# Patient Record
Sex: MALE | Race: WHITE | NOT HISPANIC OR LATINO | Employment: OTHER | URBAN - METROPOLITAN AREA
[De-identification: names, ages, dates, MRNs, and addresses within clinical notes are randomized per-mention and may not be internally consistent; named-entity substitution may affect disease eponyms.]

---

## 2018-06-11 ENCOUNTER — OFFICE VISIT CONVERTED (OUTPATIENT)
Dept: SURGERY | Facility: CLINIC | Age: 73
End: 2018-06-11
Attending: SURGERY

## 2018-06-11 ENCOUNTER — CONVERSION ENCOUNTER (OUTPATIENT)
Dept: SURGERY | Facility: CLINIC | Age: 73
End: 2018-06-11

## 2018-06-29 ENCOUNTER — OFFICE VISIT CONVERTED (OUTPATIENT)
Dept: SURGERY | Facility: CLINIC | Age: 73
End: 2018-06-29
Attending: SURGERY

## 2018-08-10 ENCOUNTER — OFFICE VISIT CONVERTED (OUTPATIENT)
Dept: SURGERY | Facility: CLINIC | Age: 73
End: 2018-08-10
Attending: SURGERY

## 2018-11-01 ENCOUNTER — OFFICE VISIT CONVERTED (OUTPATIENT)
Dept: OTOLARYNGOLOGY | Facility: CLINIC | Age: 73
End: 2018-11-01
Attending: OTOLARYNGOLOGY

## 2019-04-18 ENCOUNTER — HOSPITAL ENCOUNTER (OUTPATIENT)
Dept: NUCLEAR MEDICINE | Facility: HOSPITAL | Age: 74
Discharge: HOME | End: 2019-04-18
Attending: SPECIALIST

## 2019-11-26 ENCOUNTER — HOSPITAL ENCOUNTER (OUTPATIENT)
Dept: SURGERY | Facility: HOSPITAL | Age: 74
Setting detail: HOSPITAL OUTPATIENT SURGERY
Discharge: HOME | End: 2019-11-26
Attending: OPHTHALMOLOGY

## 2019-12-11 ENCOUNTER — HOSPITAL ENCOUNTER (OUTPATIENT)
Dept: SURGERY | Facility: HOSPITAL | Age: 74
Setting detail: HOSPITAL OUTPATIENT SURGERY
Discharge: HOME | End: 2019-12-11
Attending: OPHTHALMOLOGY

## 2019-12-16 ENCOUNTER — HOSPITAL ENCOUNTER (OUTPATIENT)
Dept: OTHER | Facility: HOSPITAL | Age: 74
Discharge: HOME | End: 2019-12-16
Attending: INTERNAL MEDICINE

## 2019-12-16 LAB
25(OH)D3 SERPL-MCNC: 36.3 NG/ML (ref 30–100)
ALBUMIN SERPL-MCNC: 4 G/DL (ref 3.5–5)
ALBUMIN/GLOB SERPL: 1.5 {RATIO} (ref 1.4–2.6)
ALP SERPL-CCNC: 78 U/L (ref 56–155)
ALT SERPL-CCNC: 29 U/L (ref 10–40)
ANION GAP SERPL CALC-SCNC: 16 MMOL/L (ref 8–19)
AST SERPL-CCNC: 23 U/L (ref 15–50)
BASOPHILS # BLD AUTO: 0.05 10*3/UL (ref 0–0.2)
BASOPHILS NFR BLD AUTO: 0.7 % (ref 0–3)
BILIRUB SERPL-MCNC: 0.37 MG/DL (ref 0.2–1.3)
BUN SERPL-MCNC: 18 MG/DL (ref 5–25)
BUN/CREAT SERPL: 20 {RATIO} (ref 6–20)
CALCIUM SERPL-MCNC: 10 MG/DL (ref 8.7–10.4)
CHLORIDE SERPL-SCNC: 102 MMOL/L (ref 99–111)
CONV ABS IMM GRAN: 0.07 10*3/UL (ref 0–0.2)
CONV CO2: 28 MMOL/L (ref 22–32)
CONV CREATININE URINE, RANDOM: 101.5 MG/DL (ref 10–300)
CONV IMMATURE GRAN: 1 % (ref 0–1.8)
CONV MICROALBUM.,U,RANDOM: <12 MG/L (ref 0–20)
CONV TOTAL PROTEIN: 6.6 G/DL (ref 6.3–8.2)
CREAT UR-MCNC: 0.88 MG/DL (ref 0.7–1.2)
DEPRECATED RDW RBC AUTO: 44.8 FL (ref 35.1–43.9)
EOSINOPHIL # BLD AUTO: 0.1 10*3/UL (ref 0–0.7)
EOSINOPHIL # BLD AUTO: 1.5 % (ref 0–7)
ERYTHROCYTE [DISTWIDTH] IN BLOOD BY AUTOMATED COUNT: 13.1 % (ref 11.6–14.4)
EST. AVERAGE GLUCOSE BLD GHB EST-MCNC: 140 MG/DL
GFR SERPLBLD BASED ON 1.73 SQ M-ARVRAT: >60 ML/MIN/{1.73_M2}
GLOBULIN UR ELPH-MCNC: 2.6 G/DL (ref 2–3.5)
GLUCOSE SERPL-MCNC: 155 MG/DL (ref 70–99)
HBA1C MFR BLD: 6.5 % (ref 3.5–5.7)
HCT VFR BLD AUTO: 41.5 % (ref 42–52)
HGB BLD-MCNC: 13.9 G/DL (ref 14–18)
LYMPHOCYTES # BLD AUTO: 1.64 10*3/UL (ref 1–5)
LYMPHOCYTES NFR BLD AUTO: 24.4 % (ref 20–45)
MCH RBC QN AUTO: 31.3 PG (ref 27–31)
MCHC RBC AUTO-ENTMCNC: 33.5 G/DL (ref 33–37)
MCV RBC AUTO: 93.5 FL (ref 80–96)
MICROALBUMIN/CREAT UR: 11.8 MG/G{CRE} (ref 0–25)
MONOCYTES # BLD AUTO: 0.44 10*3/UL (ref 0.2–1.2)
MONOCYTES NFR BLD AUTO: 6.5 % (ref 3–10)
NEUTROPHILS # BLD AUTO: 4.42 10*3/UL (ref 2–8)
NEUTROPHILS NFR BLD AUTO: 65.9 % (ref 30–85)
NRBC CBCN: 0 % (ref 0–0.7)
OSMOLALITY SERPL CALC.SUM OF ELEC: 299 MOSM/KG (ref 273–304)
PLATELET # BLD AUTO: 257 10*3/UL (ref 130–400)
PMV BLD AUTO: 11.2 FL (ref 9.4–12.4)
POTASSIUM SERPL-SCNC: 3.9 MMOL/L (ref 3.5–5.3)
RBC # BLD AUTO: 4.44 10*6/UL (ref 4.7–6.1)
SODIUM SERPL-SCNC: 142 MMOL/L (ref 135–147)
TSH SERPL-ACNC: 1.25 M[IU]/L (ref 0.27–4.2)
WBC # BLD AUTO: 6.72 10*3/UL (ref 4.8–10.8)

## 2019-12-17 ENCOUNTER — HOSPITAL ENCOUNTER (OUTPATIENT)
Dept: OTHER | Facility: HOSPITAL | Age: 74
Discharge: HOME | End: 2019-12-17
Attending: INTERNAL MEDICINE

## 2019-12-17 LAB
APTT BLD: 22.2 S (ref 22.2–34.2)
CHOLEST SERPL-MCNC: 104 MG/DL (ref 107–200)
CHOLEST/HDLC SERPL: 3 {RATIO} (ref 3–6)
HDLC SERPL-MCNC: 35 MG/DL (ref 40–60)
INR PPP: 0.98 (ref 2–3)
LDLC SERPL CALC-MCNC: 56 MG/DL (ref 70–100)
PROTHROMBIN TIME: 10.6 S (ref 9.4–12)
TRIGL SERPL-MCNC: 66 MG/DL (ref 40–150)
VLDLC SERPL-MCNC: 13 MG/DL (ref 5–37)

## 2020-06-05 ENCOUNTER — OFFICE VISIT CONVERTED (OUTPATIENT)
Dept: UROLOGY | Facility: CLINIC | Age: 75
End: 2020-06-05
Attending: UROLOGY

## 2020-07-09 ENCOUNTER — HOSPITAL ENCOUNTER (OUTPATIENT)
Dept: OTHER | Facility: HOSPITAL | Age: 75
Discharge: HOME | End: 2020-07-09
Attending: OPHTHALMOLOGY

## 2020-07-09 LAB
ANION GAP SERPL CALC-SCNC: 13 MMOL/L (ref 8–19)
BASOPHILS # BLD AUTO: 0.05 10*3/UL (ref 0–0.2)
BASOPHILS NFR BLD AUTO: 0.6 % (ref 0–3)
BUN SERPL-MCNC: 20 MG/DL (ref 5–25)
BUN/CREAT SERPL: 20 {RATIO} (ref 6–20)
CALCIUM SERPL-MCNC: 10.3 MG/DL (ref 8.7–10.4)
CHLORIDE SERPL-SCNC: 100 MMOL/L (ref 99–111)
CONV ABS IMM GRAN: 0.05 10*3/UL (ref 0–0.2)
CONV CO2: 25 MMOL/L (ref 22–32)
CONV IMMATURE GRAN: 0.6 % (ref 0–1.8)
CREAT UR-MCNC: 0.99 MG/DL (ref 0.7–1.2)
DEPRECATED RDW RBC AUTO: 43.9 FL (ref 35.1–43.9)
EOSINOPHIL # BLD AUTO: 0.16 10*3/UL (ref 0–0.7)
EOSINOPHIL # BLD AUTO: 1.8 % (ref 0–7)
ERYTHROCYTE [DISTWIDTH] IN BLOOD BY AUTOMATED COUNT: 12.6 % (ref 11.6–14.4)
GFR SERPLBLD BASED ON 1.73 SQ M-ARVRAT: >60 ML/MIN/{1.73_M2}
GLUCOSE SERPL-MCNC: 111 MG/DL (ref 70–99)
HCT VFR BLD AUTO: 43.6 % (ref 42–52)
HGB BLD-MCNC: 14.6 G/DL (ref 14–18)
LYMPHOCYTES # BLD AUTO: 2.49 10*3/UL (ref 1–5)
LYMPHOCYTES NFR BLD AUTO: 27.6 % (ref 20–45)
MCH RBC QN AUTO: 31.7 PG (ref 27–31)
MCHC RBC AUTO-ENTMCNC: 33.5 G/DL (ref 33–37)
MCV RBC AUTO: 94.8 FL (ref 80–96)
MONOCYTES # BLD AUTO: 0.8 10*3/UL (ref 0.2–1.2)
MONOCYTES NFR BLD AUTO: 8.9 % (ref 3–10)
NEUTROPHILS # BLD AUTO: 5.46 10*3/UL (ref 2–8)
NEUTROPHILS NFR BLD AUTO: 60.5 % (ref 30–85)
NRBC CBCN: 0 % (ref 0–0.7)
OSMOLALITY SERPL CALC.SUM OF ELEC: 281 MOSM/KG (ref 273–304)
PLATELET # BLD AUTO: 256 10*3/UL (ref 130–400)
PMV BLD AUTO: 10.9 FL (ref 9.4–12.4)
POTASSIUM SERPL-SCNC: 4 MMOL/L (ref 3.5–5.3)
RBC # BLD AUTO: 4.6 10*6/UL (ref 4.7–6.1)
SODIUM SERPL-SCNC: 134 MMOL/L (ref 135–147)
WBC # BLD AUTO: 9.01 10*3/UL (ref 4.8–10.8)

## 2020-07-10 ENCOUNTER — OFFICE VISIT CONVERTED (OUTPATIENT)
Dept: SURGERY | Facility: CLINIC | Age: 75
End: 2020-07-10
Attending: SURGERY

## 2020-07-10 LAB — SARS-COV-2 RNA SPEC QL NAA+PROBE: NOT DETECTED

## 2020-07-13 ENCOUNTER — HOSPITAL ENCOUNTER (OUTPATIENT)
Dept: PERIOP | Facility: HOSPITAL | Age: 75
Setting detail: HOSPITAL OUTPATIENT SURGERY
Discharge: HOME | End: 2020-07-13
Attending: OPHTHALMOLOGY

## 2020-07-23 ENCOUNTER — OFFICE VISIT CONVERTED (OUTPATIENT)
Dept: ORTHOPEDIC SURGERY | Facility: CLINIC | Age: 75
End: 2020-07-23
Attending: ORTHOPAEDIC SURGERY

## 2020-09-07 ENCOUNTER — HOSPITAL ENCOUNTER (OUTPATIENT)
Dept: PREADMISSION TESTING | Facility: HOSPITAL | Age: 75
Discharge: HOME | End: 2020-09-07
Attending: SURGERY

## 2020-09-08 LAB — SARS-COV-2 RNA SPEC QL NAA+PROBE: NOT DETECTED

## 2020-09-09 ENCOUNTER — HOSPITAL ENCOUNTER (OUTPATIENT)
Dept: GASTROENTEROLOGY | Facility: HOSPITAL | Age: 75
Setting detail: HOSPITAL OUTPATIENT SURGERY
Discharge: HOME | End: 2020-09-09
Attending: SURGERY

## 2021-03-05 ENCOUNTER — OFFICE VISIT CONVERTED (OUTPATIENT)
Dept: OTOLARYNGOLOGY | Facility: CLINIC | Age: 76
End: 2021-03-05
Attending: OTOLARYNGOLOGY

## 2021-03-05 ENCOUNTER — CONVERSION ENCOUNTER (OUTPATIENT)
Dept: OTOLARYNGOLOGY | Facility: CLINIC | Age: 76
End: 2021-03-05

## 2021-05-07 ENCOUNTER — HOSPITAL ENCOUNTER (OUTPATIENT)
Dept: GENERAL RADIOLOGY | Facility: HOSPITAL | Age: 76
Discharge: HOME | End: 2021-05-07
Attending: PSYCHIATRY & NEUROLOGY

## 2021-05-10 LAB — CONV CALCIUM IONIZED: 6.1 MG/DL (ref 4.5–5.6)

## 2021-05-10 NOTE — H&P
"   History and Physical      Patient Name: Jaxon Aggarwal   Patient ID: [id number]   Sex: Male   YOB: 1945    Primary Care Provider: Max Castillo MD   Referring Provider: Max Castillo MD    Visit Date: July 23, 2020    Provider: Cruzito Adan MD   Location: [facility]   Location Address: [address] [place] [address]  [id number]   Location Phone: [phone]          Chief Complaint  · bilateral hip pain      History Of Present Illness  Jaxon Aggarwal is a 74 year old /White male who presents today to [facility].      left.  Patient has been having hip pain for a few years and has worsened over the last year, increasing pain with activity. The more pain he has he localizes laterally in his hip.  He denies groin pain. No radicular pain.   \">The patient presents today for evaluation of bilateral hips, right > left.  Patient has been having hip pain for a few years and has worsened over the last year, increasing pain with activity. The more pain he has he localizes laterally in his hip.  He denies groin pain. No radicular pain.          Past Medical History  Allergic rhinitis, chronic; Arthritis; Arthritis; BPH; GERD; High blood pressure; Hypertension; Hypogonadism; Mood disorder; Prostate Disorder; Seasonal allergies; Vertigo         Past Surgical History  Cataract surgery; Cystoscopy; Eye Implant; Hernia; Tonsillectomy         Medication List  finasteride 5 mg oral tablet; Flomax 0.4 mg oral capsule; lisinopril-hydrochlorothiazide 20-25 mg oral tablet; meclizine 25 mg oral tablet; Prozac 40 mg oral capsule; triamcinolone acetonide 0.1 % topical cream; Voltaren 1 % topical gel         Allergy List  NO KNOWN DRUG ALLERGIES       Allergies Reconciled  Family Medical History  Stroke; Cancer, Unspecified; Bladder calculus; Diabetes; Osteoporosis         Social History  Alcohol (Current some day); Alcohol Use (Current some day); Caffeine (Current every day); lives with spouse; " ".; Recreational Drug Use (Never); Retired.; Second hand smoke exposure (Never); Tobacco (Former)         Review of Systems  · Constitutional  o Denies  o : fever, chills, weight loss  · Cardiovascular  o Denies  o : chest pain, shortness of breath  · Gastrointestinal  o Denies  o : liver disease, heartburn, nausea, blood in stools  · Genitourinary  o Denies  o : painful urination, blood in urine  · Integument  o Denies  o : rash, itching  · Neurologic  o Denies  o : headache, weakness, loss of consciousness  · Musculoskeletal  o Denies  o : painful, swollen joints  · Psychiatric  o Denies  o : drug/alcohol addiction, anxiety, depression      Vitals  Date Time BP Position Site L\R Cuff Size HR RR TEMP (F) WT  HT  BMI kg/m2 BSA m2 O2 Sat        07/23/2020 01:23 PM      94 - R   165lbs 0oz 5'  7\" 25.84 1.88 97 %          Physical Examination  · Constitutional  o Appearance  o : well developed, well-nourished, no obvious deformities present  · Head and Face  o Head  o :   § Inspection  § : normocephalic  o Face  o :   § Inspection  § : no facial lesions  · Eyes  o Conjunctivae  o : conjunctivae normal  o Sclerae  o : sclerae white  · Ears, Nose, Mouth and Throat  o Ears  o :   § External Ears  § : appearance within normal limits  § Hearing  § : intact  o Nose  o :   § External Nose  § : appearance normal  · Neck  o Inspection/Palpation  o : normal appearance  o Range of Motion  o : full range of motion  · Respiratory  o Respiratory Effort  o : breathing unlabored  o Inspection of Chest  o : normal appearance  o Auscultation of Lungs  o : no audible wheezing or rales  · Cardiovascular  o Heart  o : regular rate  · Gastrointestinal  o Abdominal Examination  o : soft and non-tender  · Skin and Subcutaneous Tissue  o General Inspection  o : intact, no rashes  · Psychiatric  o General  o : Alert and oriented x3  o Judgement and Insight  o : judgment and insight intact  o Mood and Affect  o : mood normal, affect " appropriate  · Extremities  o Extremities  o : sensation intact, pulses normal, tender to palpation, pain with movement, tender to palpation over trochanteric bursa  · Injection Note/Aspiration Note  o Site  o : IM  o Procedure  o : Procedure: After educating the patient, patient gave consent for the procedure. After using alcohol prep, injection was given. The patient tolerated the procedure well.   o Medication  o : 2ml's of 4 mg Dexamethasone   · In Office Procedures  o View  o : AP/LATERAL  o Site  o : right, hip   o Indication  o : Right hip pain   o Study  o : X-rays ordered, taken in the office, and reviewed today.  o Xray  o : negative fracture or dislocation   o Comparative Data  o : No comparative data found          Assessment  · Trochanteric Bursitis, right > left     726.5/M70.60  · Right Pain: Hip     719.45/M25.559      Plan  · Orders  o 2.00 - Dexamethasone Injection 8mg (-1) - 719.45/M25.559 - 07/23/2020   Lot [id number] manufactured by [employer] 06 2021  o IM/SQ - Injection Fee [facility] (36703) - 719.45/M25.559 - 07/23/2020   Administered by SHANTEL Polo  o Hip (Right) 2 or more views (includes AP Pelvis) [facility] Preferred View. 29742) - 719.45/M25.559 - 07/23/2020  · Medications  o Medications have been Reconciled  o Transition of Care or Provider Policy  · Instructions  o Reviewed the patient's Past Medical, Social, and Family history as well as the ROS at today's visit, no changes.  o Call or return if worsening symptoms.  o Exercise handout given.  o X-ray ordered, taken and reviewed at this visit.  o Follow up as needed.  o This note was transcribed by Leticia Light.   o Plan for injection, Voltaren gel and stretches.   o Electronically Identified Patient Education Materials Provided Electronically            Electronically Signed by: Leticia Light-, Other -Author on July 24, 2020 11:07:18 PM  Electronically Co-signed by: Cruzito Adan MD -Reviewer on July 31, 2020 08:54:34 AM

## 2021-05-10 NOTE — H&P
History and Physical      Patient Name: Jaxon Aggarwal   Patient ID: [id number]   Sex: Male   YOB: 1945    Primary Care Provider: Max Castillo MD   Referring Provider: Max Castillo MD    Visit Date: June 5, 2020    Provider: Donald Martinez MD   Location[facility]   Location Address: [address] [place] [address]  [id number]   Location Phone: [phone]          Chief Complaint  · pt here for urologic issues      History Of Present Illness       74-year-old gentleman with BPH and family history of prostate cancer    slow stream.  Some trouble with initiation of stream. Nocturia X 2-3   .  No urgency or frequency.  No incontinence.  Flomax 0.4 mg. Not sure if this is helping    PVR today 007    no gross hematuria, dysuria or recurrent urinary tract infections.  No history of kidney stone.    Prostate cancer diagnosed around 61.    Has never had any urologic surgery.    No cardiopulmonary history.  Patient does not smoke.  Patient does not use blood thinner.  Men in his family do not live to be old, mom lived to .    PSA, being done at his primary care physician Dr. Castillo    4/19 1.0       Past Medical History  Allergic rhinitis, chronic; Arthritis; Arthritis; BPH; GERD; High blood pressure; Hypogonadism; Mood disorder; Seasonal allergies; Vertigo         Past Surgical History  Cataract surgery; Cystoscopy; Hernia; Tonsillectomy         Medication List  Flomax 0.4 mg oral capsule,extended release 24hr; lisinopril-hydrochlorothiazide 20-25 mg oral tablet; Prozac 40 mg oral capsule; triamcinolone acetonide 0.1 % topical cream         Allergy List  NO KNOWN DRUG ALLERGIES         Family Medical History  Stroke; Cancer, Unspecified; Bladder calculus; Diabetes         Social History  Alcohol (Current some day); Caffeine (Current every day); Second hand smoke exposure (Never); Tobacco (Former)         Review of Systems  · Constitutional  o Denies  o :  "chills  · Gastrointestinal  o Denies  o : nausea      Vitals  Date Time BP Position Site L\R Cuff Size HR RR TEMP (F) WT  HT  BMI kg/m2 BSA m2 O2 Sat        06/05/2020 01:49 PM       15  170lbs 0oz 5'  7\" 26.63 1.91           Physical Examination  · Constitutional  o Appearance  o : Well-appearing, well-developed, in no acute distress  · Respiratory  o Respiratory Effort  o : Unlabored breathing  o Auscultation of Lungs  o : Unlabored breathing, clear to auscultation bilaterally  · Cardiovascular  o Heart  o :   § Auscultation of Heart  § : Regular rate and rhythm, no murmurs  · Gastrointestinal  o Abdominal Examination  o : Nontender, nondistended, no rigidity or guarding, no hepatosplenomegaly  · Genitourinary  o Bladder  o : nonpalpable  o Penis  o : circumcised phallus with no masses or lesions  o Urethral Meatus  o : no inflammation present and no stenosis  o Scrotum and Scrotal Contents  o :   § Testes  § : Bilateral descended testicles no masses or lesions  o Digital Rectal Examination  o :   § Tone and Masses  § : Normal sphincter tone, no rectal masses present  § Prostate  § : prostate nontender with no nodules and normal consistency, 45 g  · Neurologic  o Mental Status Examination  o :   § Orientation  § : Grossly oriented to person, place and time, judgment and insight intact, normal mood and affect          Results  · In-Office Procedures  o Lab procedure  § Automated Dipstick Urinalysis (Surg Spec) WITHOUT Micro [facility] (74263)   § Color Ur: Yellow   § Clarity Ur: Clear   § Glucose Ur Ql Strip: Negative   § Bilirub Ur Ql Strip: Negative   § Ketones Ur Ql Strip: Trace   § Sp Gr Ur Qn: 1.025   § Hgb Ur Ql Strip: Negative   § pH Ur-LsCnc: 5.5   § Prot Ur Ql Strip: Negative   § Urobilinogen Ur Strip-mCnc: 0.2 E.U./dL   § Nitrite Ur Ql Strip: Negative   § WBC Est Ur Ql Strip: Negative   o Surgical procedure  § IOP - Bladder Scan/Residual Urine (07335)   § Specimen vol Ur: 7       Assessment  · BPH (benign " prostatic hyperplasia)     600.00/N40.0  · Family history of prostate cancer     V16.42/Z80.42    Problems Reconciled  Plan  · Medications  o Medications have been Reconciled  o Transition of Care or Provider Policy  · Instructions  o Electronically Identified Patient Education Materials Provided Electronically       PSA Records were acquired from his PCP    Patient bothered by a slow stream.  After discussion he will stay on Flomax 0.4 mg daily and also start finasteride 5 mg daily.  Risks benefits side effect discussed today    Follow-up in 6 months with [facility]    Records reviewed today and summarized in chart    Greater than 20 minutes was used in counseling and coordination of care, with greater than 51% of this in face-to-face counseling                     Electronically Signed by: Donald Martinez MD -Author on June 6, 2020 08:36:32 AM

## 2021-05-10 NOTE — H&P
History and Physical      Patient Name: Jaxon Aggarwal   Patient ID: [id number]   Sex: Male   YOB: 1945    Primary Care Provider: Max Castillo MD   Referring Provider: Max Castillo MD    Visit Date: July 10, 2020    Provider: Moi Mendoza MD   Location: [facility]   Location Address: [address] [place] [address]  [id number]   Location Phone: [phone]          Chief Complaint  · Outpatient History & Physical / Surgical Orders  · GERD  · Colon Consult      History Of Present Illness  Jaxon Aggarwal is a 74 year old /White male who presents to the office today as a consult from [facility].      Patient is a self-referral to see me for generalized abdominal pain.  No diarrhea.  No nausea or vomiting.  Patient is just having pain at various different places of his abdomen and he is worried he might have cancer.  No unintended weight loss.  Last colonoscopy was over 10 years ago.  Patient uses Tums but only on a as needed basis.  No change in normal bowel function       Past Medical History  Disease Name Date Onset Notes   Allergic rhinitis, chronic --  --    Arthritis --  --    Arthritis --  --    BPH --  --    GERD --  --    High blood pressure --  --    Hypogonadism --  --    Mood disorder --  --    Seasonal allergies --  --    Vertigo --  --          Past Surgical History  Procedure Name Date Notes   Cataract surgery --  --    Cystoscopy --  --    Hernia --  --    Tonsillectomy --  --          Medication List  Name Date Started Instructions   finasteride 5 mg oral tablet 06/09/2020 take 1 tablet (5 mg) by oral route once daily for 90 days   Flomax 0.4 mg oral capsule 06/09/2020 take 1 capsule (0.4 mg) by oral route once daily 1/2 hour following the same meal each day for 90 days   lisinopril-hydrochlorothiazide 20-25 mg oral tablet  take 1 tablet by oral route once daily   meclizine 25 mg oral tablet  take 1 tablet (25 mg) by oral route once daily   Prozac 40  "mg oral capsule  take 1 capsule (40 mg) by oral route once daily in the evening   triamcinolone acetonide 0.1 % topical cream  apply a thin layer to the affected area(s) by topical route 2 times per day         Allergy List  Allergen Name Date Reaction Notes   NO KNOWN DRUG ALLERGIES --  --  --        Allergies Reconciled  Family Medical History  Disease Name Relative/Age Notes   Stroke Mother/   --    Cancer, Unspecified Aunt/  Brother/  Father/   --    Bladder calculus Mother/   Mother   Diabetes Uncle/   --          Social History  Finding Status Start/Stop Quantity Notes   Alcohol Current some day --/-- --  07/10/2020 - drinks monthly; beer   Caffeine Current every day --/-- --  drinks regularly; coffee; 3-4 times per day   Second hand smoke exposure Never --/-- --  no   Tobacco Former 13/50 --  former smoker; started smoking at age 13; quit smoking at age 51; smoked 10 cigarette(s) per day         Review of Systems  · Constitutional  o Denies  o : chills, fever  · Gastrointestinal  o Denies  o : nausea, vomiting      Vitals  Date Time BP Position Site L\R Cuff Size HR RR TEMP (F) WT  HT  BMI kg/m2 BSA m2 O2 Sat HC       07/10/2020 10:28 AM         168lbs 0oz 5'  7\" 26.31 1.9           Physical Examination  · Constitutional  o Appearance  o : healthy appearing, alert and in no acute distress  · Head and Face  o Head  o :   § Inspection  § : no visable deformities or lesions  · Eyes  o Conjunctivae  o : clear  o Sclerae  o : clear  · Neck  o Inspection/Palpation  o : normal appearance, no masses, trachea midline  · Respiratory  o Respiratory Effort  o : breathing unlabored, respiratory effort appears normal  o Inspection of Chest  o : normal appearance, no retractions  · Cardiovascular  o Heart  o : regular rate and rhythm  · Gastrointestinal  o Abdominal Examination  o :   § Abdomen  § : soft, nondistended, nondistended  · Skin and Subcutaneous Tissue  o General Inspection  o : no visible concerning rashes or " lesions present  · Neurologic  o Cranial Nerves  o : no obvious motor deficits  o Sensation  o : no obvious sensory deficits  o Gait and Station  o :   § Gait Screening  § : normal gait, able to stand without diffculty  o Cerebellar Function  o : no obvious abnormalities  · Psychiatric  o Judgement and Insight  o : judgment and insight intact  o Mood and Affect  o : mood normal, affect appropriate          Assessment  · Pre-Surgical Orders     V72.84  · Screening for colon cancer     V76.51/Z12.11  · Abdominal Pain, Generalized     789.07/R10.84  · Preop testing     V72.84/Z01.818    Problems Reconciled  Plan  · Orders  o Colonoscopy (42087) - V72.84 - 09/09/2020  o Endoscopy (12718) - V72.84 - 09/09/2020  o [facility] Pre-Op Covid-19 Screening (73261) - - 09/07/2020   at 11:45am  · Medications  o Medications have been Reconciled  o Transition of Care or Provider Policy  · Instructions  o PLAN: Colonoscopy  o PLAN: Colonoscopy and Esophagogastroduodenoscopy  o Outpatient  o The indications, options, risks, benefits, and expected outcomes of the planned procedure were discussed with the patient and the patient agrees to proceed.   o IV: LR@ 30 ml/hr  o Anesthesia: MAC  o PLEASE SIGN PERMIT FOR: Colonscopy with possible biopsy and possible polypectomy  o PLEASE SIGN PERMIT FOR: Esophagogastroduodenoscopy with possible biopsy  o Electronically Identified Patient Education Materials Provided Electronically            Electronically Signed by: Moi Mendoza MD -Author on July 10, 2020 10:48:37 AM

## 2021-05-14 VITALS — HEIGHT: 67 IN | TEMPERATURE: 98 F | WEIGHT: 179.25 LBS | BODY MASS INDEX: 28.13 KG/M2

## 2021-05-14 NOTE — PROGRESS NOTES
Progress Note      Patient Name: Jaxon Aggarwal   Patient ID: [id number]   Sex: Male   YOB: 1945    Primary Care Provider: Max Castillo MD   Referring Provider: Max Castillo MD    Visit Date: March 5, 2021    Provider: Andrew Peraza MD   Location: [facility]   Location Address: [address] [place] [address], [address]  [id number]   Location Phone: [phone]          Chief Complaint     1.  Allergic rhinitis    2.  Hearing loss    3.  Ear abnormality noted by audiologist       History Of Present Illness  Jaxon Aggarwal is a 75 year old /White male who presents to the office today for a follow-up visit.      He presents the clinic today for reevaluation regarding allergic rhinitis, nasal congestion, and ear abnormality noted by our audiologist.  He does have a longstanding hearing loss, and is due to have his hearing aids reprogrammed.  On exam, the audiologist saw something abnormal in his ear and sent him for further evaluation.  The patient notes no ear pain or drainage.  He notes that his hearing has been stable, and he is getting good benefit from his hearing aids.    He does complain about clear nasal drainage and allergies.  In the last clinic visit in 2018 I recommended nasal saline irrigations, but he has not been using these.  He denies any epistaxis, purulent drainage, or facial pain or pressure.       Past Medical History  Allergic rhinitis, chronic; Arthritis; BPH; GERD; Hearing loss; High blood pressure; Hypertension; Hypogonadism; Imbalance; Mood disorder; Otitis Media; Prostate Disorder; Seasonal allergies; Vertigo         Past Surgical History  Cataract surgery; Cystoscopy; Eye Implant; Hernia; Tonsillectomy         Medication List  finasteride 5 mg oral tablet; Flomax 0.4 mg oral capsule; lisinopril-hydrochlorothiazide 20-25 mg oral tablet; meclizine 25 mg oral tablet; Prozac 40 mg oral capsule; triamcinolone acetonide 0.1 % topical cream;  "Voltaren 1 % topical gel         Allergy List  NO KNOWN DRUG ALLERGIES         Family Medical History  Family history of cancer; Family history of stroke; Family history of heart disease; Family history of diabetes mellitus         Social History  Alcohol (Current some day); Alcohol Use (Current some day); Caffeine (Current every day); lives with spouse; .; Recreational Drug Use (Never); Retired.; Second hand smoke exposure (Never); Tobacco (Former)         Review of Systems  · Constitutional  o Denies  o : fever, night sweats, weight loss  · Eyes  o Denies  o : discharge from eye, impaired vision  · HENT  o Admits  o : *See HPI  · Cardiovascular  o Denies  o : chest pain, irregular heart beats  · Respiratory  o Denies  o : shortness of breath, wheezing, coughing up blood  · Gastrointestinal  o Denies  o : heartburn, reflux, vomiting blood  · Genitourinary  o Denies  o : frequency  · Integument  o Denies  o : rash, skin dryness  · Neurologic  o Admits  o : loss of balance  o Denies  o : seizures, loss of consciousness, dizziness  · Endocrine  o Denies  o : cold intolerance, heat intolerance  · Heme-Lymph  o Denies  o : easy bleeding, anemia      Vitals  Date Time BP Position Site L\R Cuff Size HR RR TEMP (F) WT  HT  BMI kg/m2 BSA m2 O2 Sat FR L/min FiO2 HC       03/05/2021 10:02 AM        98 179lbs 4oz 5'  7\" 28.07 1.96             Physical Examination  · Constitutional  o Appearance  o : well developed, well-nourished, alert and in no acute distress, voice clear and strong  · Head and Face  o Head  o :   § Inspection  § : no deformities or lesions  o Face  o :   § Inspection  § : No facial lesions; House-Brackmann I/VI bilaterally  § Palpation  § : No TMJ crepitus nor  muscle tenderness bilaterally  · Eyes  o Vision  o :   § Visual Fields  § : Extraocular movements are intact. No spontaneous or gaze-induced nystagmus.  o Conjunctivae  o : clear  o Sclerae  o : clear  o Pupils and Irises  o : pupils " equal, round, and reactive to light.   · Ears, Nose, Mouth and Throat  o Ears  o :   § External Ears  § : appearance within normal limits, no lesions present  § Otoscopic Examination  § : Foreign body in the right hearing aid fitting, removed with alligator forceps and microscope, tympanic membrane appearance within normal limits bilaterally without perforations, well-aerated middle ears  § Hearing  § : intact to conversational voice both ears  o Nose  o :   § External Nose  § : appearance normal  § Intranasal Exam  § : mucosa mildly congested, vestibules normal, no intranasal lesions present, septum midline, sinuses non tender to percussion  o Oral Cavity  o :   § Oral Mucosa  § : oral mucosa normal without pallor or cyanosis  § Lips  § : lip appearance normal  § Teeth  § : normal dentition for age  § Gums  § : gums pink, non-swollen, no bleeding present  § Tongue  § : tongue appearance normal; normal mobility  § Palate  § : hard palate normal, soft palate appearance normal with symmetric mobility  o Throat  o :   § Oropharynx  § : no inflammation or lesions present, tonsils within normal limits  § Hypopharynx  § : appearance within normal limits, superior epiglottis within normal limits  § Larynx  § : appearance within normal limits, vocal cords within normal limits, no lesions present  · Neck  o Inspection/Palpation  o : normal appearance, no masses or tenderness, trachea midline; thyroid size normal, nontender, no nodules or masses present on palpation  · Respiratory  o Respiratory Effort  o : breathing unlabored  o Inspection of Chest  o : normal appearance, no retractions  · Cardiovascular  o Heart  o : regular rate and rhythm  · Lymphatic  o Neck  o : no lymphadenopathy present  o Supraclavicular Nodes  o : no lymphadenopathy present  o Preauricular Nodes  o : no lymphadenopathy present  · Skin and Subcutaneous Tissue  o General Inspection  o : Regarding face and neck - there are no rashes present, no lesions  present, and no areas of discoloration  · Neurologic  o Cranial Nerves  o : cranial nerves II-XII are grossly intact bilaterally  o Gait and Station  o : normal gait, able to stand without diffculty  · Psychiatric  o Judgement and Insight  o : judgment and insight intact  o Mood and Affect  o : mood normal, affect appropriate          Assessment  · Allergic rhinitis     477.9/J30.9  · Foreign body in ear     931/T16.9XXA      Plan  · Orders  o Foreign body removal - Ear [facility] (38300) - 931/T16.9XXA - 03/05/2021  · Medications  o Medications have been Reconciled  o Transition of Care or Provider Policy  · Instructions  o He presents the clinic today for reevaluation regarding allergic rhinitis, nasal congestion, and ear abnormality noted by our audiologist. He does have a longstanding hearing loss, and is due to have his hearing aids reprogrammed. On exam, the audiologist saw something abnormal in his ear and sent him for further evaluation. The patient notes no ear pain or drainage. He notes that his hearing has been stable, and he is getting good benefit from his hearing aids. Exam revealed a piece of hearing aid fitting in the right ear canal which is able to remove with alligator forceps. His eardrums do have some changes from previous infections, but are well aerated and are free of any fluid or infection.He does complain about clear nasal drainage and allergies. In the last clinic visit in 2018 I recommended nasal saline irrigations, but he has not been using these. He denies any epistaxis, purulent drainage, or facial pain or pressure. On exam he has some clear nasal drainage and congestion, and I recommended using nasal saline irrigations for this.  o Electronically Identified Patient Education Materials Provided Electronically  · Correspondence  o ENT Letter to Referring MD (Max Castillo MD) - 03/05/2021            Electronically Signed by: Andrew Peraza MD -Author on March 5, 2021 10:22:33 AM

## 2021-05-15 VITALS — BODY MASS INDEX: 25.9 KG/M2 | HEART RATE: 94 BPM | WEIGHT: 165 LBS | HEIGHT: 67 IN | OXYGEN SATURATION: 97 %

## 2021-05-15 VITALS — HEIGHT: 67 IN | WEIGHT: 168 LBS | BODY MASS INDEX: 26.37 KG/M2

## 2021-05-15 VITALS — HEIGHT: 67 IN | WEIGHT: 170 LBS | BODY MASS INDEX: 26.68 KG/M2 | RESPIRATION RATE: 15 BRPM

## 2021-05-16 VITALS — WEIGHT: 165.12 LBS | HEIGHT: 67 IN | RESPIRATION RATE: 16 BRPM | BODY MASS INDEX: 25.92 KG/M2

## 2021-05-16 VITALS
DIASTOLIC BLOOD PRESSURE: 60 MMHG | HEART RATE: 90 BPM | TEMPERATURE: 98.4 F | OXYGEN SATURATION: 95 % | HEIGHT: 67 IN | WEIGHT: 166 LBS | SYSTOLIC BLOOD PRESSURE: 158 MMHG | RESPIRATION RATE: 18 BRPM | BODY MASS INDEX: 26.06 KG/M2

## 2021-05-16 VITALS — BODY MASS INDEX: 25.9 KG/M2 | HEIGHT: 67 IN | RESPIRATION RATE: 14 BRPM | WEIGHT: 165 LBS

## 2021-05-21 ENCOUNTER — HOSPITAL ENCOUNTER (OUTPATIENT)
Dept: NUCLEAR MEDICINE | Facility: HOSPITAL | Age: 76
Discharge: HOME | End: 2021-05-21
Attending: INTERNAL MEDICINE

## 2021-05-21 LAB — CONV CREATININE URINE, RANDOM: 175.3 MG/DL (ref 10–300)

## 2021-05-22 ENCOUNTER — TRANSCRIBE ORDERS (OUTPATIENT)
Dept: ADMINISTRATIVE | Facility: HOSPITAL | Age: 76
End: 2021-05-22

## 2021-05-22 DIAGNOSIS — M81.0 AGE RELATED OSTEOPOROSIS, UNSPECIFIED PATHOLOGICAL FRACTURE PRESENCE: Primary | ICD-10-CM

## 2021-05-22 LAB — CALCIUM 24H UR-MCNC: 44.4 MG/DL

## 2021-05-23 ENCOUNTER — TRANSCRIBE ORDERS (OUTPATIENT)
Dept: ADMINISTRATIVE | Facility: HOSPITAL | Age: 76
End: 2021-05-23

## 2021-05-23 DIAGNOSIS — Z87.891 FORMER SMOKER: Primary | ICD-10-CM

## 2021-06-15 ENCOUNTER — TRANSCRIBE ORDERS (OUTPATIENT)
Dept: PHYSICAL THERAPY | Facility: CLINIC | Age: 76
End: 2021-06-15

## 2021-06-15 ENCOUNTER — OFFICE VISIT (OUTPATIENT)
Dept: ORTHOPEDIC SURGERY | Facility: CLINIC | Age: 76
End: 2021-06-15

## 2021-06-15 VITALS — HEIGHT: 67 IN | OXYGEN SATURATION: 96 % | WEIGHT: 170 LBS | HEART RATE: 73 BPM | BODY MASS INDEX: 26.68 KG/M2

## 2021-06-15 DIAGNOSIS — M70.62 TROCHANTERIC BURSITIS OF BOTH HIPS: Primary | ICD-10-CM

## 2021-06-15 DIAGNOSIS — M54.50 ACUTE LOW BACK PAIN WITHOUT SCIATICA, UNSPECIFIED BACK PAIN LATERALITY: ICD-10-CM

## 2021-06-15 DIAGNOSIS — M70.61 TROCHANTERIC BURSITIS OF BOTH HIPS: Primary | ICD-10-CM

## 2021-06-15 DIAGNOSIS — M54.50 LOW BACK PAIN WITHOUT SCIATICA, UNSPECIFIED BACK PAIN LATERALITY, UNSPECIFIED CHRONICITY: ICD-10-CM

## 2021-06-15 PROCEDURE — 96372 THER/PROPH/DIAG INJ SC/IM: CPT | Performed by: ORTHOPAEDIC SURGERY

## 2021-06-15 PROCEDURE — 99213 OFFICE O/P EST LOW 20 MIN: CPT | Performed by: ORTHOPAEDIC SURGERY

## 2021-06-15 RX ORDER — TRIAZOLAM 0.12 MG/1
0.12 TABLET ORAL
COMMUNITY
End: 2022-05-03

## 2021-06-15 RX ORDER — FLUOXETINE HYDROCHLORIDE 40 MG/1
40 CAPSULE ORAL EVERY MORNING
Status: ON HOLD | COMMUNITY
Start: 2021-05-28 | End: 2022-08-17

## 2021-06-15 RX ORDER — MELATONIN
1000 DAILY
COMMUNITY

## 2021-06-15 RX ORDER — TAMSULOSIN HYDROCHLORIDE 0.4 MG/1
CAPSULE ORAL
COMMUNITY
Start: 2021-05-28 | End: 2022-05-03 | Stop reason: SDUPTHER

## 2021-06-15 RX ORDER — FINASTERIDE 5 MG/1
TABLET, FILM COATED ORAL
COMMUNITY
Start: 2021-03-09 | End: 2022-05-03 | Stop reason: SDUPTHER

## 2021-06-15 RX ORDER — DEXAMETHASONE SODIUM PHOSPHATE 4 MG/ML
8 INJECTION, SOLUTION INTRA-ARTICULAR; INTRALESIONAL; INTRAMUSCULAR; INTRAVENOUS; SOFT TISSUE ONCE
Status: COMPLETED | OUTPATIENT
Start: 2021-06-15 | End: 2021-06-15

## 2021-06-15 RX ORDER — MECLIZINE HYDROCHLORIDE 25 MG/1
TABLET ORAL
Status: ON HOLD | COMMUNITY
Start: 2021-03-24 | End: 2022-08-17

## 2021-06-15 RX ORDER — DONEPEZIL HYDROCHLORIDE 10 MG/1
10 TABLET, FILM COATED ORAL DAILY
COMMUNITY
Start: 2021-04-05 | End: 2022-05-03

## 2021-06-15 RX ORDER — LISINOPRIL AND HYDROCHLOROTHIAZIDE 25; 20 MG/1; MG/1
0.5 TABLET ORAL DAILY
COMMUNITY
Start: 2021-05-28

## 2021-06-15 RX ORDER — ATORVASTATIN CALCIUM 20 MG/1
20 TABLET, FILM COATED ORAL NIGHTLY
COMMUNITY
Start: 2021-04-06

## 2021-06-15 RX ORDER — TRIAMCINOLONE ACETONIDE 1 MG/G
1 CREAM TOPICAL 2 TIMES DAILY PRN
COMMUNITY

## 2021-06-15 RX ADMIN — DEXAMETHASONE SODIUM PHOSPHATE 8 MG: 4 INJECTION, SOLUTION INTRA-ARTICULAR; INTRALESIONAL; INTRAMUSCULAR; INTRAVENOUS; SOFT TISSUE at 11:30

## 2021-06-15 NOTE — PROGRESS NOTES
"Chief Complaint  Follow-up of the Right Hip and Follow-up of the Left Hip     Subjective      Jaxon Aggarwal presents to [facility] for a follow-up of bilateral hips. Patient has been experiencing bilateral hip pain for several years but within the last several months pain has significantly gotten worse. He localizes pain along the lateral aspect of his hip. He denies any groin pain. He reports right hip pain is worse than the left. We have been treating patient for trochanteric bursitis since we last him. Patient presents today using a cane for ambulation assistance. Patient states that his right hip is worse than the left. The injection did provide him with relief until 3 months ago. Patient also complains of low back pain that has been ongoing for about 3-4 months. He denies any injury to his lower back.     No Known Allergies     Social History     Socioeconomic History   • Marital status:      Spouse name: Not on file   • Number of children: Not on file   • Years of education: Not on file   • Highest education level: Not on file   Tobacco Use   • Smoking status: Former Smoker     Types: Cigarettes   • Smokeless tobacco: Never Used   • Tobacco comment: smoked 21-30 years, quit smoking at age 51, smoked 10 cigaretts per day   Vaping Use   • Vaping Use: Never used   Substance and Sexual Activity   • Alcohol use: Yes     Comment: drinks monthly beer   • Drug use: Never        Review of Systems     Objective   Vital Signs:   Pulse 73   Ht 170.2 cm (67\")   Wt 77.1 kg (170 lb)   SpO2 96%   BMI 26.63 kg/m²       Physical Exam  Constitutional:       Appearance: Normal appearance. He is well-developed and normal weight.   HENT:      Head: Normocephalic.      Right Ear: Hearing and external ear normal.      Left Ear: Hearing and external ear normal.      Nose: Nose normal.   Eyes:      Conjunctiva/sclera: Conjunctivae normal.   Cardiovascular:      Rate and Rhythm: Normal rate. "   Pulmonary:      Effort: Pulmonary effort is normal.      Breath sounds: No wheezing or rales.   Abdominal:      Palpations: Abdomen is soft.      Tenderness: There is no abdominal tenderness.   Musculoskeletal:      Cervical back: Normal range of motion.   Skin:     Findings: No rash.   Neurological:      Mental Status: He is alert and oriented to person, place, and time.   Psychiatric:         Mood and Affect: Mood and affect normal.         Judgment: Judgment normal.       Ortho Exam      BILATERAL HIPS: Tender greater trochanter. No swelling, skin discoloration or atrophy. Good tone of hip flexors, hip extensors, hip adductor, hip abductors. Good strength to hamstrings, quadriceps, dorsiflexors and plantar flexors. Dorsal Pedal Pulse 2+, posteriror tibialis pulse 2+. Skin intact. Neurovascular intact. Sensation grossly intact. Ambulation with a cane. Weight bearing. Non-antalgic gait.       Procedures      Imaging Results (Most Recent)     None           Result Review :          Assessment and Plan     DX: Bilateral hip trochanteric bursitis  Low back pain    Discussed treatment plans and diagnosis with the patient. Patient will continue use of topical gel, as it provides some relief. Patient was given a prescription for physical therapy for his bilateral hips and low back. Patient was given a injection and tolerated this procedure well. Patient was referred to Dr. Esparza for his low back pain.    Call or return if worsening symptoms.    Follow Up     Patient will follow-up on a PRN basis moving forward.       Patient was given instructions and counseling regarding his condition or for health maintenance advice. Please see specific information pulled into the AVS if appropriate.     Scribed for Cruzito Adan MD by Gisela Jiménez.  06/15/21   10:58 EDT    I have personally performed the services described in this document as scribed by the above individual and it is both accurate and complete.  Cruzito HARPER  MD [place] 06/15/21  10:58 EDT

## 2021-06-22 ENCOUNTER — TRANSCRIBE ORDERS (OUTPATIENT)
Dept: ADMINISTRATIVE | Facility: HOSPITAL | Age: 76
End: 2021-06-22

## 2021-06-22 DIAGNOSIS — R19.7 DIARRHEA, UNSPECIFIED TYPE: Primary | ICD-10-CM

## 2021-06-25 ENCOUNTER — HOSPITAL ENCOUNTER (OUTPATIENT)
Dept: CT IMAGING | Facility: HOSPITAL | Age: 76
Discharge: HOME | End: 2021-06-25
Admitting: INTERNAL MEDICINE

## 2021-06-25 DIAGNOSIS — R19.7 DIARRHEA, UNSPECIFIED TYPE: ICD-10-CM

## 2021-06-25 LAB — CREAT BLDA-MCNC: 0.8 MG/DL

## 2021-06-25 PROCEDURE — 0 IOPAMIDOL PER 1 ML: Performed by: INTERNAL MEDICINE

## 2021-06-25 PROCEDURE — 82565 ASSAY OF CREATININE: CPT

## 2021-06-25 PROCEDURE — 74177 CT ABD & PELVIS W/CONTRAST: CPT

## 2021-06-25 RX ADMIN — IOPAMIDOL 100 ML: 755 INJECTION, SOLUTION INTRAVENOUS at 14:53

## 2021-06-30 ENCOUNTER — TREATMENT (OUTPATIENT)
Dept: PHYSICAL THERAPY | Facility: CLINIC | Age: 76
End: 2021-06-30

## 2021-06-30 DIAGNOSIS — M25.651 HIP JOINT STIFFNESS, BILATERAL: ICD-10-CM

## 2021-06-30 DIAGNOSIS — M54.50 LOW BACK PAIN WITHOUT SCIATICA, UNSPECIFIED BACK PAIN LATERALITY, UNSPECIFIED CHRONICITY: ICD-10-CM

## 2021-06-30 DIAGNOSIS — M70.62 TROCHANTERIC BURSITIS OF BOTH HIPS: Primary | ICD-10-CM

## 2021-06-30 DIAGNOSIS — M25.652 HIP JOINT STIFFNESS, BILATERAL: ICD-10-CM

## 2021-06-30 DIAGNOSIS — M70.61 TROCHANTERIC BURSITIS OF BOTH HIPS: Primary | ICD-10-CM

## 2021-06-30 PROCEDURE — 97110 THERAPEUTIC EXERCISES: CPT | Performed by: PHYSICAL THERAPIST

## 2021-06-30 PROCEDURE — 97161 PT EVAL LOW COMPLEX 20 MIN: CPT | Performed by: PHYSICAL THERAPIST

## 2021-06-30 NOTE — PROGRESS NOTES
Physical Therapy Initial Evaluation and Plan of Care    Patient: Jaxon Aggarwal   : 1945  Diagnosis/ICD-10 Code:  Trochanteric bursitis of both hips [M70.61, M70.62]  Referring practitioner: Cruzito Adan MD  Date of Initial Visit: 2021  Today's Date: 2021  Patient seen for 1 sessions           Subjective Questionnaire: LEFS: 32/80 = 40% Function      Subjective Evaluation    History of Present Illness  Mechanism of injury: The patient presents to physical therapy with complaints of bilateral hip pain (right worse than left) and low back pain. He reported that he has been experiencing this pain for the last several years without a specific NARENDRA. The hip pain is located laterally over his greater trochanter. His back pain is centrally located and very rarely radiates down his right leg as a cold sensation. This hasn't happened in a while. He denies any n/t into his legs. His right hip has been feeling better since he received an injection at his ortho visit. He has no pain in his hips currently. His pain is increased with walking and prolonged standing. He currently uses rest and OTC pain medication (Tylenol) as needed for pain relief. He uses a cane for ambulation and has not experienced any falls in the past 12 months.    Pain  Current pain ratin  At best pain ratin  At worst pain ratin    Social Support  Lives with: spouse    Patient Goals  Patient goal: The patient would like to be able to walk longer distances without an assistive device and without increased pain.           Objective          Tenderness     Left Hip   Tenderness in the greater trochanter.     Right Hip   Tenderness in the greater trochanter.     Neurological Testing     Sensation     Lumbar   Left   Intact: light touch    Right   Intact: light touch    Comments   Left light touch: L2-S1 Dermatomal pattern  Right light touch: L2-S1 Dermatomal pattern    Additional Neurological Details  Seated slump: (+) on right  for increased sciatic neural tension.    Active Range of Motion     Lumbar   Flexion: 45 (pain limited) degrees   Extension: 15 degrees   Left lateral flexion: 2 (inches above knee) degrees   Right lateral flexion: 2 (inches above knee) degrees   Left rotation: 75 (%) degrees   Right rotation: 75 (%) degrees   Left Hip   Flexion: 108 degrees   External rotation (90/90): 40 degrees   Internal rotation (90/90): 25 degrees     Right Hip   Flexion: 105 degrees   External rotation (90/90): 35 degrees   Internal rotation (90/90): 20 degrees     Strength/Myotome Testing     Left Hip   Planes of Motion   Flexion: 4-  Extension: 3+  Abduction: 3+    Right Hip   Planes of Motion   Flexion: 4-  Extension: 3+  Abduction: 4-    Left Knee   Flexion: 4-  Extension: 4+    Right Knee   Flexion: 4+  Extension: 4+    Left Ankle/Foot   Dorsiflexion: 5    Right Ankle/Foot   Dorsiflexion: 5    Tests     Left Hip   Positive FRANCK.   Negative scour.     Right Hip   Positive FRANCK.   Negative scour.     Ambulation     Ambulation: Level Surfaces     Additional Level Surfaces Ambulation Details  The patient ambulates without an AD with slight left sided trunk shift and shortened step length with his left lower extremity.      See Exercise, Manual, and Modality Logs for complete treatment.     Assessment & Plan     Assessment  Impairments: abnormal gait, abnormal muscle firing, abnormal or restricted ROM, activity intolerance, impaired balance, impaired physical strength, lacks appropriate home exercise program, pain with function and weight-bearing intolerance  Assessment details: The patient presents to physical therapy with complaints of bilateral hip and low back pain with associated hip stiffness, lumbar stiffness, hip weakness, right sided sciatic neural tension, and functional deficits (LEFS). He would benefit from skilled PT intervention as described below to achieve long term PT goals.      Prognosis: good  Functional Limitations:  walking, uncomfortable because of pain, standing and stooping  Goals  Plan Goals: HIP PROBLEMS    1. The patient complains of bilateral hip pain.   LTG 1: 12 weeks:  The patient will report a pain rating of 2/10 or better at worst in order to improve  tolerance to activities of daily living and improve sleep quality.    STATUS:  New   STG 1a: 6 weeks:  The patient will report a pain rating of 3/10 or better at worst    STATUS:  New   TREATMENT:  Therapeutic exercises, manual therapy, aquatic therapy, home exercise   instruction, and modalities as needed for pain to include:  electrical stimulation, moist heat, ice,   ultrasound, and diathermy.    2. The patient demonstrates weakness of the bilateral hips.   LTG 2: 12 weeks:  The patient will demonstrate 4+/5 strength for bilateral hip flexion, abduction,  and extension in order to improve hip stability.    STATUS:  New   STG 2a: 6 weeks:  The patient will demonstrate 4/5 strength for bilateral hip flexion, abduction,  and extension.    STATUS:  New   TREATMENT: Therapeutic exercises, manual therapy, aquatic therapy, home exercise instruction,  and modalities as needed for pain to include:  electrical stimulation, moist heat, ice, and ultrasound    3. The patient has gait dysfunction.  LTG 3: 12 weeks:  The patient will ambulate without assistive device, independently, for community distances with normal biomechanics in order to improve mobility and allow patient to perform activities such as grocery shopping with greater ease.  STATUS:  New  STG 3a: The patient will be independent in [facility].  STATUS:  New  TREATMENT: Gait training, aquatic therapy, therapeutic exercise, and home exercise instruction.    4. Mobility: Walking/Moving Around Functional Limitation    LTG 4: 12 weeks:  The patient will demonstrate 37.5% limitation by achieving a score of 50/80 on the Lower Extremity Functional Scale.  STATUS:  New  STG 4a: 6 weeks:  The patient will demonstrate 50%  limitation by achieving a score of 40/80 on the Lower Extremity Functional Scale.    STATUS:  New  TREATMENT:  Manual therapy, therapeutic exercise, home exercise instruction, and modalities as needed to include: moist heat, electrical stimulation, and ultrasound.    Plan  Therapy options: will be seen for skilled physical therapy services  Planned modality interventions: cryotherapy, electrical stimulation/Russian stimulation and TENS  Planned therapy interventions: balance/weight-bearing training, ADL retraining, soft tissue mobilization, strengthening, stretching, therapeutic activities, joint mobilization, home exercise program, gait training, functional ROM exercises, flexibility, body mechanics training, postural training, neuromuscular re-education and manual therapy  Frequency: 2x week  Duration in weeks: 12  Treatment plan discussed with: patient        Visit Diagnoses:    ICD-10-CM ICD-9-CM   1. Trochanteric bursitis of both hips  M70.61 726.5    M70.62    2. Low back pain without sciatica, unspecified back pain laterality, unspecified chronicity  M54.5 724.2   3. Hip joint stiffness, bilateral  M25.651 719.55    M25.652        History # of Personal Factors and/or Comorbidities: LOW (0)  Examination of Body System(s): # of elements: LOW (1-2)  Clinical Presentation: STABLE   Clinical Decision Making: LOW       Timed:         Manual Therapy:    0     mins  05230;     Therapeutic Exercise:    8     mins  19074;     Neuromuscular Krupa:    0    mins  95528;    Therapeutic Activity:     0     mins  22321;     Gait Trainin     mins  89939;     Ultrasound:     0     mins  14718;    Ionto                               0    mins   00439  Self Care                       0     mins   43702  Canalith Repos    0     mins 01495      Un-Timed:  Electrical Stimulation:    0     mins  54807 ( );  Dry Needling     0     mins self-pay  Traction     0     mins 99184  Low Eval     35     Mins  84443  Mod Eval      0     Mins  03208  High Eval                       0     Mins  28609  Re-Eval                           0    mins  35456    Timed Treatment:   8   mins   Total Treatment:     43   mins    PT SIGNATURE: Kane Barrera PT         Initial Certification  Certification Period: 6/30/2021 thru 9/28/2021  I certify that the therapy services are furnished while this patient is under my care.  The services outlined above are required by this patient, and will be reviewed every 90 days.     PHYSICIAN: Cruzito Adan MD      DATE:     Please sign and return via fax to [phone]. Thank you, [facility].

## 2021-07-12 ENCOUNTER — TELEPHONE (OUTPATIENT)
Dept: PHYSICAL THERAPY | Facility: CLINIC | Age: 76
End: 2021-07-12

## 2021-07-16 ENCOUNTER — TELEPHONE (OUTPATIENT)
Dept: PHYSICAL THERAPY | Facility: CLINIC | Age: 76
End: 2021-07-16

## 2021-07-16 NOTE — TELEPHONE ENCOUNTER
HAS TO GO GET CHECKED OUT BY [facility] DUE TO AWFUL HEADACHES - WIFE ASKED TO CANCEL REMAINING APPTS TO FOCUS ON OTHER ISSUES AND HOPEFULLY GET BACK ON SCHEDULED ONCE THEY FIGURE OUT WHAT IS CAUSING HIS HEADACHES

## 2021-08-05 ENCOUNTER — OFFICE VISIT (OUTPATIENT)
Dept: ORTHOPEDIC SURGERY | Facility: CLINIC | Age: 76
End: 2021-08-05

## 2021-08-05 VITALS — BODY MASS INDEX: 26.68 KG/M2 | OXYGEN SATURATION: 97 % | HEIGHT: 67 IN | HEART RATE: 72 BPM | WEIGHT: 170 LBS

## 2021-08-05 DIAGNOSIS — M70.61 TROCHANTERIC BURSITIS OF BOTH HIPS: Primary | ICD-10-CM

## 2021-08-05 DIAGNOSIS — M70.62 TROCHANTERIC BURSITIS OF BOTH HIPS: Primary | ICD-10-CM

## 2021-08-05 DIAGNOSIS — M25.561 RIGHT KNEE PAIN, UNSPECIFIED CHRONICITY: ICD-10-CM

## 2021-08-05 DIAGNOSIS — M25.552 BILATERAL HIP PAIN: ICD-10-CM

## 2021-08-05 DIAGNOSIS — M25.551 BILATERAL HIP PAIN: ICD-10-CM

## 2021-08-05 PROCEDURE — 99213 OFFICE O/P EST LOW 20 MIN: CPT | Performed by: ORTHOPAEDIC SURGERY

## 2021-08-05 NOTE — PROGRESS NOTES
"Chief Complaint  Follow-up of the Right Hip and Follow-up of the Left Hip     Subjective      Jaxon Aggarwal presents to [facility] for a follow-up of bilateral hips. To review, patient has been experiencing bilateral hip pain for several years but within the last several months pain has significantly gotten worse. He localizes pain along the lateral aspect of his hip. He denies any groin pain. He reports right hip pain is worse than the left. He comes in using a cane for ambulation assistance. Patient states his right hip has begun hurting him for the last several days. We have treated patient in the past for bilateral trochanteric bursitis. He states pain is around the lateral aspect of bilateral hips. He states that his right hip pain is worse than the left. He states that his right knee feels \"funny\" as well.     No Known Allergies     Social History     Socioeconomic History   • Marital status:      Spouse name: Not on file   • Number of children: Not on file   • Years of education: Not on file   • Highest education level: Not on file   Tobacco Use   • Smoking status: Former Smoker     Types: Cigarettes   • Smokeless tobacco: Never Used   • Tobacco comment: smoked 21-30 years, quit smoking at age 51, smoked 10 cigaretts per day   Vaping Use   • Vaping Use: Never used   Substance and Sexual Activity   • Alcohol use: Yes     Comment: drinks monthly beer   • Drug use: Never        Review of Systems     Objective   Vital Signs:   Pulse 72   Ht 170.2 cm (67\")   Wt 77.1 kg (170 lb)   SpO2 97%   BMI 26.63 kg/m²       Physical Exam  Constitutional:       Appearance: Normal appearance. He is well-developed and normal weight.   HENT:      Head: Normocephalic.      Right Ear: Hearing and external ear normal.      Left Ear: Hearing and external ear normal.      Nose: Nose normal.   Eyes:      Conjunctiva/sclera: Conjunctivae normal.   Cardiovascular:      Rate and Rhythm: Normal " rate.   Pulmonary:      Effort: Pulmonary effort is normal.      Breath sounds: No wheezing or rales.   Abdominal:      Palpations: Abdomen is soft.      Tenderness: There is no abdominal tenderness.   Musculoskeletal:      Cervical back: Normal range of motion.   Skin:     Findings: No rash.   Neurological:      Mental Status: He is alert and oriented to person, place, and time.   Psychiatric:         Mood and Affect: Mood and affect normal.         Judgment: Judgment normal.       Ortho Exam      BILATERAL HIPS: No swelling, skin discoloration or atrophy. Good tone of hip flexors, hip extensors, hip adductor, hip abductors. Good strength to hamstrings, quadriceps, dorsiflexors and plantar flexors. Dorsal Pedal Pulse 2+, posteriror tibialis pulse 2+. Skin intact. Neurovascular intact. Sensation grossly intact. Ambulation with a cane. Weight bearing. Antalgic gait. Full leg raise.     RIGHT KNEE: Good strength to hamstrings, quadriceps, dorsiflexors and plantar flexors. Sensation grossly intact. Neurovascular intact. Calf supple, non-tender. No swelling or skin discoloration. Ambulation with a cane. Skin intact. Dorsal Pedal Pulse 2+, posterior tibialis pulse 2+. Full extension and flexion.       Procedures      Imaging Results (Most Recent)     Procedure Component Value Units Date/Time    XR Knee 3 View Right [[id number]] Resulted: 08/05/21 1501     Updated: 08/05/21 1508           Result Review :       X-Ray Report:  Right knee(s) X-Ray  Indication: Evaluation of right knee  AP, Lateral and Standing view(s)  Findings: No fracture or dislocation.   Prior studies available for comparison: no            Assessment and Plan     DX: Bilateral hip pain  Bilateral hip trochanteric bursitis     Discussed treatment plans and diagnosis with the patient. Patient states he had a recent surgery and has not done his home exercises. He will start this up again now that he is feeling better.     Call or return if worsening  symptoms.    Follow Up     PRN.       Patient was given instructions and counseling regarding his condition or for health maintenance advice. Please see specific information pulled into the AVS if appropriate.     Scribed for Cruzito Adan MD by Gisela Jiménez.  08/05/21   14:59 EDT      I have personally performed the services described in this document as scribed by the above individual and it is both accurate and complete. Cruzito Adan MD 08/06/21

## 2021-08-17 ENCOUNTER — TRANSCRIBE ORDERS (OUTPATIENT)
Dept: ADMINISTRATIVE | Facility: HOSPITAL | Age: 76
End: 2021-08-17

## 2021-08-17 ENCOUNTER — HOSPITAL ENCOUNTER (OUTPATIENT)
Dept: CARDIOLOGY | Facility: HOSPITAL | Age: 76
Discharge: HOME | End: 2021-08-17

## 2021-08-17 ENCOUNTER — LAB (OUTPATIENT)
Dept: LAB | Facility: HOSPITAL | Age: 76
End: 2021-08-17

## 2021-08-17 DIAGNOSIS — Z01.818 PRE-OP TESTING: Primary | ICD-10-CM

## 2021-08-17 DIAGNOSIS — Z01.818 PRE-OP TESTING: ICD-10-CM

## 2021-08-17 LAB
ALBUMIN SERPL-MCNC: 4.3 G/DL (ref 3.5–5.2)
ALBUMIN/GLOB SERPL: 1.5 G/DL
ALP SERPL-CCNC: 90 U/L (ref 39–117)
ALT SERPL W P-5'-P-CCNC: 24 U/L (ref 1–41)
ANION GAP SERPL CALCULATED.3IONS-SCNC: 8.6 MMOL/L (ref 5–15)
APTT PPP: 21.3 SECONDS (ref 22.2–34.2)
AST SERPL-CCNC: 17 U/L (ref 1–40)
BASOPHILS # BLD AUTO: 0.04 10*3/MM3 (ref 0–0.2)
BASOPHILS NFR BLD AUTO: 0.5 % (ref 0–1.5)
BILIRUB SERPL-MCNC: 0.3 MG/DL (ref 0–1.2)
BUN SERPL-MCNC: 16 MG/DL (ref 8–23)
BUN/CREAT SERPL: 18.8 (ref 7–25)
CALCIUM SPEC-SCNC: 10.8 MG/DL (ref 8.6–10.5)
CHLORIDE SERPL-SCNC: 101 MMOL/L (ref 98–107)
CO2 SERPL-SCNC: 28.4 MMOL/L (ref 22–29)
CREAT SERPL-MCNC: 0.85 MG/DL (ref 0.76–1.27)
DEPRECATED RDW RBC AUTO: 44.8 FL (ref 37–54)
EOSINOPHIL # BLD AUTO: 0.09 10*3/MM3 (ref 0–0.4)
EOSINOPHIL NFR BLD AUTO: 1 % (ref 0.3–6.2)
ERYTHROCYTE [DISTWIDTH] IN BLOOD BY AUTOMATED COUNT: 12.5 % (ref 12.3–15.4)
GFR SERPL CREATININE-BSD FRML MDRD: 88 ML/MIN/1.73
GLOBULIN UR ELPH-MCNC: 2.8 GM/DL
GLUCOSE SERPL-MCNC: 144 MG/DL (ref 65–99)
HCT VFR BLD AUTO: 44.4 % (ref 37.5–51)
HGB BLD-MCNC: 14.4 G/DL (ref 13–17.7)
IMM GRANULOCYTES # BLD AUTO: 0.07 10*3/MM3 (ref 0–0.05)
IMM GRANULOCYTES NFR BLD AUTO: 0.8 % (ref 0–0.5)
INR PPP: 0.93 (ref 2–3)
LYMPHOCYTES # BLD AUTO: 2.38 10*3/MM3 (ref 0.7–3.1)
LYMPHOCYTES NFR BLD AUTO: 27.5 % (ref 19.6–45.3)
MCH RBC QN AUTO: 31.3 PG (ref 26.6–33)
MCHC RBC AUTO-ENTMCNC: 32.4 G/DL (ref 31.5–35.7)
MCV RBC AUTO: 96.5 FL (ref 79–97)
MONOCYTES # BLD AUTO: 0.7 10*3/MM3 (ref 0.1–0.9)
MONOCYTES NFR BLD AUTO: 8.1 % (ref 5–12)
NEUTROPHILS NFR BLD AUTO: 5.38 10*3/MM3 (ref 1.7–7)
NEUTROPHILS NFR BLD AUTO: 62.1 % (ref 42.7–76)
NRBC BLD AUTO-RTO: 0 /100 WBC (ref 0–0.2)
PLATELET # BLD AUTO: 198 10*3/MM3 (ref 140–450)
PMV BLD AUTO: 12.7 FL (ref 6–12)
POTASSIUM SERPL-SCNC: 4.3 MMOL/L (ref 3.5–5.2)
PROT SERPL-MCNC: 7.1 G/DL (ref 6–8.5)
PROTHROMBIN TIME: 10.3 SECONDS (ref 9.4–12)
RBC # BLD AUTO: 4.6 10*6/MM3 (ref 4.14–5.8)
SODIUM SERPL-SCNC: 138 MMOL/L (ref 136–145)
WBC # BLD AUTO: 8.66 10*3/MM3 (ref 3.4–10.8)

## 2021-08-17 PROCEDURE — 80053 COMPREHEN METABOLIC PANEL: CPT

## 2021-08-17 PROCEDURE — 93005 ELECTROCARDIOGRAM TRACING: CPT

## 2021-08-17 PROCEDURE — 85025 COMPLETE CBC W/AUTO DIFF WBC: CPT

## 2021-08-17 PROCEDURE — 36415 COLL VENOUS BLD VENIPUNCTURE: CPT

## 2021-08-17 PROCEDURE — 93010 ELECTROCARDIOGRAM REPORT: CPT | Performed by: INTERNAL MEDICINE

## 2021-08-17 PROCEDURE — 85610 PROTHROMBIN TIME: CPT

## 2021-08-17 PROCEDURE — 85730 THROMBOPLASTIN TIME PARTIAL: CPT

## 2021-08-18 LAB — QT INTERVAL: 361 MS

## 2021-08-25 ENCOUNTER — OFFICE VISIT (OUTPATIENT)
Dept: OTOLARYNGOLOGY | Facility: CLINIC | Age: 76
End: 2021-08-25

## 2021-08-25 VITALS — BODY MASS INDEX: 26.68 KG/M2 | RESPIRATION RATE: 16 BRPM | HEIGHT: 67 IN | WEIGHT: 170 LBS

## 2021-08-25 DIAGNOSIS — R51.9 ACUTE NONINTRACTABLE HEADACHE, UNSPECIFIED HEADACHE TYPE: ICD-10-CM

## 2021-08-25 DIAGNOSIS — R05.3 CHRONIC COUGH: Primary | ICD-10-CM

## 2021-08-25 DIAGNOSIS — J30.1 SEASONAL ALLERGIC RHINITIS DUE TO POLLEN: ICD-10-CM

## 2021-08-25 PROCEDURE — 99214 OFFICE O/P EST MOD 30 MIN: CPT | Performed by: OTOLARYNGOLOGY

## 2021-08-25 RX ORDER — HYDROCODONE BITARTRATE AND ACETAMINOPHEN 5; 325 MG/1; MG/1
TABLET ORAL
COMMUNITY
Start: 2021-08-23 | End: 2022-05-03

## 2021-08-25 RX ORDER — ACETAMINOPHEN 325 MG/1
650 TABLET ORAL EVERY 6 HOURS PRN
COMMUNITY
End: 2022-10-10

## 2021-08-26 ENCOUNTER — APPOINTMENT (OUTPATIENT)
Dept: RESPIRATORY THERAPY | Facility: HOSPITAL | Age: 76
End: 2021-08-26

## 2021-08-26 NOTE — PROGRESS NOTES
"Patient Name: Jaxon Aggarwal   Visit Date: 08/25/2021   Patient ID: [id number]  Provider: Andrew Peraza MD    Sex: male  Location: [facility]   YOB: 1945  Location Address: [address] [place] [address], [address],?[place]?[id number]    Primary Care Provider Max Castillo MD  Location Phone: [phone]    Referring Provider: No ref. provider found        Chief Complaint  Headache (Issue for 3 months )    Subjective    History of Present Illness  Jaxon Aggarwal is a 75 y.o. male who presents to [facility] today as a consult from No ref. provider found.    He presents the clinic today for evaluation of intermittent headaches which she has after coughing.  He was not sure if these are related to allergic rhinitis and sinusitis, and presents to \"make sure he is covering all of his bases.\"  He has not had any nasal congestion associated with these headaches, and notes that the headaches are very short lasting and always after coughing fits.  The headaches last less than 1 hour.  He has not had any purulent sinusitis and has not been on antibiotics for sinusitis.  He does have a complicated history and has a  shunt that was recently revised by neurosurgery.  He notes that prior to the first shunt being placed he did not have headaches with coughing.  I have asked him about CSF pressures and if this was tested, and he is not sure.  He did have some recent imaging at an outside facility in [place] and I did not have this available for my review.  It certainly does not seem like his symptoms correspond with sinus etiology.    March 5, 2021 clinic visit:    · He presents the clinic today for reevaluation regarding allergic rhinitis, nasal congestion, and ear abnormality noted by our audiologist. He does have a longstanding hearing loss, and is due to have his hearing aids reprogrammed. On exam, the audiologist saw something abnormal in his ear " and sent him for further evaluation. The patient notes no ear pain or drainage. He notes that his hearing has been stable, and he is getting good benefit from his hearing aids. Exam revealed a piece of hearing aid fitting in the right ear canal which is able to remove with alligator forceps. His eardrums do have some changes from previous infections, but are well aerated and are free of any fluid or infection.He does complain about clear nasal drainage and allergies. In the last clinic visit in 2018 I recommended nasal saline irrigations, but he has not been using these. He denies any epistaxis, purulent drainage, or facial pain or pressure. On exam he has some clear nasal drainage and congestion, and I recommended using nasal saline irrigations for this.    Past Medical History:   Diagnosis Date   • Arthritis    • BPH (benign prostatic hyperplasia)    • Chronic allergic rhinitis    • GERD (gastroesophageal reflux disease)    • Headache 06/22/2021   • Hearing loss    • Hypertension    • Hypogonadism in male    • Imbalance    • Mood disorder (CMS/HCC)    • Otitis media    • Prostate disorder    • Seasonal allergies    • Vertigo        Past Surgical History:   Procedure Laterality Date   • CYSTOSCOPY     • EYE SURGERY      Cataract surgery   • EYE SURGERY      Eye implant, yes   • OTHER SURGICAL HISTORY      Hernia   • TONSILLECTOMY           Current Outpatient Medications:   •  atorvastatin (LIPITOR) 20 MG tablet, Take 20 mg by mouth Daily., Disp: , Rfl:   •  Diclofenac Sodium (VOLTAREN) 1 % gel gel, Apply 4 g topically to the appropriate area as directed 4 (Four) Times a Day., Disp: 100 g, Rfl: 2  •  diphenoxylate-atropine (LOMOTIL) 2.5-0.025 MG per tablet, , Disp: , Rfl:   •  finasteride (PROSCAR) 5 MG tablet, , Disp: , Rfl:   •  HYDROcodone-acetaminophen (NORCO) 5-325 MG per tablet, , Disp: , Rfl:   •  lisinopril-hydrochlorothiazide (PRINZIDE,ZESTORETIC) 20-25 MG per tablet, Take 1 tablet by mouth Daily., Disp: ,  "Rfl:   •  metFORMIN (GLUCOPHAGE) 500 MG tablet, , Disp: , Rfl:   •  tamsulosin (FLOMAX) 0.4 MG capsule 24 hr capsule, , Disp: , Rfl:   •  triamcinolone (KENALOG) 0.1 % cream, triamcinolone acetonide 0.1 % topical cream apply a thin layer to the affected area(s) by topical route 2 times per day   Active, Disp: , Rfl:   •  acetaminophen (TYLENOL) 325 MG tablet, Take 650 mg by mouth., Disp: , Rfl:   •  cholecalciferol (Cholecalciferol) 25 MCG (1000 UT) tablet, Take 1,000 Units by mouth Daily., Disp: , Rfl:   •  donepezil (ARICEPT) 10 MG tablet, Take 10 mg by mouth Daily., Disp: , Rfl:   •  FLUoxetine (PROzac) 40 MG capsule, Take 40 mg by mouth Every Morning., Disp: , Rfl:   •  meclizine (ANTIVERT) 25 MG tablet, TAKE 1/2 TO 1 TABLET BY MOUTH THREE TIMES DAILY, Disp: , Rfl:   •  triazolam (HALCION) 0.125 MG tablet, Take 0.125 mg by mouth., Disp: , Rfl:      No Known Allergies    Family History   Problem Relation Age of Onset   • Stroke Mother    • Cancer Father    • Diabetes Other         Mellitus        Social History     Social History Narrative   • Not on file       Objective     Vital Signs:   Resp 16   Ht 170.2 cm (67\")   Wt 77.1 kg (170 lb)   BMI 26.63 kg/m²       Physical Exam         Constitutional   Appearance  · : well developed, well-nourished, alert and in no acute distress, voice clear and strong    Head  Inspection  · : no deformities or lesions  Face  Inspection  · : No facial lesions; House-Brackmann I/VI bilaterally  Palpation  · : No TMJ crepitus nor  muscle tenderness bilaterally    Eyes  Vision  Visual Fields  · : Extraocular movements are intact. No spontaneous or gaze-induced nystagmus.  Conjunctivae  · : clear  Sclerae  · : clear  Pupils and Irises  · : pupils equal, round, and reactive to light.     Ears, Nose, Mouth and Throat    Ears    External Ears  · : appearance within normal limits, no lesions present  Otoscopic Examination  · : Tympanic membrane appearance within normal limits " bilaterally without perforations, well-aerated middle ears  Hearing  · : intact to conversational voice both ears  Tunning fork testing:     :    Nose    External Nose  · : appearance normal  Intranasal Exam  · : mucosa within normal limits, vestibules normal, no intranasal lesions present, septum midline, sinuses non tender to percussion  Oral Cavity    Oral Mucosa  · : oral mucosa normal without pallor or cyanosis  Lips  · : lip appearance normal  Teeth  · : normal dentition for age  Gums  · : gums pink, non-swollen, no bleeding present  Tongue  · : tongue appearance normal; normal mobility  Palate  · : hard palate normal, soft palate appearance normal with symmetric mobility    Throat    Oropharynx  · : no inflammation or lesions present, tonsils within normal limits  Hypopharynx  · : appearance within normal limits, superior epiglottis within normal limits  Larynx  · : appearance within normal limits, vocal cords within normal limits, no lesions present    Neck  Inspection/Palpation  · : normal appearance, no masses or tenderness, trachea midline; thyroid size normal, nontender, no nodules or masses present on palpation    Respiratory  Respiratory Effort  · : breathing unlabored  Inspection of Chest  · : normal appearance, no retractions    Cardiovascular  Heart  · : regular rate and rhythm    Lymphatic  Neck  · : no lymphadenopathy present  Supraclavicular Nodes  · : no lymphadenopathy present  Preauricular Nodes  · : no lymphadenopathy present    Skin and Subcutaneous Tissue  General Inspection  · : Regarding face and neck - there are no rashes present, no lesions present, and no areas of discoloration    Neurologic  Cranial Nerves  · : cranial nerves II-XII are grossly intact bilaterally  Gait and Station  · : normal gait, able to stand without diffculty    Psychiatric  Judgement and Insight  · : judgment and insight intact  Mood and Affect  · : mood normal, affect appropriate          Assessment and Plan     Diagnoses and all orders for this visit:    1. Chronic cough (Primary)    2. Acute nonintractable headache, unspecified headache type    3. Seasonal allergic rhinitis due to pollen    Examination today revealed normal mucous membranes with no evidence of polyps, lesions, or purulence within the nose.  He notes that his headaches are frontal and always associated with coughing.  This may be due to fluctuations of CSF pressure, but this question would best be asked to those who have been caring for his  shunt.  I have asked him to bring in the CT scans and imaging he had for my review to rule out any sinus etiology, but is certainly does not seem that way from his history.  I will be glad to see him back if there is any worsening sinus issues.    Follow Up   No follow-ups on file.  Patient was given instructions and counseling regarding his condition or for health maintenance advice. Please see specific information pulled into the AVS if appropriate.

## 2021-11-01 ENCOUNTER — LAB (OUTPATIENT)
Dept: LAB | Facility: HOSPITAL | Age: 76
End: 2021-11-01

## 2021-11-01 ENCOUNTER — TRANSCRIBE ORDERS (OUTPATIENT)
Dept: LAB | Facility: HOSPITAL | Age: 76
End: 2021-11-01

## 2021-11-01 DIAGNOSIS — E11.00 TYPE II DIABETES MELLITUS WITH HYPEROSMOLARITY, UNCONTROLLED (HCC): ICD-10-CM

## 2021-11-01 DIAGNOSIS — E78.2 MIXED HYPERLIPIDEMIA: ICD-10-CM

## 2021-11-01 DIAGNOSIS — E11.65 TYPE II DIABETES MELLITUS WITH HYPEROSMOLARITY, UNCONTROLLED (HCC): ICD-10-CM

## 2021-11-01 DIAGNOSIS — E11.00 TYPE II DIABETES MELLITUS WITH HYPEROSMOLARITY, UNCONTROLLED (HCC): Primary | ICD-10-CM

## 2021-11-01 DIAGNOSIS — I10 ESSENTIAL HYPERTENSION, MALIGNANT: ICD-10-CM

## 2021-11-01 DIAGNOSIS — E11.65 TYPE II DIABETES MELLITUS WITH HYPEROSMOLARITY, UNCONTROLLED (HCC): Primary | ICD-10-CM

## 2021-11-01 LAB
ALBUMIN SERPL-MCNC: 4.4 G/DL (ref 3.5–5.2)
ALBUMIN/GLOB SERPL: 1.9 G/DL
ALP SERPL-CCNC: 70 U/L (ref 39–117)
ALT SERPL W P-5'-P-CCNC: 23 U/L (ref 1–41)
ANION GAP SERPL CALCULATED.3IONS-SCNC: 9.2 MMOL/L (ref 5–15)
AST SERPL-CCNC: 17 U/L (ref 1–40)
BASOPHILS # BLD AUTO: 0.03 10*3/MM3 (ref 0–0.2)
BASOPHILS NFR BLD AUTO: 0.4 % (ref 0–1.5)
BILIRUB SERPL-MCNC: 0.5 MG/DL (ref 0–1.2)
BUN SERPL-MCNC: 11 MG/DL (ref 8–23)
BUN/CREAT SERPL: 13.6 (ref 7–25)
CALCIUM SPEC-SCNC: 9.9 MG/DL (ref 8.6–10.5)
CHLORIDE SERPL-SCNC: 104 MMOL/L (ref 98–107)
CHOLEST SERPL-MCNC: 127 MG/DL (ref 0–200)
CO2 SERPL-SCNC: 26.8 MMOL/L (ref 22–29)
CREAT SERPL-MCNC: 0.81 MG/DL (ref 0.76–1.27)
DEPRECATED RDW RBC AUTO: 46 FL (ref 37–54)
EOSINOPHIL # BLD AUTO: 0.21 10*3/MM3 (ref 0–0.4)
EOSINOPHIL NFR BLD AUTO: 2.5 % (ref 0.3–6.2)
ERYTHROCYTE [DISTWIDTH] IN BLOOD BY AUTOMATED COUNT: 12.7 % (ref 12.3–15.4)
GFR SERPL CREATININE-BSD FRML MDRD: 93 ML/MIN/1.73
GLOBULIN UR ELPH-MCNC: 2.3 GM/DL
GLUCOSE SERPL-MCNC: 188 MG/DL (ref 65–99)
HBA1C MFR BLD: 7.5 % (ref 4.8–5.6)
HCT VFR BLD AUTO: 46.3 % (ref 37.5–51)
HDLC SERPL-MCNC: 39 MG/DL (ref 40–60)
HGB BLD-MCNC: 14.5 G/DL (ref 13–17.7)
IMM GRANULOCYTES # BLD AUTO: 0.07 10*3/MM3 (ref 0–0.05)
IMM GRANULOCYTES NFR BLD AUTO: 0.8 % (ref 0–0.5)
LDLC SERPL CALC-MCNC: 69 MG/DL (ref 0–100)
LDLC/HDLC SERPL: 1.72 {RATIO}
LYMPHOCYTES # BLD AUTO: 1.74 10*3/MM3 (ref 0.7–3.1)
LYMPHOCYTES NFR BLD AUTO: 20.5 % (ref 19.6–45.3)
MCH RBC QN AUTO: 30.5 PG (ref 26.6–33)
MCHC RBC AUTO-ENTMCNC: 31.3 G/DL (ref 31.5–35.7)
MCV RBC AUTO: 97.5 FL (ref 79–97)
MONOCYTES # BLD AUTO: 0.54 10*3/MM3 (ref 0.1–0.9)
MONOCYTES NFR BLD AUTO: 6.4 % (ref 5–12)
NEUTROPHILS NFR BLD AUTO: 5.89 10*3/MM3 (ref 1.7–7)
NEUTROPHILS NFR BLD AUTO: 69.4 % (ref 42.7–76)
NRBC BLD AUTO-RTO: 0 /100 WBC (ref 0–0.2)
PLATELET # BLD AUTO: 245 10*3/MM3 (ref 140–450)
PMV BLD AUTO: 11.4 FL (ref 6–12)
POTASSIUM SERPL-SCNC: 5 MMOL/L (ref 3.5–5.2)
PROT SERPL-MCNC: 6.7 G/DL (ref 6–8.5)
RBC # BLD AUTO: 4.75 10*6/MM3 (ref 4.14–5.8)
SODIUM SERPL-SCNC: 140 MMOL/L (ref 136–145)
TRIGL SERPL-MCNC: 104 MG/DL (ref 0–150)
TSH SERPL DL<=0.05 MIU/L-ACNC: 1.89 UIU/ML (ref 0.27–4.2)
VLDLC SERPL-MCNC: 19 MG/DL (ref 5–40)
WBC # BLD AUTO: 8.48 10*3/MM3 (ref 3.4–10.8)

## 2021-11-01 PROCEDURE — 84443 ASSAY THYROID STIM HORMONE: CPT

## 2021-11-01 PROCEDURE — 36415 COLL VENOUS BLD VENIPUNCTURE: CPT

## 2021-11-01 PROCEDURE — 83036 HEMOGLOBIN GLYCOSYLATED A1C: CPT

## 2021-11-01 PROCEDURE — 85025 COMPLETE CBC W/AUTO DIFF WBC: CPT

## 2021-11-01 PROCEDURE — 80053 COMPREHEN METABOLIC PANEL: CPT

## 2021-11-01 PROCEDURE — 80061 LIPID PANEL: CPT

## 2021-11-02 ENCOUNTER — LAB (OUTPATIENT)
Dept: LAB | Facility: HOSPITAL | Age: 76
End: 2021-11-02

## 2021-11-02 DIAGNOSIS — I10 ESSENTIAL HYPERTENSION, MALIGNANT: ICD-10-CM

## 2021-11-02 DIAGNOSIS — E11.65 TYPE II DIABETES MELLITUS WITH HYPEROSMOLARITY, UNCONTROLLED (HCC): ICD-10-CM

## 2021-11-02 DIAGNOSIS — E11.00 TYPE II DIABETES MELLITUS WITH HYPEROSMOLARITY, UNCONTROLLED (HCC): ICD-10-CM

## 2021-11-02 DIAGNOSIS — E78.2 MIXED HYPERLIPIDEMIA: ICD-10-CM

## 2021-11-02 LAB — ALBUMIN UR-MCNC: 2.8 MG/DL

## 2021-11-02 PROCEDURE — 82043 UR ALBUMIN QUANTITATIVE: CPT

## 2021-11-18 ENCOUNTER — OFFICE VISIT (OUTPATIENT)
Dept: ORTHOPEDIC SURGERY | Facility: CLINIC | Age: 76
End: 2021-11-18

## 2021-11-18 VITALS — BODY MASS INDEX: 25.9 KG/M2 | HEIGHT: 67 IN | WEIGHT: 165 LBS | HEART RATE: 100 BPM | OXYGEN SATURATION: 96 %

## 2021-11-18 DIAGNOSIS — M70.62 TROCHANTERIC BURSITIS OF BOTH HIPS: Primary | ICD-10-CM

## 2021-11-18 DIAGNOSIS — M70.61 TROCHANTERIC BURSITIS OF BOTH HIPS: Primary | ICD-10-CM

## 2021-11-18 PROCEDURE — 96372 THER/PROPH/DIAG INJ SC/IM: CPT | Performed by: ORTHOPAEDIC SURGERY

## 2021-11-18 PROCEDURE — 99213 OFFICE O/P EST LOW 20 MIN: CPT | Performed by: ORTHOPAEDIC SURGERY

## 2021-11-18 RX ORDER — DEXAMETHASONE SODIUM PHOSPHATE 4 MG/ML
8 INJECTION, SOLUTION INTRA-ARTICULAR; INTRALESIONAL; INTRAMUSCULAR; INTRAVENOUS; SOFT TISSUE ONCE
Status: COMPLETED | OUTPATIENT
Start: 2021-11-18 | End: 2021-11-18

## 2021-11-18 RX ORDER — BUTALBITAL, ACETAMINOPHEN AND CAFFEINE 50; 325; 40 MG/1; MG/1; MG/1
TABLET ORAL EVERY 4 HOURS PRN
COMMUNITY
Start: 2021-11-02

## 2021-11-18 RX ORDER — GABAPENTIN 300 MG/1
300 CAPSULE ORAL NIGHTLY
COMMUNITY
Start: 2021-11-01

## 2021-11-18 RX ORDER — ASPIRIN 81 MG/1
81 TABLET, CHEWABLE ORAL DAILY
COMMUNITY
End: 2022-10-28 | Stop reason: HOSPADM

## 2021-11-18 RX ORDER — AMITRIPTYLINE HYDROCHLORIDE 25 MG/1
25 TABLET, FILM COATED ORAL NIGHTLY
COMMUNITY
Start: 2021-11-02 | End: 2022-08-30

## 2021-11-18 RX ORDER — MEMANTINE HYDROCHLORIDE 5 MG/1
10 TABLET ORAL DAILY
COMMUNITY
Start: 2021-11-02 | End: 2022-08-30 | Stop reason: DRUGHIGH

## 2021-11-18 RX ADMIN — DEXAMETHASONE SODIUM PHOSPHATE 8 MG: 4 INJECTION, SOLUTION INTRA-ARTICULAR; INTRALESIONAL; INTRAMUSCULAR; INTRAVENOUS; SOFT TISSUE at 13:50

## 2021-11-18 NOTE — PROGRESS NOTES
"Chief Complaint  Follow-up of the Left Hip and Follow-up of the Right Hip     Subjective      Jaxon Aggarwal presents to [facility] for a follow-up of bilateral hip pain. Patient has bilateral hip trochanteric bursitis that has been ongoing for several years. The last several months pain has increasingly gotten worse on the lateral aspect of each hip. His right hip hurts him more than the left. Patient denies any groin pain. Patient states he has been having increasing right hip pain in the last week. He states due to increasing pain, his right knee started to cause him pain. He is using a cane for ambulation assistance. He states his left hip doesn't bother him much right now.     No Known Allergies     Social History     Socioeconomic History   • Marital status:    Tobacco Use   • Smoking status: Former Smoker     Packs/day: 1.00     Years: 15.00     Pack years: 15.00     Types: Cigarettes     Quit date: 1997     Years since quittin.4   • Smokeless tobacco: Never Used   • Tobacco comment: smoked 21-30 years, quit smoking at age 51, smoked 10 cigaretts per day   Vaping Use   • Vaping Use: Never used   Substance and Sexual Activity   • Alcohol use: Yes     Alcohol/week: 0.0 standard drinks     Comment: drinks monthly beer   • Drug use: Never        Review of Systems     Objective   Vital Signs:   Pulse 100   Ht 170.2 cm (67\")   Wt 74.8 kg (165 lb)   SpO2 96%   BMI 25.84 kg/m²       Physical Exam  Constitutional:       Appearance: Normal appearance. Patient is well-developed and normal weight.   HENT:      Head: Normocephalic.      Right Ear: Hearing and external ear normal.      Left Ear: Hearing and external ear normal.      Nose: Nose normal.   Eyes:      Conjunctiva/sclera: Conjunctivae normal.   Cardiovascular:      Rate and Rhythm: Normal rate.   Pulmonary:      Effort: Pulmonary effort is normal.      Breath sounds: No wheezing or rales.   Abdominal:      " Palpations: Abdomen is soft.      Tenderness: There is no abdominal tenderness.   Musculoskeletal:      Cervical back: Normal range of motion.   Skin:     Findings: No rash.   Neurological:      Mental Status: Patient is alert and oriented to person, place, and time.   Psychiatric:         Mood and Affect: Mood and affect normal.         Judgment: Judgment normal.       Ortho Exam      BILATERAL HIPS: Tender greater trochanter. Good tone of hip flexors, hip extensors, hip adductor, hip abductors. Good strength to hamstrings, quadriceps, dorsiflexors and plantar flexors. Ambulation with a cane. Non-tender hip and pelvic muscles. No groin pain with hip range of motion. Calf supple, non-tender, no signs of DVT. Dorsal Pedal Pulse 2+, posterior tibialis pulse 2+. Full weight bearing. Antalgic gait.       Procedures      Imaging Results (Most Recent)     None           Result Review :         No results found.          Assessment and Plan     DX: Bilateral trochanteric bursitis     Patient given an Im injection, he tolerated this well. Patient advised to start his trochanteric bursitis stretches.     Call or return if worsening symptoms.    Follow Up     PRN.       Patient was given instructions and counseling regarding his condition or for health maintenance advice. Please see specific information pulled into the AVS if appropriate.     Scribed for Cruzito Adan MD by Gisela Jiménez.  11/18/21   13:23 EST        I have personally performed the services described in this document as scribed by the above individual and it is both accurate and complete. Cruzito Adan MD 11/19/21

## 2021-12-30 ENCOUNTER — LAB (OUTPATIENT)
Dept: LAB | Facility: HOSPITAL | Age: 76
End: 2021-12-30

## 2021-12-30 ENCOUNTER — TRANSCRIBE ORDERS (OUTPATIENT)
Dept: LAB | Facility: HOSPITAL | Age: 76
End: 2021-12-30

## 2021-12-30 DIAGNOSIS — E11.00 TYPE II DIABETES MELLITUS WITH HYPEROSMOLARITY, UNCONTROLLED: ICD-10-CM

## 2021-12-30 DIAGNOSIS — I10 ESSENTIAL HYPERTENSION, MALIGNANT: Primary | ICD-10-CM

## 2021-12-30 DIAGNOSIS — E11.65 TYPE II DIABETES MELLITUS WITH HYPEROSMOLARITY, UNCONTROLLED: ICD-10-CM

## 2021-12-30 DIAGNOSIS — M81.0 SENILE OSTEOPOROSIS: ICD-10-CM

## 2021-12-30 DIAGNOSIS — I10 ESSENTIAL HYPERTENSION, MALIGNANT: ICD-10-CM

## 2021-12-30 LAB
25(OH)D3 SERPL-MCNC: 42.4 NG/ML
ALBUMIN SERPL-MCNC: 4.7 G/DL (ref 3.5–5.2)
ALBUMIN UR-MCNC: 1.7 MG/DL
ALBUMIN/GLOB SERPL: 1.9 G/DL
ALP SERPL-CCNC: 69 U/L (ref 39–117)
ALT SERPL W P-5'-P-CCNC: 28 U/L (ref 1–41)
ANION GAP SERPL CALCULATED.3IONS-SCNC: 9.6 MMOL/L (ref 5–15)
AST SERPL-CCNC: 19 U/L (ref 1–40)
BASOPHILS # BLD AUTO: 0.04 10*3/MM3 (ref 0–0.2)
BASOPHILS NFR BLD AUTO: 0.5 % (ref 0–1.5)
BILIRUB SERPL-MCNC: 0.4 MG/DL (ref 0–1.2)
BUN SERPL-MCNC: 17 MG/DL (ref 8–23)
BUN/CREAT SERPL: 18.7 (ref 7–25)
CALCIUM SPEC-SCNC: 9.6 MG/DL (ref 8.6–10.5)
CHLORIDE SERPL-SCNC: 100 MMOL/L (ref 98–107)
CHOLEST SERPL-MCNC: 132 MG/DL (ref 0–200)
CO2 SERPL-SCNC: 26.4 MMOL/L (ref 22–29)
CREAT SERPL-MCNC: 0.91 MG/DL (ref 0.76–1.27)
DEPRECATED RDW RBC AUTO: 45.8 FL (ref 37–54)
EOSINOPHIL # BLD AUTO: 0.17 10*3/MM3 (ref 0–0.4)
EOSINOPHIL NFR BLD AUTO: 2.2 % (ref 0.3–6.2)
ERYTHROCYTE [DISTWIDTH] IN BLOOD BY AUTOMATED COUNT: 12.9 % (ref 12.3–15.4)
GFR SERPL CREATININE-BSD FRML MDRD: 81 ML/MIN/1.73
GLOBULIN UR ELPH-MCNC: 2.5 GM/DL
GLUCOSE SERPL-MCNC: 163 MG/DL (ref 65–99)
HBA1C MFR BLD: 7.36 % (ref 4.8–5.6)
HCT VFR BLD AUTO: 44.5 % (ref 37.5–51)
HDLC SERPL-MCNC: 41 MG/DL (ref 40–60)
HGB BLD-MCNC: 14.6 G/DL (ref 13–17.7)
IMM GRANULOCYTES # BLD AUTO: 0.07 10*3/MM3 (ref 0–0.05)
IMM GRANULOCYTES NFR BLD AUTO: 0.9 % (ref 0–0.5)
LDLC SERPL CALC-MCNC: 72 MG/DL (ref 0–100)
LDLC/HDLC SERPL: 1.72 {RATIO}
LYMPHOCYTES # BLD AUTO: 1.74 10*3/MM3 (ref 0.7–3.1)
LYMPHOCYTES NFR BLD AUTO: 23 % (ref 19.6–45.3)
MCH RBC QN AUTO: 31.5 PG (ref 26.6–33)
MCHC RBC AUTO-ENTMCNC: 32.8 G/DL (ref 31.5–35.7)
MCV RBC AUTO: 95.9 FL (ref 79–97)
MONOCYTES # BLD AUTO: 0.61 10*3/MM3 (ref 0.1–0.9)
MONOCYTES NFR BLD AUTO: 8.1 % (ref 5–12)
NEUTROPHILS NFR BLD AUTO: 4.94 10*3/MM3 (ref 1.7–7)
NEUTROPHILS NFR BLD AUTO: 65.3 % (ref 42.7–76)
NRBC BLD AUTO-RTO: 0 /100 WBC (ref 0–0.2)
PLATELET # BLD AUTO: 253 10*3/MM3 (ref 140–450)
PMV BLD AUTO: 11.6 FL (ref 6–12)
POTASSIUM SERPL-SCNC: 4.3 MMOL/L (ref 3.5–5.2)
PROT SERPL-MCNC: 7.2 G/DL (ref 6–8.5)
RBC # BLD AUTO: 4.64 10*6/MM3 (ref 4.14–5.8)
SODIUM SERPL-SCNC: 136 MMOL/L (ref 136–145)
T4 FREE SERPL-MCNC: 1.34 NG/DL (ref 0.93–1.7)
TRIGL SERPL-MCNC: 103 MG/DL (ref 0–150)
TSH SERPL DL<=0.05 MIU/L-ACNC: 1.72 UIU/ML (ref 0.27–4.2)
VLDLC SERPL-MCNC: 19 MG/DL (ref 5–40)
WBC NRBC COR # BLD: 7.57 10*3/MM3 (ref 3.4–10.8)

## 2021-12-30 PROCEDURE — 84681 ASSAY OF C-PEPTIDE: CPT

## 2021-12-30 PROCEDURE — 82306 VITAMIN D 25 HYDROXY: CPT

## 2021-12-30 PROCEDURE — 83036 HEMOGLOBIN GLYCOSYLATED A1C: CPT

## 2021-12-30 PROCEDURE — 80053 COMPREHEN METABOLIC PANEL: CPT

## 2021-12-30 PROCEDURE — 36415 COLL VENOUS BLD VENIPUNCTURE: CPT

## 2021-12-30 PROCEDURE — 82043 UR ALBUMIN QUANTITATIVE: CPT

## 2021-12-30 PROCEDURE — 84439 ASSAY OF FREE THYROXINE: CPT

## 2021-12-30 PROCEDURE — 85025 COMPLETE CBC W/AUTO DIFF WBC: CPT

## 2021-12-30 PROCEDURE — 84443 ASSAY THYROID STIM HORMONE: CPT

## 2021-12-30 PROCEDURE — 80061 LIPID PANEL: CPT

## 2021-12-31 LAB — C PEPTIDE SERPL-MCNC: 5 NG/ML (ref 1.1–4.4)

## 2022-03-10 ENCOUNTER — TRANSCRIBE ORDERS (OUTPATIENT)
Dept: LAB | Facility: HOSPITAL | Age: 77
End: 2022-03-10

## 2022-03-10 DIAGNOSIS — I10 ESSENTIAL HYPERTENSION, MALIGNANT: ICD-10-CM

## 2022-03-10 DIAGNOSIS — E11.00 TYPE II DIABETES MELLITUS WITH HYPEROSMOLARITY, UNCONTROLLED: Primary | ICD-10-CM

## 2022-03-10 DIAGNOSIS — E11.65 TYPE II DIABETES MELLITUS WITH HYPEROSMOLARITY, UNCONTROLLED: Primary | ICD-10-CM

## 2022-03-10 DIAGNOSIS — E78.2 MIXED HYPERLIPIDEMIA: ICD-10-CM

## 2022-03-30 ENCOUNTER — LAB (OUTPATIENT)
Dept: LAB | Facility: HOSPITAL | Age: 77
End: 2022-03-30

## 2022-03-30 DIAGNOSIS — E11.65 TYPE II DIABETES MELLITUS WITH HYPEROSMOLARITY, UNCONTROLLED: ICD-10-CM

## 2022-03-30 DIAGNOSIS — E11.00 TYPE II DIABETES MELLITUS WITH HYPEROSMOLARITY, UNCONTROLLED: ICD-10-CM

## 2022-03-30 DIAGNOSIS — E78.2 MIXED HYPERLIPIDEMIA: ICD-10-CM

## 2022-03-30 DIAGNOSIS — I10 ESSENTIAL HYPERTENSION, MALIGNANT: ICD-10-CM

## 2022-03-30 LAB
ALBUMIN SERPL-MCNC: 4.8 G/DL (ref 3.5–5.2)
ALBUMIN UR-MCNC: <1.2 MG/DL
ALBUMIN/GLOB SERPL: 1.8 G/DL
ALP SERPL-CCNC: 85 U/L (ref 39–117)
ALT SERPL W P-5'-P-CCNC: 35 U/L (ref 1–41)
ANION GAP SERPL CALCULATED.3IONS-SCNC: 11.2 MMOL/L (ref 5–15)
AST SERPL-CCNC: 21 U/L (ref 1–40)
BASOPHILS # BLD AUTO: 0.04 10*3/MM3 (ref 0–0.2)
BASOPHILS NFR BLD AUTO: 0.5 % (ref 0–1.5)
BILIRUB SERPL-MCNC: 0.2 MG/DL (ref 0–1.2)
BUN SERPL-MCNC: 20 MG/DL (ref 8–23)
BUN/CREAT SERPL: 23.5 (ref 7–25)
CALCIUM SPEC-SCNC: 10 MG/DL (ref 8.6–10.5)
CHLORIDE SERPL-SCNC: 102 MMOL/L (ref 98–107)
CHOLEST SERPL-MCNC: 137 MG/DL (ref 0–200)
CO2 SERPL-SCNC: 25.8 MMOL/L (ref 22–29)
CREAT SERPL-MCNC: 0.85 MG/DL (ref 0.76–1.27)
DEPRECATED RDW RBC AUTO: 44.6 FL (ref 37–54)
EGFRCR SERPLBLD CKD-EPI 2021: 90.6 ML/MIN/1.73
EOSINOPHIL # BLD AUTO: 0.16 10*3/MM3 (ref 0–0.4)
EOSINOPHIL NFR BLD AUTO: 2.2 % (ref 0.3–6.2)
ERYTHROCYTE [DISTWIDTH] IN BLOOD BY AUTOMATED COUNT: 12.6 % (ref 12.3–15.4)
GLOBULIN UR ELPH-MCNC: 2.6 GM/DL
GLUCOSE SERPL-MCNC: 130 MG/DL (ref 65–99)
HCT VFR BLD AUTO: 44.8 % (ref 37.5–51)
HDLC SERPL-MCNC: 47 MG/DL (ref 40–60)
HGB BLD-MCNC: 15 G/DL (ref 13–17.7)
IMM GRANULOCYTES # BLD AUTO: 0.09 10*3/MM3 (ref 0–0.05)
IMM GRANULOCYTES NFR BLD AUTO: 1.2 % (ref 0–0.5)
LDLC SERPL CALC-MCNC: 76 MG/DL (ref 0–100)
LDLC/HDLC SERPL: 1.62 {RATIO}
LYMPHOCYTES # BLD AUTO: 2.59 10*3/MM3 (ref 0.7–3.1)
LYMPHOCYTES NFR BLD AUTO: 35 % (ref 19.6–45.3)
MCH RBC QN AUTO: 32.3 PG (ref 26.6–33)
MCHC RBC AUTO-ENTMCNC: 33.5 G/DL (ref 31.5–35.7)
MCV RBC AUTO: 96.3 FL (ref 79–97)
MONOCYTES # BLD AUTO: 0.73 10*3/MM3 (ref 0.1–0.9)
MONOCYTES NFR BLD AUTO: 9.9 % (ref 5–12)
NEUTROPHILS NFR BLD AUTO: 3.79 10*3/MM3 (ref 1.7–7)
NEUTROPHILS NFR BLD AUTO: 51.2 % (ref 42.7–76)
NRBC BLD AUTO-RTO: 0 /100 WBC (ref 0–0.2)
PLATELET # BLD AUTO: 248 10*3/MM3 (ref 140–450)
PMV BLD AUTO: 11.3 FL (ref 6–12)
POTASSIUM SERPL-SCNC: 4.4 MMOL/L (ref 3.5–5.2)
PROT SERPL-MCNC: 7.4 G/DL (ref 6–8.5)
RBC # BLD AUTO: 4.65 10*6/MM3 (ref 4.14–5.8)
SODIUM SERPL-SCNC: 139 MMOL/L (ref 136–145)
TRIGL SERPL-MCNC: 70 MG/DL (ref 0–150)
TSH SERPL DL<=0.05 MIU/L-ACNC: 2.68 UIU/ML (ref 0.27–4.2)
VLDLC SERPL-MCNC: 14 MG/DL (ref 5–40)
WBC NRBC COR # BLD: 7.4 10*3/MM3 (ref 3.4–10.8)

## 2022-03-30 PROCEDURE — 80061 LIPID PANEL: CPT

## 2022-03-30 PROCEDURE — 83036 HEMOGLOBIN GLYCOSYLATED A1C: CPT

## 2022-03-30 PROCEDURE — 36415 COLL VENOUS BLD VENIPUNCTURE: CPT

## 2022-03-30 PROCEDURE — 80053 COMPREHEN METABOLIC PANEL: CPT

## 2022-03-30 PROCEDURE — 85025 COMPLETE CBC W/AUTO DIFF WBC: CPT

## 2022-03-30 PROCEDURE — 82043 UR ALBUMIN QUANTITATIVE: CPT

## 2022-03-30 PROCEDURE — 84443 ASSAY THYROID STIM HORMONE: CPT

## 2022-03-31 LAB — HBA1C MFR BLD: 6 % (ref 4.8–5.6)

## 2022-05-01 PROBLEM — Z80.42 FAMILY HISTORY OF PROSTATE CANCER: Status: ACTIVE | Noted: 2022-05-01

## 2022-05-01 PROBLEM — N40.1 BENIGN PROSTATIC HYPERPLASIA WITH URINARY FREQUENCY: Status: ACTIVE | Noted: 2022-05-01

## 2022-05-01 PROBLEM — R35.0 BENIGN PROSTATIC HYPERPLASIA WITH URINARY FREQUENCY: Status: ACTIVE | Noted: 2022-05-01

## 2022-05-01 NOTE — PROGRESS NOTES
Chief Complaint    Urologic complaint    Hina Aggarwal presents to [facility]  History of Present Illness    76-year-old gentleman      BPH   family history of prostate cancer    slow stream.  Little worse over the last year or 2.  Nocturia X 1..  No urgency or frequency.  No incontinence.    Does take him a while to void    On Flomax 0.4 mg and finasteride 5 mg daily.    Patient has a  shunt    3/22 0.8,   GFR 90    No cardiopulmonary history.  Patient does not smoke.  ASA 81    PVR     5/22     000   6/20    007     previous     No history of kidney stone.    Prostate cancer diagnosed around 61 in his brother    Has never had any urologic surgery.    Men in his family do not live to be old, mom lived to 97.    PSA       - Dr. Castillo    4/21  0.32  4/19 1.0        Results for orders placed or performed in visit on 05/03/22   Bladder Scan   Result Value Ref Range    Volume 0ml    POC Urinalysis Dipstick, Automated    Specimen: Urine   Result Value Ref Range    Color Yellow Yellow, Straw, Dark Yellow, Lorrie    Clarity, UA Clear Clear    Specific Gravity  1.020 1.005 - 1.030    pH, Urine 8.0 5.0 - 8.0    Leukocytes Negative Negative    Nitrite, UA Negative Negative    Protein, POC Negative Negative mg/dL    Glucose, UA Negative Negative, 1000 mg/dL (3+) mg/dL    Ketones, UA Negative Negative    Urobilinogen, UA Normal Normal    Bilirubin Negative Negative    Blood, UA Negative Negative    Lot Number [id number]     Expiration Date 2-2,023            Past History:  Medical History: has a past medical history of Arthritis, BPH (benign prostatic hyperplasia), Chronic allergic rhinitis, GERD (gastroesophageal reflux disease), Headache (06/22/2021), Hearing loss, Hypertension, Hypogonadism in male, Imbalance, Mood disorder (HCC), Otitis media, Prostate disorder, Seasonal allergies, and Vertigo.   Surgical History: has a past surgical history that includes Eye surgery; Cystoscopy;  Eye surgery; Other surgical history; and Tonsillectomy.   Family History: family history includes Cancer in his father; Diabetes in an other family member; Stroke in his mother.   Social History: reports that he quit smoking about 24 years ago. His smoking use included cigarettes. He has a 15.00 pack-year smoking history. He has never used smokeless tobacco. He reports current alcohol use. He reports that he does not use drugs.  Allergies: Patient has no known allergies.       Current Outpatient Medications:   •  acetaminophen (TYLENOL) 325 MG tablet, Take 650 mg by mouth., Disp: , Rfl:   •  amitriptyline (ELAVIL) 25 MG tablet, , Disp: , Rfl:   •  aspirin 81 MG chewable tablet, Chew 81 mg Daily., Disp: , Rfl:   •  atorvastatin (LIPITOR) 20 MG tablet, Take 20 mg by mouth Daily., Disp: , Rfl:   •  Breo Ellipta 100-25 MCG/INH inhaler, , Disp: , Rfl:   •  butalbital-acetaminophen-caffeine (FIORICET, ESGIC) -40 MG per tablet, , Disp: , Rfl:   •  cholecalciferol (Cholecalciferol) 25 MCG (1000 UT) tablet, Take 1,000 Units by mouth Daily., Disp: , Rfl:   •  Diclofenac Sodium (VOLTAREN) 1 % gel gel, Apply 4 g topically to the appropriate area as directed 4 (Four) Times a Day., Disp: 100 g, Rfl: 2  •  diphenoxylate-atropine (LOMOTIL) 2.5-0.025 MG per tablet, , Disp: , Rfl:   •  donepezil (ARICEPT) 10 MG tablet, Take 10 mg by mouth Daily., Disp: , Rfl:   •  finasteride (PROSCAR) 5 MG tablet, , Disp: , Rfl:   •  FLUoxetine (PROzac) 40 MG capsule, Take 40 mg by mouth Every Morning., Disp: , Rfl:   •  gabapentin (NEURONTIN) 300 MG capsule, , Disp: , Rfl:   •  glucose blood test strip, 1 strip by Other route 4 (Four) Times a Day., Disp: , Rfl:   •  HYDROcodone-acetaminophen (NORCO) 5-325 MG per tablet, , Disp: , Rfl:   •  lisinopril-hydrochlorothiazide (PRINZIDE,ZESTORETIC) 20-25 MG per tablet, Take 1 tablet by mouth Daily., Disp: , Rfl:   •  meclizine (ANTIVERT) 25 MG tablet, TAKE 1/2 TO 1 TABLET BY MOUTH THREE TIMES DAILY,  Disp: , Rfl:   •  memantine (NAMENDA) 5 MG tablet, , Disp: , Rfl:   •  metFORMIN (GLUCOPHAGE) 500 MG tablet, , Disp: , Rfl:   •  tamsulosin (FLOMAX) 0.4 MG capsule 24 hr capsule, , Disp: , Rfl:   •  triamcinolone (KENALOG) 0.1 % cream, triamcinolone acetonide 0.1 % topical cream apply a thin layer to the affected area(s) by topical route 2 times per day   Active, Disp: , Rfl:   •  triazolam (HALCION) 0.125 MG tablet, Take 0.125 mg by mouth., Disp: , Rfl:      Physical exam       Alert and orient x3  Well appearing, well developed, in no acute distress   Unlabored respirations      Grossly oriented to person, place and time, judgment is intact, normal mood and affect         Objective     Vital Signs:   There were no vitals taken for this visit.             Assessment and Plan    Diagnoses and all orders for this visit:    1. Benign prostatic hyperplasia with urinary frequency (Primary)    2. Family history of prostate cancer      BPH      Cont  Flomax 0.4 mg daily and  finasteride 5 mg daily.    Refilled today    We did discuss TURP today.  Risks and benefits were discussed including bleeding, infection and damage to the urinary system.  We also discussed the risk of anesthesia up to and including death.  Patient voiced understanding.  Patient like to hold off at this time.    Discussed 1% risk of incontinence and 5% risk of erectile dysfunction    He may call later this year to go ahead with surgery if things get worse.    Patient cannot do office cystoscopy, too anxious

## 2022-05-03 ENCOUNTER — OFFICE VISIT (OUTPATIENT)
Dept: UROLOGY | Facility: CLINIC | Age: 77
End: 2022-05-03

## 2022-05-03 VITALS — WEIGHT: 160 LBS | HEIGHT: 67 IN | BODY MASS INDEX: 25.11 KG/M2

## 2022-05-03 DIAGNOSIS — Z80.42 FAMILY HISTORY OF PROSTATE CANCER: ICD-10-CM

## 2022-05-03 DIAGNOSIS — N40.1 BENIGN PROSTATIC HYPERPLASIA WITH URINARY FREQUENCY: Primary | ICD-10-CM

## 2022-05-03 DIAGNOSIS — R35.0 BENIGN PROSTATIC HYPERPLASIA WITH URINARY FREQUENCY: Primary | ICD-10-CM

## 2022-05-03 LAB
BILIRUB BLD-MCNC: NEGATIVE MG/DL
CLARITY, POC: CLEAR
COLOR UR: YELLOW
EXPIRATION DATE: NORMAL
GLUCOSE UR STRIP-MCNC: NEGATIVE MG/DL
KETONES UR QL: NEGATIVE
LEUKOCYTE EST, POC: NEGATIVE
Lab: NORMAL
NITRITE UR-MCNC: NEGATIVE MG/ML
PH UR: 8 [PH] (ref 5–8)
PROT UR STRIP-MCNC: NEGATIVE MG/DL
RBC # UR STRIP: NEGATIVE /UL
SP GR UR: 1.02 (ref 1–1.03)
SPECIMEN VOL 24H UR: NORMAL L
UROBILINOGEN UR QL: NORMAL

## 2022-05-03 PROCEDURE — 99214 OFFICE O/P EST MOD 30 MIN: CPT | Performed by: UROLOGY

## 2022-05-03 PROCEDURE — 81003 URINALYSIS AUTO W/O SCOPE: CPT | Performed by: UROLOGY

## 2022-05-03 PROCEDURE — 51798 US URINE CAPACITY MEASURE: CPT | Performed by: UROLOGY

## 2022-05-03 RX ORDER — TAMSULOSIN HYDROCHLORIDE 0.4 MG/1
1 CAPSULE ORAL DAILY
Qty: 90 CAPSULE | Refills: 4 | Status: SHIPPED | OUTPATIENT
Start: 2022-05-03 | End: 2022-08-01

## 2022-05-03 RX ORDER — FINASTERIDE 5 MG/1
5 TABLET, FILM COATED ORAL DAILY
Qty: 90 TABLET | Refills: 4 | Status: SHIPPED | OUTPATIENT
Start: 2022-05-03 | End: 2022-08-01

## 2022-08-16 ENCOUNTER — HOSPITAL ENCOUNTER (OUTPATIENT)
Facility: HOSPITAL | Age: 77
Setting detail: OBSERVATION
LOS: 1 days | Discharge: HOME | End: 2022-08-18
Attending: EMERGENCY MEDICINE | Admitting: INTERNAL MEDICINE

## 2022-08-16 ENCOUNTER — APPOINTMENT (OUTPATIENT)
Dept: CT IMAGING | Facility: HOSPITAL | Age: 77
End: 2022-08-16

## 2022-08-16 ENCOUNTER — APPOINTMENT (OUTPATIENT)
Dept: GENERAL RADIOLOGY | Facility: HOSPITAL | Age: 77
End: 2022-08-16

## 2022-08-16 DIAGNOSIS — Z78.9 DECREASED ACTIVITIES OF DAILY LIVING (ADL): ICD-10-CM

## 2022-08-16 DIAGNOSIS — R13.12 DYSPHAGIA, OROPHARYNGEAL: ICD-10-CM

## 2022-08-16 DIAGNOSIS — G45.9 TIA (TRANSIENT ISCHEMIC ATTACK): Primary | ICD-10-CM

## 2022-08-16 DIAGNOSIS — R26.2 DIFFICULTY IN WALKING: ICD-10-CM

## 2022-08-16 LAB
ABO GROUP BLD: NORMAL
ABO GROUP BLD: NORMAL
ALBUMIN SERPL-MCNC: 4.3 G/DL (ref 3.5–5.2)
ALBUMIN/GLOB SERPL: 1.5 G/DL
ALP SERPL-CCNC: 112 U/L (ref 39–117)
ALT SERPL W P-5'-P-CCNC: 25 U/L (ref 1–41)
ANION GAP SERPL CALCULATED.3IONS-SCNC: 12.8 MMOL/L (ref 5–15)
AST SERPL-CCNC: 16 U/L (ref 1–40)
BASOPHILS # BLD AUTO: 0.05 10*3/MM3 (ref 0–0.2)
BASOPHILS NFR BLD AUTO: 0.5 % (ref 0–1.5)
BILIRUB SERPL-MCNC: 0.2 MG/DL (ref 0–1.2)
BLD GP AB SCN SERPL QL: NEGATIVE
BUN SERPL-MCNC: 26 MG/DL (ref 8–23)
BUN/CREAT SERPL: 25.2 (ref 7–25)
CALCIUM SPEC-SCNC: 9.9 MG/DL (ref 8.6–10.5)
CHLORIDE SERPL-SCNC: 100 MMOL/L (ref 98–107)
CO2 SERPL-SCNC: 26.2 MMOL/L (ref 22–29)
CREAT BLDA-MCNC: 1 MG/DL
CREAT SERPL-MCNC: 1.03 MG/DL (ref 0.76–1.27)
DEPRECATED RDW RBC AUTO: 46.1 FL (ref 37–54)
EGFRCR SERPLBLD CKD-EPI 2021: 75.8 ML/MIN/1.73
EGFRCR SERPLBLD CKD-EPI 2021: 78.5 ML/MIN/1.73
EOSINOPHIL # BLD AUTO: 0.15 10*3/MM3 (ref 0–0.4)
EOSINOPHIL NFR BLD AUTO: 1.5 % (ref 0.3–6.2)
ERYTHROCYTE [DISTWIDTH] IN BLOOD BY AUTOMATED COUNT: 13.3 % (ref 12.3–15.4)
GLOBULIN UR ELPH-MCNC: 2.8 GM/DL
GLUCOSE BLDC GLUCOMTR-MCNC: 266 MG/DL (ref 70–99)
GLUCOSE SERPL-MCNC: 258 MG/DL (ref 65–99)
HCT VFR BLD AUTO: 39.9 % (ref 37.5–51)
HGB BLD-MCNC: 13.7 G/DL (ref 13–17.7)
HOLD SPECIMEN: NORMAL
IMM GRANULOCYTES # BLD AUTO: 0.1 10*3/MM3 (ref 0–0.05)
IMM GRANULOCYTES NFR BLD AUTO: 1 % (ref 0–0.5)
INR PPP: 0.99 (ref 0.86–1.15)
LYMPHOCYTES # BLD AUTO: 1.98 10*3/MM3 (ref 0.7–3.1)
LYMPHOCYTES NFR BLD AUTO: 19.8 % (ref 19.6–45.3)
MCH RBC QN AUTO: 32.6 PG (ref 26.6–33)
MCHC RBC AUTO-ENTMCNC: 34.3 G/DL (ref 31.5–35.7)
MCV RBC AUTO: 95 FL (ref 79–97)
MONOCYTES # BLD AUTO: 0.91 10*3/MM3 (ref 0.1–0.9)
MONOCYTES NFR BLD AUTO: 9.1 % (ref 5–12)
NEUTROPHILS NFR BLD AUTO: 6.8 10*3/MM3 (ref 1.7–7)
NEUTROPHILS NFR BLD AUTO: 68.1 % (ref 42.7–76)
NRBC BLD AUTO-RTO: 0 /100 WBC (ref 0–0.2)
PLATELET # BLD AUTO: 237 10*3/MM3 (ref 140–450)
PMV BLD AUTO: 10.8 FL (ref 6–12)
POTASSIUM SERPL-SCNC: 3.8 MMOL/L (ref 3.5–5.2)
PROT SERPL-MCNC: 7.1 G/DL (ref 6–8.5)
PROTHROMBIN TIME: 13.2 SECONDS (ref 11.8–14.9)
RBC # BLD AUTO: 4.2 10*6/MM3 (ref 4.14–5.8)
RH BLD: POSITIVE
RH BLD: POSITIVE
SODIUM SERPL-SCNC: 139 MMOL/L (ref 136–145)
T&S EXPIRATION DATE: NORMAL
WBC NRBC COR # BLD: 9.99 10*3/MM3 (ref 3.4–10.8)
WHOLE BLOOD HOLD COAG: NORMAL
WHOLE BLOOD HOLD SPECIMEN: NORMAL

## 2022-08-16 PROCEDURE — 86901 BLOOD TYPING SEROLOGIC RH(D): CPT

## 2022-08-16 PROCEDURE — 71045 X-RAY EXAM CHEST 1 VIEW: CPT

## 2022-08-16 PROCEDURE — 99220 PR INITIAL OBSERVATION CARE/DAY 70 MINUTES: CPT | Performed by: FAMILY MEDICINE

## 2022-08-16 PROCEDURE — 86850 RBC ANTIBODY SCREEN: CPT | Performed by: EMERGENCY MEDICINE

## 2022-08-16 PROCEDURE — 86901 BLOOD TYPING SEROLOGIC RH(D): CPT | Performed by: EMERGENCY MEDICINE

## 2022-08-16 PROCEDURE — 70450 CT HEAD/BRAIN W/O DYE: CPT

## 2022-08-16 PROCEDURE — 85610 PROTHROMBIN TIME: CPT | Performed by: EMERGENCY MEDICINE

## 2022-08-16 PROCEDURE — 85025 COMPLETE CBC W/AUTO DIFF WBC: CPT | Performed by: EMERGENCY MEDICINE

## 2022-08-16 PROCEDURE — 93005 ELECTROCARDIOGRAM TRACING: CPT | Performed by: EMERGENCY MEDICINE

## 2022-08-16 PROCEDURE — G0378 HOSPITAL OBSERVATION PER HR: HCPCS

## 2022-08-16 PROCEDURE — 99284 EMERGENCY DEPT VISIT MOD MDM: CPT

## 2022-08-16 PROCEDURE — 86900 BLOOD TYPING SEROLOGIC ABO: CPT

## 2022-08-16 PROCEDURE — 80053 COMPREHEN METABOLIC PANEL: CPT | Performed by: EMERGENCY MEDICINE

## 2022-08-16 PROCEDURE — 86900 BLOOD TYPING SEROLOGIC ABO: CPT | Performed by: EMERGENCY MEDICINE

## 2022-08-16 PROCEDURE — 82565 ASSAY OF CREATININE: CPT

## 2022-08-16 PROCEDURE — 82962 GLUCOSE BLOOD TEST: CPT

## 2022-08-16 PROCEDURE — 93010 ELECTROCARDIOGRAM REPORT: CPT | Performed by: SPECIALIST

## 2022-08-16 RX ORDER — TAMSULOSIN HYDROCHLORIDE 0.4 MG/1
1 CAPSULE ORAL DAILY
COMMUNITY
End: 2022-10-28 | Stop reason: HOSPADM

## 2022-08-16 RX ORDER — SODIUM CHLORIDE 0.9 % (FLUSH) 0.9 %
10 SYRINGE (ML) INJECTION AS NEEDED
Status: DISCONTINUED | OUTPATIENT
Start: 2022-08-16 | End: 2022-08-18 | Stop reason: HOSPADM

## 2022-08-16 RX ORDER — FINASTERIDE 5 MG/1
5 TABLET, FILM COATED ORAL DAILY
COMMUNITY
End: 2022-10-28 | Stop reason: HOSPADM

## 2022-08-17 ENCOUNTER — APPOINTMENT (OUTPATIENT)
Dept: NEUROLOGY | Facility: HOSPITAL | Age: 77
End: 2022-08-17

## 2022-08-17 ENCOUNTER — APPOINTMENT (OUTPATIENT)
Dept: CT IMAGING | Facility: HOSPITAL | Age: 77
End: 2022-08-17

## 2022-08-17 ENCOUNTER — APPOINTMENT (OUTPATIENT)
Dept: MRI IMAGING | Facility: HOSPITAL | Age: 77
End: 2022-08-17

## 2022-08-17 PROBLEM — G45.9 TIA (TRANSIENT ISCHEMIC ATTACK): Status: ACTIVE | Noted: 2022-08-17

## 2022-08-17 LAB
ANION GAP SERPL CALCULATED.3IONS-SCNC: 12.5 MMOL/L (ref 5–15)
BASOPHILS # BLD AUTO: 0.05 10*3/MM3 (ref 0–0.2)
BASOPHILS NFR BLD AUTO: 0.5 % (ref 0–1.5)
BUN SERPL-MCNC: 25 MG/DL (ref 8–23)
BUN/CREAT SERPL: 30.9 (ref 7–25)
CALCIUM SPEC-SCNC: 9.4 MG/DL (ref 8.6–10.5)
CHLORIDE SERPL-SCNC: 103 MMOL/L (ref 98–107)
CHOLEST SERPL-MCNC: 104 MG/DL (ref 0–200)
CO2 SERPL-SCNC: 22.5 MMOL/L (ref 22–29)
CREAT SERPL-MCNC: 0.81 MG/DL (ref 0.76–1.27)
DEPRECATED RDW RBC AUTO: 44.7 FL (ref 37–54)
EGFRCR SERPLBLD CKD-EPI 2021: 91.9 ML/MIN/1.73
EOSINOPHIL # BLD AUTO: 0.14 10*3/MM3 (ref 0–0.4)
EOSINOPHIL NFR BLD AUTO: 1.3 % (ref 0.3–6.2)
ERYTHROCYTE [DISTWIDTH] IN BLOOD BY AUTOMATED COUNT: 13.2 % (ref 12.3–15.4)
GLUCOSE BLDC GLUCOMTR-MCNC: 136 MG/DL (ref 70–99)
GLUCOSE BLDC GLUCOMTR-MCNC: 94 MG/DL (ref 70–99)
GLUCOSE SERPL-MCNC: 118 MG/DL (ref 65–99)
HBA1C MFR BLD: 7 % (ref 4.8–5.6)
HCT VFR BLD AUTO: 36.4 % (ref 37.5–51)
HDLC SERPL-MCNC: 41 MG/DL (ref 40–60)
HGB BLD-MCNC: 12.5 G/DL (ref 13–17.7)
IMM GRANULOCYTES # BLD AUTO: 0.11 10*3/MM3 (ref 0–0.05)
IMM GRANULOCYTES NFR BLD AUTO: 1 % (ref 0–0.5)
LDLC SERPL CALC-MCNC: 51 MG/DL (ref 0–100)
LDLC/HDLC SERPL: 1.28 {RATIO}
LYMPHOCYTES # BLD AUTO: 2.55 10*3/MM3 (ref 0.7–3.1)
LYMPHOCYTES NFR BLD AUTO: 24.2 % (ref 19.6–45.3)
MCH RBC QN AUTO: 32.1 PG (ref 26.6–33)
MCHC RBC AUTO-ENTMCNC: 34.3 G/DL (ref 31.5–35.7)
MCV RBC AUTO: 93.3 FL (ref 79–97)
MONOCYTES # BLD AUTO: 1.18 10*3/MM3 (ref 0.1–0.9)
MONOCYTES NFR BLD AUTO: 11.2 % (ref 5–12)
NEUTROPHILS NFR BLD AUTO: 6.51 10*3/MM3 (ref 1.7–7)
NEUTROPHILS NFR BLD AUTO: 61.8 % (ref 42.7–76)
NRBC BLD AUTO-RTO: 0 /100 WBC (ref 0–0.2)
PLATELET # BLD AUTO: 207 10*3/MM3 (ref 140–450)
PMV BLD AUTO: 10.8 FL (ref 6–12)
POTASSIUM SERPL-SCNC: 3.8 MMOL/L (ref 3.5–5.2)
QT INTERVAL: 380 MS
RBC # BLD AUTO: 3.9 10*6/MM3 (ref 4.14–5.8)
SODIUM SERPL-SCNC: 138 MMOL/L (ref 136–145)
TRIGL SERPL-MCNC: 52 MG/DL (ref 0–150)
VLDLC SERPL-MCNC: 12 MG/DL (ref 5–40)
WBC NRBC COR # BLD: 10.54 10*3/MM3 (ref 3.4–10.8)

## 2022-08-17 PROCEDURE — G0378 HOSPITAL OBSERVATION PER HR: HCPCS

## 2022-08-17 PROCEDURE — 85025 COMPLETE CBC W/AUTO DIFF WBC: CPT | Performed by: FAMILY MEDICINE

## 2022-08-17 PROCEDURE — 80061 LIPID PANEL: CPT | Performed by: FAMILY MEDICINE

## 2022-08-17 PROCEDURE — 70496 CT ANGIOGRAPHY HEAD: CPT

## 2022-08-17 PROCEDURE — 94640 AIRWAY INHALATION TREATMENT: CPT

## 2022-08-17 PROCEDURE — 94664 DEMO&/EVAL PT USE INHALER: CPT

## 2022-08-17 PROCEDURE — 80048 BASIC METABOLIC PNL TOTAL CA: CPT | Performed by: FAMILY MEDICINE

## 2022-08-17 PROCEDURE — 0 IOPAMIDOL PER 1 ML: Performed by: INTERNAL MEDICINE

## 2022-08-17 PROCEDURE — 95816 EEG AWAKE AND DROWSY: CPT | Performed by: PSYCHIATRY & NEUROLOGY

## 2022-08-17 PROCEDURE — 82962 GLUCOSE BLOOD TEST: CPT

## 2022-08-17 PROCEDURE — 94799 UNLISTED PULMONARY SVC/PX: CPT

## 2022-08-17 PROCEDURE — 97161 PT EVAL LOW COMPLEX 20 MIN: CPT

## 2022-08-17 PROCEDURE — 92610 EVALUATE SWALLOWING FUNCTION: CPT

## 2022-08-17 PROCEDURE — 94760 N-INVAS EAR/PLS OXIMETRY 1: CPT

## 2022-08-17 PROCEDURE — 83036 HEMOGLOBIN GLYCOSYLATED A1C: CPT | Performed by: FAMILY MEDICINE

## 2022-08-17 PROCEDURE — 97165 OT EVAL LOW COMPLEX 30 MIN: CPT

## 2022-08-17 PROCEDURE — 99226 PR SBSQ OBSERVATION CARE/DAY 35 MINUTES: CPT | Performed by: INTERNAL MEDICINE

## 2022-08-17 PROCEDURE — 99204 OFFICE O/P NEW MOD 45 MIN: CPT | Performed by: PSYCHIATRY & NEUROLOGY

## 2022-08-17 PROCEDURE — 70498 CT ANGIOGRAPHY NECK: CPT

## 2022-08-17 PROCEDURE — 95816 EEG AWAKE AND DROWSY: CPT

## 2022-08-17 RX ORDER — POLYETHYLENE GLYCOL 3350 17 G/17G
17 POWDER, FOR SOLUTION ORAL DAILY PRN
Status: DISCONTINUED | OUTPATIENT
Start: 2022-08-17 | End: 2022-08-18 | Stop reason: HOSPADM

## 2022-08-17 RX ORDER — MEMANTINE HYDROCHLORIDE 10 MG/1
10 TABLET ORAL DAILY
Status: DISCONTINUED | OUTPATIENT
Start: 2022-08-17 | End: 2022-08-18 | Stop reason: HOSPADM

## 2022-08-17 RX ORDER — SODIUM CHLORIDE 9 MG/ML
40 INJECTION, SOLUTION INTRAVENOUS AS NEEDED
Status: DISCONTINUED | OUTPATIENT
Start: 2022-08-17 | End: 2022-08-18 | Stop reason: HOSPADM

## 2022-08-17 RX ORDER — ATORVASTATIN CALCIUM 40 MG/1
80 TABLET, FILM COATED ORAL NIGHTLY
Status: DISCONTINUED | OUTPATIENT
Start: 2022-08-17 | End: 2022-08-18 | Stop reason: HOSPADM

## 2022-08-17 RX ORDER — FINASTERIDE 5 MG/1
5 TABLET, FILM COATED ORAL DAILY
Status: DISCONTINUED | OUTPATIENT
Start: 2022-08-17 | End: 2022-08-18 | Stop reason: HOSPADM

## 2022-08-17 RX ORDER — MELATONIN
1000 DAILY
Status: DISCONTINUED | OUTPATIENT
Start: 2022-08-17 | End: 2022-08-18 | Stop reason: HOSPADM

## 2022-08-17 RX ORDER — TAMSULOSIN HYDROCHLORIDE 0.4 MG/1
0.4 CAPSULE ORAL DAILY
Status: DISCONTINUED | OUTPATIENT
Start: 2022-08-17 | End: 2022-08-18 | Stop reason: HOSPADM

## 2022-08-17 RX ORDER — FAMOTIDINE 20 MG/1
20 TABLET, FILM COATED ORAL NIGHTLY
Status: DISCONTINUED | OUTPATIENT
Start: 2022-08-17 | End: 2022-08-18 | Stop reason: HOSPADM

## 2022-08-17 RX ORDER — ACETAMINOPHEN 160 MG/5ML
650 SOLUTION ORAL EVERY 4 HOURS PRN
Status: DISCONTINUED | OUTPATIENT
Start: 2022-08-17 | End: 2022-08-18 | Stop reason: HOSPADM

## 2022-08-17 RX ORDER — AMITRIPTYLINE HYDROCHLORIDE 25 MG/1
25 TABLET, FILM COATED ORAL NIGHTLY
Status: DISCONTINUED | OUTPATIENT
Start: 2022-08-17 | End: 2022-08-18 | Stop reason: HOSPADM

## 2022-08-17 RX ORDER — FLUOXETINE HYDROCHLORIDE 20 MG/1
40 CAPSULE ORAL DAILY
Status: DISCONTINUED | OUTPATIENT
Start: 2022-08-17 | End: 2022-08-18 | Stop reason: HOSPADM

## 2022-08-17 RX ORDER — CLOPIDOGREL BISULFATE 75 MG/1
75 TABLET ORAL DAILY
Status: DISCONTINUED | OUTPATIENT
Start: 2022-08-17 | End: 2022-08-18 | Stop reason: HOSPADM

## 2022-08-17 RX ORDER — ARFORMOTEROL TARTRATE 15 UG/2ML
15 SOLUTION RESPIRATORY (INHALATION)
Status: DISCONTINUED | OUTPATIENT
Start: 2022-08-17 | End: 2022-08-18 | Stop reason: HOSPADM

## 2022-08-17 RX ORDER — BISACODYL 5 MG/1
5 TABLET, DELAYED RELEASE ORAL DAILY PRN
Status: DISCONTINUED | OUTPATIENT
Start: 2022-08-17 | End: 2022-08-18 | Stop reason: HOSPADM

## 2022-08-17 RX ORDER — ASPIRIN 81 MG/1
81 TABLET, CHEWABLE ORAL DAILY
Status: DISCONTINUED | OUTPATIENT
Start: 2022-08-18 | End: 2022-08-18 | Stop reason: HOSPADM

## 2022-08-17 RX ORDER — ONDANSETRON 2 MG/ML
4 INJECTION INTRAMUSCULAR; INTRAVENOUS EVERY 6 HOURS PRN
Status: DISCONTINUED | OUTPATIENT
Start: 2022-08-17 | End: 2022-08-18 | Stop reason: HOSPADM

## 2022-08-17 RX ORDER — GABAPENTIN 300 MG/1
300 CAPSULE ORAL NIGHTLY
Status: DISCONTINUED | OUTPATIENT
Start: 2022-08-17 | End: 2022-08-18 | Stop reason: HOSPADM

## 2022-08-17 RX ORDER — ASPIRIN 300 MG/1
300 SUPPOSITORY RECTAL ONCE
Status: COMPLETED | OUTPATIENT
Start: 2022-08-17 | End: 2022-08-17

## 2022-08-17 RX ORDER — ACETAMINOPHEN 650 MG/1
650 SUPPOSITORY RECTAL EVERY 4 HOURS PRN
Status: DISCONTINUED | OUTPATIENT
Start: 2022-08-17 | End: 2022-08-18 | Stop reason: HOSPADM

## 2022-08-17 RX ORDER — ASPIRIN 325 MG
325 TABLET ORAL ONCE
Status: COMPLETED | OUTPATIENT
Start: 2022-08-17 | End: 2022-08-17

## 2022-08-17 RX ORDER — IPRATROPIUM BROMIDE AND ALBUTEROL SULFATE 2.5; .5 MG/3ML; MG/3ML
3 SOLUTION RESPIRATORY (INHALATION)
Status: DISCONTINUED | OUTPATIENT
Start: 2022-08-17 | End: 2022-08-18 | Stop reason: HOSPADM

## 2022-08-17 RX ORDER — DEXTROSE MONOHYDRATE 25 G/50ML
25 INJECTION, SOLUTION INTRAVENOUS
Status: DISCONTINUED | OUTPATIENT
Start: 2022-08-17 | End: 2022-08-18 | Stop reason: HOSPADM

## 2022-08-17 RX ORDER — ASPIRIN 300 MG/1
300 SUPPOSITORY RECTAL DAILY
Status: DISCONTINUED | OUTPATIENT
Start: 2022-08-18 | End: 2022-08-18 | Stop reason: HOSPADM

## 2022-08-17 RX ORDER — CHOLECALCIFEROL (VITAMIN D3) 125 MCG
5 CAPSULE ORAL NIGHTLY PRN
Status: DISCONTINUED | OUTPATIENT
Start: 2022-08-17 | End: 2022-08-18 | Stop reason: HOSPADM

## 2022-08-17 RX ORDER — SODIUM CHLORIDE 0.9 % (FLUSH) 0.9 %
10 SYRINGE (ML) INJECTION AS NEEDED
Status: DISCONTINUED | OUTPATIENT
Start: 2022-08-17 | End: 2022-08-18 | Stop reason: HOSPADM

## 2022-08-17 RX ORDER — NICOTINE POLACRILEX 4 MG
15 LOZENGE BUCCAL
Status: DISCONTINUED | OUTPATIENT
Start: 2022-08-17 | End: 2022-08-18 | Stop reason: HOSPADM

## 2022-08-17 RX ORDER — SODIUM CHLORIDE 0.9 % (FLUSH) 0.9 %
10 SYRINGE (ML) INJECTION EVERY 12 HOURS SCHEDULED
Status: DISCONTINUED | OUTPATIENT
Start: 2022-08-17 | End: 2022-08-18 | Stop reason: HOSPADM

## 2022-08-17 RX ORDER — AMOXICILLIN 250 MG
2 CAPSULE ORAL 2 TIMES DAILY
Status: DISCONTINUED | OUTPATIENT
Start: 2022-08-17 | End: 2022-08-18 | Stop reason: HOSPADM

## 2022-08-17 RX ORDER — BISACODYL 10 MG
10 SUPPOSITORY, RECTAL RECTAL DAILY PRN
Status: DISCONTINUED | OUTPATIENT
Start: 2022-08-17 | End: 2022-08-18 | Stop reason: HOSPADM

## 2022-08-17 RX ORDER — BUTALBITAL, ACETAMINOPHEN AND CAFFEINE 300; 40; 50 MG/1; MG/1; MG/1
1 CAPSULE ORAL EVERY 6 HOURS PRN
Status: DISCONTINUED | OUTPATIENT
Start: 2022-08-17 | End: 2022-08-18 | Stop reason: HOSPADM

## 2022-08-17 RX ORDER — BUDESONIDE 0.5 MG/2ML
0.5 INHALANT ORAL
Status: DISCONTINUED | OUTPATIENT
Start: 2022-08-17 | End: 2022-08-18 | Stop reason: HOSPADM

## 2022-08-17 RX ORDER — ACETAMINOPHEN 325 MG/1
650 TABLET ORAL EVERY 4 HOURS PRN
Status: DISCONTINUED | OUTPATIENT
Start: 2022-08-17 | End: 2022-08-18 | Stop reason: HOSPADM

## 2022-08-17 RX ORDER — FLUOXETINE HYDROCHLORIDE 20 MG/1
40 CAPSULE ORAL DAILY
COMMUNITY
End: 2022-08-30

## 2022-08-17 RX ORDER — INSULIN LISPRO 100 [IU]/ML
0-7 INJECTION, SOLUTION INTRAVENOUS; SUBCUTANEOUS
Status: DISCONTINUED | OUTPATIENT
Start: 2022-08-17 | End: 2022-08-18 | Stop reason: HOSPADM

## 2022-08-17 RX ADMIN — BUDESONIDE 0.5 MG: 0.5 SUSPENSION RESPIRATORY (INHALATION) at 18:13

## 2022-08-17 RX ADMIN — ARFORMOTEROL TARTRATE 15 MCG: 15 SOLUTION RESPIRATORY (INHALATION) at 18:13

## 2022-08-17 RX ADMIN — ATORVASTATIN CALCIUM 80 MG: 40 TABLET, FILM COATED ORAL at 02:27

## 2022-08-17 RX ADMIN — MEMANTINE 10 MG: 10 TABLET ORAL at 10:54

## 2022-08-17 RX ADMIN — CLOPIDOGREL BISULFATE 75 MG: 75 TABLET ORAL at 10:54

## 2022-08-17 RX ADMIN — ATORVASTATIN CALCIUM 80 MG: 40 TABLET, FILM COATED ORAL at 20:26

## 2022-08-17 RX ADMIN — SENNOSIDES AND DOCUSATE SODIUM 2 TABLET: 50; 8.6 TABLET ORAL at 08:42

## 2022-08-17 RX ADMIN — Medication 10 ML: at 20:26

## 2022-08-17 RX ADMIN — IPRATROPIUM BROMIDE AND ALBUTEROL SULFATE 3 ML: 2.5; .5 SOLUTION RESPIRATORY (INHALATION) at 18:13

## 2022-08-17 RX ADMIN — ASPIRIN 325 MG ORAL TABLET 325 MG: 325 PILL ORAL at 02:27

## 2022-08-17 RX ADMIN — FINASTERIDE 5 MG: 5 TABLET, FILM COATED ORAL at 10:54

## 2022-08-17 RX ADMIN — IPRATROPIUM BROMIDE AND ALBUTEROL SULFATE 3 ML: 2.5; .5 SOLUTION RESPIRATORY (INHALATION) at 12:23

## 2022-08-17 RX ADMIN — Medication 10 ML: at 05:45

## 2022-08-17 RX ADMIN — DICLOFENAC SODIUM 4 G: 10 GEL TOPICAL at 12:39

## 2022-08-17 RX ADMIN — Medication 10 ML: at 08:41

## 2022-08-17 RX ADMIN — IOPAMIDOL 100 ML: 755 INJECTION, SOLUTION INTRAVENOUS at 17:21

## 2022-08-17 RX ADMIN — FAMOTIDINE 20 MG: 20 TABLET ORAL at 20:26

## 2022-08-17 RX ADMIN — BUDESONIDE 0.5 MG: 0.5 SUSPENSION RESPIRATORY (INHALATION) at 12:23

## 2022-08-17 RX ADMIN — ACETAMINOPHEN 325MG 650 MG: 325 TABLET ORAL at 20:26

## 2022-08-17 RX ADMIN — ARFORMOTEROL TARTRATE 15 MCG: 15 SOLUTION RESPIRATORY (INHALATION) at 12:23

## 2022-08-17 RX ADMIN — AMITRIPTYLINE HYDROCHLORIDE 25 MG: 25 TABLET, FILM COATED ORAL at 20:26

## 2022-08-17 RX ADMIN — Medication 1000 UNITS: at 10:54

## 2022-08-17 RX ADMIN — TAMSULOSIN HYDROCHLORIDE 0.4 MG: 0.4 CAPSULE ORAL at 10:54

## 2022-08-17 RX ADMIN — ACETAMINOPHEN 325MG 650 MG: 325 TABLET ORAL at 10:06

## 2022-08-17 RX ADMIN — FLUOXETINE 40 MG: 20 CAPSULE ORAL at 10:54

## 2022-08-17 RX ADMIN — GABAPENTIN 300 MG: 300 CAPSULE ORAL at 20:26

## 2022-08-17 NOTE — THERAPY EVALUATION
Patient Name: Jaxon Aggarwal  : 1945    MRN: [id number]                              Today's Date: 2022       Admit Date: 2022    Visit Dx:     ICD-10-CM ICD-9-CM   1. TIA (transient ischemic attack)  G45.9 435.9   2. Difficulty in walking  R26.2 719.7   3. Dysphagia, oropharyngeal  R13.12 787.22   4. Decreased activities of daily living (ADL)  Z78.9 V49.89     Patient Active Problem List   Diagnosis   • Benign prostatic hyperplasia with urinary frequency   • Family history of prostate cancer   • TIA (transient ischemic attack)     Past Medical History:   Diagnosis Date   • Arthritis    • BPH (benign prostatic hyperplasia)    • Chronic allergic rhinitis    • Diabetes mellitus (HCC)    • GERD (gastroesophageal reflux disease)    • Headache 2021   • Hearing loss    • Hyperlipidemia    • Hypertension    • Hypogonadism in male    • Imbalance    • Mood disorder (HCC)    • Otitis media    • Prostate disorder    • Seasonal allergies    • Vertigo      Past Surgical History:   Procedure Laterality Date   • CYSTOSCOPY     • EYE SURGERY      Cataract surgery   • EYE SURGERY      Eye implant, yes   • OTHER SURGICAL HISTORY      Hernia   • THYROID SURGERY     • TONSILLECTOMY        General Information     Row Name 22 1503          OT Time and Intention    Document Type evaluation  -ES     Mode of Treatment individual therapy;occupational therapy  -ES     Row Name 22 1504          General Information    Patient Profile Reviewed yes  -ES     Prior Level of Function independent:;ADL's;all household mobility;community mobility  Patient reports independent with B and IADLs at baseline. No device for mobility in home, uses cane for community mobility. Walk in shower w/ shower chair: seated shower completion. No home O2 use.  -ES     Existing Precautions/Restrictions fall  -ES     Barriers to Rehab none identified  -ES     Row Name 22 1500          Occupational Profile    Reason for  "Services/Referral (Occupational Profile) Patient is 76 yr old male admitted [facility] on [facility]/16/2022 with reports of AMS, slurred speech, confusion and mechanical fall at home. OT evaluation and treatment ordered d/t recent decline in ADLs/transfer ability. No previous OT services for current condition.  -ES     Row Name 08/17/22 1503          Living Environment    People in Home spouse  -ES     Row Name 08/17/22 1503          Home Main Entrance    Number of Stairs, Main Entrance other (see comments)  \" a few\"  -ES     Row Name 08/17/22 1503          Stairs Within Home, Primary    Stairs, Within Home, Primary patient has second level and basement, reports all needs on main level  -ES     Row Name 08/17/22 1503          Cognition    Orientation Status (Cognition) oriented x 3  patient pleasant and cooperative, agreeable to therapy evaluation  -ES           User Key  (r) = Recorded By, (t) = Taken By, (c) = Cosigned By    Initials Name Provider Type    ES Madhuri Pearson, OT Occupational Therapist                 Mobility/ADL's     Row Name 08/17/22 1510          Bed Mobility    Bed Mobility supine-sit;sit-supine  -ES     Supine-Sit [place] (Bed Mobility) supervision  -ES     Sit-Supine [place] (Bed Mobility) supervision  -ES     Row Name 08/17/22 1510          Transfers    Transfers sit-stand transfer;stand-sit transfer  -ES     Sit-Stand [place] (Transfers) supervision  -ES     Stand-Sit [place] (Transfers) supervision  -ES     Row Name 08/17/22 1510          Sit-Stand Transfer    Assistive Device (Sit-Stand Transfers) walker, front-wheeled  -ES     Row Name 08/17/22 1510          Stand-Sit Transfer    Assistive Device (Stand-Sit Transfers) walker, front-wheeled  -ES     Row Name 08/17/22 1510          Activities of Daily Living    BADL Assessment/Intervention bathing;upper body dressing;lower body dressing;grooming;feeding;toileting  -     Row Name 08/17/22 1510          Bathing " Assessment/Intervention    [place] Level (Bathing) bathing skills;set up  -ES     Row Name 08/17/22 1510          Upper Body Dressing Assessment/Training    [place] Level (Upper Body Dressing) upper body dressing skills;set up  -ES     Row Name 08/17/22 1510          Lower Body Dressing Assessment/Training    [place] Level (Lower Body Dressing) lower body dressing skills;set up  -ES     Row Name 08/17/22 1510          Grooming Assessment/Training    [place] Level (Grooming) grooming skills;set up  -ES     Row Name 08/17/22 1510          Self-Feeding Assessment/Training    [place] Level (Feeding) feeding skills;set up  -ES     Row Name 08/17/22 1510          Toileting Assessment/Training    [place] Level (Toileting) toileting skills;set up  -ES           User Key  (r) = Recorded By, (t) = Taken By, (c) = Cosigned By    Initials Name Provider Type    ES Madhuri Pearson OT Occupational Therapist               Obj/Interventions     Row Name 08/17/22 1513          Sensory Assessment (Somatosensory)    Sensory Assessment (Somatosensory) bilateral UE  -ES     Bilateral UE Sensory Assessment general sensation;intact  Bilateral sensation inatct to light and heavy touch. No sensory discrimination deficits noted  -ES     Row Name 08/17/22 1513          Vision Assessment/Intervention    Visual Impairment/Limitations WFL  -ES     Row Name 08/17/22 1513          Range of Motion Comprehensive    General Range of Motion no range of motion deficits identified;bilateral upper extremity ROM WNL  -ES     Row Name 08/17/22 1513          Strength Comprehensive (MMT)    General Manual Muscle Testing (MMT) Assessment no strength deficits identified  -ES     Comment, General Manual Muscle Testing (MMT) Assessment bilateral upper extremities assessed 4+/5 with symmetrical UE strength  -ES     Row Name 08/17/22 1513          Motor Skills    Motor Skills coordination;functional endurance  -ES     Coordination  WFL;bilateral;upper extremity  digit opposition testing normal bilaterally. Finger to nose normal bilaterally.  -ES     Functional Endurance good  -ES     Row Name 08/17/22 1513          Balance    Balance Assessment sitting dynamic balance;standing dynamic balance  -ES     Dynamic Sitting Balance independent  -ES     Dynamic Standing Balance supervision  -ES     Position/Device Used, Standing Balance supported;walker, front-wheeled  -ES           User Key  (r) = Recorded By, (t) = Taken By, (c) = Cosigned By    Initials Name Provider Type    Madhuri Mock, ADEBAYO Occupational Therapist               Goals/Plan    No documentation.                Clinical Impression     Row Name 08/17/22 1519          Plan of Care Review    Plan of Care Reviewed With patient  -ES     Progress no change  initial evaluation encounter  -ES     Outcome Evaluation Patient presents at or near functional baseline status with no functional deficits related to strength, safety awareness, transfers of mobility that impede patient independence with activities of daily living. Patient with no indicated need for skilled OT intervention in acute care setting. OT will sign off at this time.  -ES     Row Name 08/17/22 1519          Therapy Assessment/Plan (OT)    Criteria for Skilled Therapeutic Interventions Met (OT) no problems identified which require skilled intervention  -ES     Therapy Frequency (OT) evaluation only  -ES     Row Name 08/17/22 1519          Therapy Plan Review/Discharge Plan (OT)    Equipment Needs Upon Discharge (OT) --  Patient has rollator at home. Encouraged patient to utilize RWX at time of d/c for patient safety with household mobility  -ES     Anticipated Discharge Disposition (OT) home with assist  -ES     Row Name 08/17/22 1519          Vital Signs    O2 Delivery Pre Treatment room air  -ES     O2 Delivery Intra Treatment room air  -ES     O2 Delivery Post Treatment room air  -ES           User Key  (r) = Recorded By,  (t) = Taken By, (c) = Cosigned By    Initials Name Provider Type    ES Madhuri Pearson, ADEBAYO Occupational Therapist               Outcome Measures     Row Name 08/17/22 1524          How much help from another is currently needed...    Putting on and taking off regular lower body clothing? 4  -ES     Bathing (including washing, rinsing, and drying) 4  -ES     Toileting (which includes using toilet bed pan or urinal) 4  -ES     Putting on and taking off regular upper body clothing 4  -ES     Taking care of personal grooming (such as brushing teeth) 4  -ES     Eating meals 4  -ES     AM-PAC 6 Clicks Score (OT) 24  -ES     Row Name 08/17/22 1000 08/17/22 0750       How much help from another person do you currently need...    Turning from your back to your side while in flat bed without using bedrails? 4  -CONSUELO 4  -MS    Moving from lying on back to sitting on the side of a flat bed without bedrails? 4  -CONSUELO 4  -MS    Moving to and from a bed to a chair (including a wheelchair)? 3  -CONSUELO 4  -MS    Standing up from a chair using your arms (e.g., wheelchair, bedside chair)? 3  -CONSUELO 3  -MS    Climbing 3-5 steps with a railing? 3  -CONSUELO 3  -MS    To walk in hospital room? 3  -CONSUELO 3  -MS    AM-PAC 6 Clicks Score (PT) 20  -CONSUELO 21  -MS    Highest level of mobility 6 --> Walked 10 steps or more  -CONSUELO 6 --> Walked 10 steps or more  -MS    Row Name 08/17/22 1524 08/17/22 1000       Functional Assessment    Outcome Measure Options AM-PAC 6 Clicks Daily Activity (OT);Optimal Instrument  -ES AM-PAC 6 Clicks Basic Mobility (PT)  -CONSUELO    Row Name 08/17/22 1524          Optimal Instrument    Optimal Instrument Optimal - 3  -ES     Bending/Stooping 1  -ES     Standing 1  -ES     Reaching 1  -ES     From the list, choose the 3 activities you would most like to be able to do without any difficulty Bending/stooping;Standing;Reaching  -ES     Total Score Optimal - 3 3  -ES           User Key  (r) = Recorded By, (t) = Taken By, (c) = Cosigned By     Initials Name Provider Type    MS AranaSheri, RN Registered Nurse    Micha Gonzales, PT Physical Therapist    ES Madhuri Pearson OT Occupational Therapist                  OT Recommendation and Plan  Therapy Frequency (OT): evaluation only  Plan of Care Review  Plan of Care Reviewed With: patient  Progress: no change (initial evaluation encounter)  Outcome Evaluation: Patient presents at or near functional baseline status with no functional deficits related to strength, safety awareness, transfers of mobility that impede patient independence with activities of daily living. Patient with no indicated need for skilled OT intervention in acute care setting. OT will sign off at this time.     Time Calculation:    Time Calculation- OT     Row Name 08/17/22 1525             Time Calculation- OT    OT Received On 08/17/22  -ES              Untimed Charges    OT Eval/Re-eval Minutes 32  -ES              Total Minutes    Untimed Charges Total Minutes 32  -ES       Total Minutes 32  -ES            User Key  (r) = Recorded By, (t) = Taken By, (c) = Cosigned By    Initials Name Provider Type    Madhuri Mock OT Occupational Therapist              Therapy Charges for Today     Code Description Service Date Service Provider Modifiers Qty    34070097720 HC OT EVAL LOW COMPLEXITY 3 8/17/2022 Madhuri Pearson OT GO 1               Madhuri Pearson OT  8/17/2022

## 2022-08-17 NOTE — PROGRESS NOTES
[facility]   Hospitalist Progress Note  Date: 2022  Patient Name: Jaxon Aggarwal  : 1945  MRN: [id number]  Date of admission: 2022      Subjective   Subjective     Chief Complaint: Confusion, difficulty ambulating and slurred speech.    Summary:   Jaxon Aggarwal is a 76 y.o. male with a history of of diabetes, hypertension, and hyperlipidemia presents the hospital with acute altered mental status.  Patient reportedly had an episode earlier around 8:45 PM that the patient had slurred speech and confusion.  He was able to communicate that he needed to go to the bathroom so his wife tried to help him to the bathroom.  He had difficulty walking and ended up falling off the toilet.  His last known normal was around 8:45 PM and this episode occurred shortly after that.  They immediately called EMS and by the time he came to the emergency department the patient was back to his baseline with no deficits and no confusion.  Patient cannot get MRI because of  shunt for hydrocephalus.   shunt appears to be disconnected at right lung With a gap of a 4 mm.     interval Followup:  Vital signs stable on room air.  Patient is awake alert oriented x3.  Denies any speech or swallow problem.  No weakness in arms or legs.  No other complaints    Review of Systems   All systems were reviewed and negative except for: Summary and interval follow-up    Objective   Objective     Vitals:   Temp:  [97.7 °F (36.5 °C)-98.8 °F (37.1 °C)] 98.8 °F (37.1 °C)  Heart Rate:  [75-96] 89  Resp:  [12-19] 17  BP: (108-152)/(37-92) 139/62  Physical Exam    Constitutional: Awake, alert, no acute distress   Eyes: Pupils equal, sclerae anicteric, no conjunctival injection   HENT: NCAT, mucous membranes moist   Neck: Supple, no thyromegaly, no lymphadenopathy, trachea midline.  Right  shunt palpable   Respiratory: Clear to auscultation bilaterally, nonlabored respirations    Cardiovascular: RRR, no murmurs, rubs, or gallops,  palpable pedal pulses bilaterally   Gastrointestinal: Positive bowel sounds, soft, nontender, nondistended   Musculoskeletal: No bilateral ankle edema, no clubbing or cyanosis to extremities   Psychiatric: Appropriate affect, cooperative   Neurologic: Oriented x 3, strength symmetric in all extremities, Cranial Nerves grossly intact to confrontation, speech clear   Skin: No rashes     Result Review    Result Review:  I have personally reviewed the results from the time of this admission to 8/17/2022 17:02 EDT and agree with these findings:  [x]  Laboratory  [x]  Microbiology  [x]  Radiology  [x]  EKG/Telemetry normal sinus rhythm  []  Cardiology/Vascular   []  Pathology  [x]  Old records  [x]  Other: Medications    Assessment & Plan   Assessment / Plan     Assessment:  Slurred speech, weakness and confusion.  Resolved.  Likely TIA causing above.  NIDDM.  Hypertension.  Hyperlipidemia.  History of hydrocephalus.  S/p  shunt.  Appears to be broken.       Plan:  Discussed with neurology.  Appreciate input.  Await 2D echo.  Getting CTA of the head and neck.  Continue aspirin.  Plavix added.  Continue Lipitor.  Check hemoglobin A1c.  Sliding-scale insulin.  Await 2D echo.  Patient to follow-up as neurosurgeon about this continues right  shunt.  PT OT and speech therapy.  Continue telemetry.    Discussed plan with RN and .  Discharge home when cleared by neurology likely in a.m.    DVT prophylaxis:  Mechanical DVT prophylaxis orders are present.    CODE STATUS:   Code Status (Patient has no pulse and is not breathing): CPR (Attempt to Resuscitate)  Medical Interventions (Patient has pulse or is breathing): Full Support      Part of this note may be an electronic transcription/translation of spoken language to printed text using the Dragon Dictation System.     Electronically signed by Brian Garcia MD, 08/17/22, 5:02 PM EDT.

## 2022-08-17 NOTE — CONSULTS
[facility]   Neurology Consult Note    Patient Name: Jaxon Aggarwal  : 1945  MRN: [id number]  Primary Care Physician:  Max Castillo MD  Referring Physician: Rosalva Britton MD  Date of admission: 2022    Subjective   Subjective     Reason for Consult/ Chief Complaint: Dizziness, weakness    HPI:  Jaxon Aggarwal is a 76 y.o. male evaluated for dizziness and weakness.  Patient states that yesterday he was sitting down in his recliner and then he felt dizzy as things were spinning.  That lasted for at least over 30 minutes.  He states that he was not nauseated and there was no vomiting.  He told his wife that he did not feel well and about 10 minutes after that he walked to the car.  He did not pass out.  He states that he remembers everything but he closes eyes on the way to the emergency room.  He states that he had a hard time walking and his wife had to help him.  He did not have any focal neurologic deficits that he noted.  He told me that his wife noted him to have slurring of speech.  The history is different from what he told me from what the hospitalist wrote down.  It is noted there that he needed help to go to the bathroom and he fell off the toilet.  Specifically asked him this question head and he did not say anything about the toilet.  He states that he does not remember much.  It is also added that they called EMS.    He has had a shunt that is not MRI compatible according to nursing staff.  CT scan that was performed yesterday shows there has been interval placement of a right parietal approach ventriculostomy catheter with the tip of the body of the lateral ventricle.  There is a sizable size and configuration of ventriculomegaly.  No evidence of acute hydrocephalus.  Minimal chronic small vessel ischemic changes.    Had a carotid ultrasound in April of this year did not show any nephric and carotid stenosis.    Personal History     Past Medical History:   Diagnosis Date    • Arthritis    • BPH (benign prostatic hyperplasia)    • Chronic allergic rhinitis    • Diabetes mellitus (HCC)    • GERD (gastroesophageal reflux disease)    • Headache 06/22/2021   • Hearing loss    • Hyperlipidemia    • Hypertension    • Hypogonadism in male    • Imbalance    • Mood disorder (HCC)    • Otitis media    • Prostate disorder    • Seasonal allergies    • Vertigo        Past Surgical History:   Procedure Laterality Date   • CYSTOSCOPY     • EYE SURGERY      Cataract surgery   • EYE SURGERY      Eye implant, yes   • OTHER SURGICAL HISTORY      Hernia   • THYROID SURGERY     • TONSILLECTOMY         Family History: family history includes Cancer in his father; Diabetes in an other family member; Stroke in his mother. Otherwise pertinent FHx was reviewed and not pertinent to current issue.    Social History:  reports that he quit smoking about 25 years ago. His smoking use included cigarettes. He has a 15.00 pack-year smoking history. He has never used smokeless tobacco. He reports current alcohol use. He reports that he does not use drugs.    Home Medications:  Diclofenac Sodium, FLUoxetine, Fluticasone Furoate-Vilanterol, acetaminophen, amitriptyline, aspirin, atorvastatin, butalbital-acetaminophen-caffeine, cholecalciferol, diphenoxylate-atropine, finasteride, gabapentin, glucose blood, lisinopril-hydrochlorothiazide, memantine, metFORMIN, tamsulosin, and triamcinolone    Allergies:  No Known Allergies    Objective    Objective     Vitals:   Temp:  [97.7 °F (36.5 °C)-98.8 °F (37.1 °C)] 98.8 °F (37.1 °C)  Heart Rate:  [75-96] 89  Resp:  [12-19] 17  BP: (108-152)/(37-92) 139/62    Physical Exam: He is alert, fluent, phasic, follows commands well.  He is oriented to place and time.  He can tell me the president [place] and the previous president.  Visual fields full, able to follow directions gaze, facial strength is full, soft palate elevation and tongue are normal.  There is no weakness of the  upper or lower extremities.  Fine finger movements are intact.  Reflexes are absent.  Cerebellar testing is intact.  Station gait is able to get up from a sitting to standing position and able to ambulate a few feet with a walker.  He is able to back up without any difficulty and sit down.  Heart is regular rhythm normal in rate.      Result Review    Result Review:  I have personally reviewed the results from the time of this admission to 8/17/2022 13:34 EDT and agree with these findings:  [x]  Laboratory  []  Microbiology  [x]  Radiology  []  EKG/Telemetry   [x]  Cardiology/Vascular   []  Pathology  [x]  Old records  []  Other:      Assessment & Plan   Assessment / Plan   Active Hospital Problems:  Active Hospital Problems    Diagnosis    • TIA (transient ischemic attack)          Plan: I will order a CT angiogram of the head and neck as well as place him on aspirin and Plavix for the next 3 weeks.  This is most likely a TIA or he could have had a stroke however he cannot have an MRI.  I will also obtain an EEG.    Total time spent with the patient and coordinating patient care was 50 minutes.    electronically signed by Neeraj Bowers MD, 08/17/22, 1:34 PM EDT.

## 2022-08-17 NOTE — ED PROVIDER NOTES
Time: 10:00 PM EDT  Arrived by: private car  Chief Complaint:   Chief Complaint   Patient presents with   • Speech Problem   • Difficulty Walking   • Altered Mental Status     History provided by: patient and wife  History is limited by: N/A     History of Present Illness:    Patient is a 76 y.o. year old male that presents to the emergency department with a possible stroke. Wife states that around 8:45 pm tonight, the pt called for help and reported vision issues and needing to use the bathroom. Wife states that pt had slurred speech and confusion. Wife states that she took him to the bathroom and set him on the toilet and left briefly and he had fallen off the seat when she returned. Wife states that the last time she saw him acting normal was around 8:45 and the episode occurred shortly after that. Wife states that he took his normal medication tonight and that he has never experienced an episode like this. Pt denies current headache, breathing issues, and nausea, but states that he is tired.       History provided by:  Patient and spouse   used: No        Similar Symptoms Previously: no  Recently seen: N/A      Patient Care Team  Primary Care Provider: Max Castillo MD    Past Medical History:     No Known Allergies  Past Medical History:   Diagnosis Date   • Arthritis    • BPH (benign prostatic hyperplasia)    • Chronic allergic rhinitis    • Diabetes mellitus (HCC)    • GERD (gastroesophageal reflux disease)    • Headache 06/22/2021   • Hearing loss    • Hyperlipidemia    • Hypertension    • Hypogonadism in male    • Imbalance    • Mood disorder (HCC)    • Otitis media    • Prostate disorder    • Seasonal allergies    • Vertigo      Past Surgical History:   Procedure Laterality Date   • CYSTOSCOPY     • EYE SURGERY      Cataract surgery   • EYE SURGERY      Eye implant, yes   • OTHER SURGICAL HISTORY      Hernia   • THYROID SURGERY     • TONSILLECTOMY       Family History   Problem  Relation Age of Onset   • Stroke Mother    • Cancer Father    • Diabetes Other         Mellitus       Home Medications:  Prior to Admission medications    Medication Sig Start Date End Date Taking? Authorizing Provider   acetaminophen (TYLENOL) 325 MG tablet Take 650 mg by mouth.    Jorje Phillips MD   amitriptyline (ELAVIL) 25 MG tablet  11/2/21   Jorje Phillips MD   aspirin 81 MG chewable tablet Chew 81 mg Daily.    Jorje Phillips MD   atorvastatin (LIPITOR) 20 MG tablet Take 20 mg by mouth Daily. 4/6/21   Jorje Phillips MD   Breo Ellipta 100-25 MCG/INH inhaler  11/3/21   Jorje Phillips MD   butalbital-acetaminophen-caffeine (FIORICET, ESGIC) -40 MG per tablet  11/2/21   Jorje Phillips MD   cholecalciferol (VITAMIN D3) 25 MCG (1000 UT) tablet Take 1,000 Units by mouth Daily.    Jorje Phillips MD   Diclofenac Sodium (VOLTAREN) 1 % gel gel Apply 4 g topically to the appropriate area as directed 4 (Four) Times a Day. 8/5/21   Cruzito Adan MD   diphenoxylate-atropine (LOMOTIL) 2.5-0.025 MG per tablet  7/6/21   Jorje Phillips MD   FLUoxetine (PROzac) 40 MG capsule Take 40 mg by mouth Every Morning. 5/28/21   Jorje Phillips MD   gabapentin (NEURONTIN) 300 MG capsule  11/1/21   Jorje Phillips MD   glucose blood test strip 1 strip by Other route 4 (Four) Times a Day. 11/16/21   Jorje Phillips MD   lisinopril-hydrochlorothiazide (PRINZIDE,ZESTORETIC) 20-25 MG per tablet Take 1 tablet by mouth Daily. 5/28/21   Jorje Phillips MD   meclizine (ANTIVERT) 25 MG tablet TAKE 1/2 TO 1 TABLET BY MOUTH THREE TIMES DAILY 3/24/21   Jorje Phillips MD   memantine (NAMENDA) 5 MG tablet  11/2/21   Jorje Phillips MD   metFORMIN (GLUCOPHAGE) 500 MG tablet  5/13/21   Jorje Phillips MD   triamcinolone (KENALOG) 0.1 % cream triamcinolone acetonide 0.1 % topical cream apply a thin layer to the affected area(s) by topical route 2  "times per day   Active    Provider, MD Jorje        Social History:   Social History     Tobacco Use   • Smoking status: Former Smoker     Packs/day: 1.00     Years: 15.00     Pack years: 15.00     Types: Cigarettes     Quit date: 1997     Years since quittin.2   • Smokeless tobacco: Never Used   • Tobacco comment: smoked 21-30 years, quit smoking at age 51, smoked 10 cigaretts per day   Vaping Use   • Vaping Use: Never used   Substance Use Topics   • Alcohol use: Yes     Alcohol/week: 0.0 standard drinks     Comment: drinks monthly beer   • Drug use: Never     Recent travel: not applicable     Review of Systems:  Review of Systems   Constitutional: Positive for fatigue. Negative for chills and fever.   HENT: Negative for congestion, ear pain and sore throat.    Eyes: Positive for visual disturbance. Negative for pain.   Respiratory: Negative for cough, chest tightness and shortness of breath.    Cardiovascular: Negative for chest pain.   Gastrointestinal: Negative for abdominal pain, diarrhea, nausea and vomiting.   Genitourinary: Negative for flank pain and hematuria.   Musculoskeletal: Negative for joint swelling.   Skin: Negative for pallor.   Neurological: Positive for speech difficulty and headaches (resolved). Negative for seizures.   Psychiatric/Behavioral: Positive for confusion.        Physical Exam:  /64   Pulse 96   Temp 98.4 °F (36.9 °C) (Oral)   Resp 18   Ht 170.2 cm (67\")   Wt 70.5 kg (155 lb 6.8 oz)   SpO2 97%   BMI 24.34 kg/m²     Physical Exam  Vitals and nursing note reviewed.   Constitutional:       General: He is not in acute distress.     Appearance: Normal appearance. He is not toxic-appearing.   HENT:      Head: Normocephalic and atraumatic.      Mouth/Throat:      Mouth: Mucous membranes are moist.   Eyes:      General: No visual field deficit or scleral icterus.  Cardiovascular:      Rate and Rhythm: Normal rate and regular rhythm.      Pulses: Normal pulses.     "  Heart sounds: Normal heart sounds.   Pulmonary:      Effort: Pulmonary effort is normal. No respiratory distress.      Breath sounds: Normal breath sounds.   Abdominal:      General: Abdomen is flat.      Palpations: Abdomen is soft.      Tenderness: There is no abdominal tenderness.   Musculoskeletal:         General: Normal range of motion.      Cervical back: Normal range of motion and neck supple.   Skin:     General: Skin is warm and dry.   Neurological:      Mental Status: He is alert and oriented to person, place, and time. Mental status is at baseline.      Cranial Nerves: No cranial nerve deficit, dysarthria or facial asymmetry.      Sensory: Sensation is intact. No sensory deficit.      Motor: Motor function is intact. No weakness.      Coordination: Coordination is intact.      Gait: Gait normal.                Medications in the Emergency Department:  Medications   sodium chloride 0.9 % flush 10 mL (has no administration in time range)   sodium chloride 0.9 % flush 10 mL (has no administration in time range)   sodium chloride 0.9 % flush 10 mL (has no administration in time range)   sodium chloride 0.9 % infusion 40 mL (has no administration in time range)   acetaminophen (TYLENOL) tablet 650 mg (has no administration in time range)     Or   acetaminophen (TYLENOL) 160 MG/5ML solution 650 mg (has no administration in time range)     Or   acetaminophen (TYLENOL) suppository 650 mg (has no administration in time range)   sennosides-docusate (PERICOLACE) 8.6-50 MG per tablet 2 tablet (has no administration in time range)     And   polyethylene glycol (MIRALAX) packet 17 g (has no administration in time range)     And   bisacodyl (DULCOLAX) EC tablet 5 mg (has no administration in time range)     And   bisacodyl (DULCOLAX) suppository 10 mg (has no administration in time range)   ondansetron (ZOFRAN) injection 4 mg (has no administration in time range)   atorvastatin (LIPITOR) tablet 80 mg (has no  administration in time range)   aspirin tablet 325 mg (has no administration in time range)     Or   aspirin suppository 300 mg (has no administration in time range)   aspirin chewable tablet 81 mg (has no administration in time range)     Or   aspirin suppository 300 mg (has no administration in time range)   melatonin tablet 5 mg (has no administration in time range)        Labs  Lab Results (last 24 hours)     Procedure Component Value Units Date/Time    POC Glucose Once [[id number]]  (Abnormal) Collected: 08/16/22 2150    Specimen: Blood Updated: 08/16/22 2151     Glucose 266 mg/dL      Comment: Serial Number: [id number]:  [id number]       CBC & Differential [[id number]]  (Abnormal) Collected: 08/16/22 2155    Specimen: Blood Updated: 08/16/22 2211    Narrative:      The following orders were created for panel order CBC & Differential.  Procedure                               Abnormality         Status                     ---------                               -----------         ------                     CBC Auto Differential[[id number]]        Abnormal            Final result                 Please view results for these tests on the individual orders.    Comprehensive Metabolic Panel [[id number]]  (Abnormal) Collected: 08/16/22 2155    Specimen: Blood Updated: 08/16/22 2228     Glucose 258 mg/dL      BUN 26 mg/dL      Creatinine 1.03 mg/dL      Sodium 139 mmol/L      Potassium 3.8 mmol/L      Chloride 100 mmol/L      CO2 26.2 mmol/L      Calcium 9.9 mg/dL      Total Protein 7.1 g/dL      Albumin 4.30 g/dL      ALT (SGPT) 25 U/L      AST (SGOT) 16 U/L      Alkaline Phosphatase 112 U/L      Total Bilirubin 0.2 mg/dL      Globulin 2.8 gm/dL      A/G Ratio 1.5 g/dL      BUN/Creatinine Ratio 25.2     Anion Gap 12.8 mmol/L      eGFR 75.8 mL/min/1.73      Comment: National Kidney Foundation and American Society of Nephrology (ASN) Task Force recommended calculation based on the Chronic Kidney Disease  Epidemiology Collaboration (CKD-EPI) equation refit without adjustment for race.       Narrative:      GFR Normal >60  Chronic Kidney Disease <60  Kidney Failure <15      Protime-INR [[id number]]  (Normal) Collected: 08/16/22 2155    Specimen: Blood Updated: 08/16/22 2219     Protime 13.2 Seconds      INR 0.99    Narrative:      Suggested Therapeutic Ranges For Oral Anticoagulant Therapy:  Level of Therapy                      INR Target Range  Standard Dose                            2.0-3.0  High Dose                                2.5-3.5  Patients not receiving anticoagulant  Therapy Normal Range                     0.86-1.15    CBC Auto Differential [[id number]]  (Abnormal) Collected: 08/16/22 2155    Specimen: Blood Updated: 08/16/22 2211     WBC 9.99 10*3/mm3      RBC 4.20 10*6/mm3      Hemoglobin 13.7 g/dL      Hematocrit 39.9 %      MCV 95.0 fL      MCH 32.6 pg      MCHC 34.3 g/dL      RDW 13.3 %      RDW-SD 46.1 fl      MPV 10.8 fL      Platelets 237 10*3/mm3      Neutrophil % 68.1 %      Lymphocyte % 19.8 %      Monocyte % 9.1 %      Eosinophil % 1.5 %      Basophil % 0.5 %      Immature Grans % 1.0 %      Neutrophils, Absolute 6.80 10*3/mm3      Lymphocytes, Absolute 1.98 10*3/mm3      Monocytes, Absolute 0.91 10*3/mm3      Eosinophils, Absolute 0.15 10*3/mm3      Basophils, Absolute 0.05 10*3/mm3      Immature Grans, Absolute 0.10 10*3/mm3      nRBC 0.0 /100 WBC     POC Creatinine [[id number]] Collected: 08/16/22 2156    Specimen: Blood Updated: 08/16/22 2213     Creatinine 1.00 mg/dL      Comment: Serial Number: [id number]:  [id number]        eGFR 78.5 mL/min/1.73            Imaging:  XR Chest 1 View    Result Date: 8/16/2022  PROCEDURE: XR CHEST 1 VW  COMPARISON: [facility], , CHEST AP/PA 1 VIEW, 7/23/2016, 20:48.  INDICATIONS: Stroke Protocol (Onset > 12 hrs)/stroke like symptoms  FINDINGS:  There is shunt catheter tubing traversing the right side of the chest.  There is a radiolucent  possibly discontinuous 4 mm segment in the catheter tubing projecting over the right lung apex.  There is mild stable interstitial disease in the left lung base.  No new lung opacity.  No pneumothorax, pleural effusion or focal airspace consolidation.  The heart size and pulmonary vasculature appear within normal limits.        1. 4 mm apparent discontinuous segment in the  shunt catheter tubing projecting over the right lung apex.  Comparison with prior imaging is recommended. 2. No acute cardiopulmonary abnormality.       AILYN DUNLAP MD       Electronically Signed and Approved By: AILYN DUNLAP MD on 8/16/2022 at 22:22             CT Head Without Contrast Stroke Protocol    Result Date: 8/16/2022  PROCEDURE: CT HEAD WO CONTRAST STROKE PROTOCOL  COMPARISON:  [facility], CT, HEAD W/O CONTRAST, 7/23/2016, 21:01. INDICATIONS: Stroke, follow up  DIZZINESS AND DIFFICULTY WALKING TONIGHT  PROTOCOL:   Standard imaging protocol performed    RADIATION:   DLP: 1018.2mGy*cm   MA and/or KV was adjusted to minimize radiation dose.     TECHNIQUE: After obtaining the patient's consent, CT images were obtained without non-ionic intravenous contrast material.  FINDINGS:  There is a new right parietal approach ventriculostomy catheter with the tip terminating in the body of the left lateral ventricle.  There is stable size and configuration of the lateral ventricles, which appear mildly and chronically enlarged.  There is a small tract of encephalomalacia around the catheter tubing.  There are no additional new areas of hypoattenuation.  There is minimal patchy periventricular white matter hypoattenuation as a chronic finding.  The remainder of the cortex appears intact.  There is no acute intracranial hemorrhage.  No mass effect or midline shift.  There is a small amount of calcium deposition in the central ralph.  Cerebellum is unremarkable.  No abnormal extra-axial collections.  There is interval lens  replacement surgery.  The paranasal sinuses and the mastoid air cells appear well-aerated.  Frontal sinuses are hypoplastic.  Calvarium is intact.  Visualized shunt catheter tubing appears intact.        1. Since 2016, there has been interval placement of a right parietal approach ventriculostomy catheter with tip in the body of the left lateral ventricle.  There is stable size and configuration of ventriculomegaly. 2. No evidence of acute hydrocephalus.  No acute intracranial abnormality. 3. Minimal chronic small vessel ischemic change. 4. Interval lens replacement surgery.     AILYN DUNLAP MD       Electronically Signed and Approved By: AILYN DUNLAP MD on 8/16/2022 at 22:20               Procedures:  Procedures    Progress  ED Course as of 08/17/22 0213   Tue Aug 16, 2022   2208 ECG 12 Lead  Normal sinus rhythm with rate of 79. QRS normal. MT interval normal. QTc interval is normal. No ST elevation or depression. Non specific T wave abnormalities. No previous available for comparison. This EKG was interpreted by me.    [LD]      ED Course User Index  [LD] Rosalva Britton MD                            Medical Decision Making:  MDM  Number of Diagnoses or Management Options  Diagnosis management comments: Patient presented to the emergency department for evaluation for mental status change.  According to significant other patient had problems with speech and was not making any sense.  This last for approximately 20 minutes.  She is uncertain if he had any other deficits.  Patient is currently back to baseline.  CT of his head showed no acute finding.  Labs show no significant abnormality.  Presentation concerning for possible TIA.  I discussed patient with hospitalist and he will be admitted for further care and work-up.       Amount and/or Complexity of Data Reviewed  Clinical lab tests: ordered and reviewed  Tests in the radiology section of CPT®: ordered and reviewed    Risk of Complications, Morbidity,  and/or Mortality  Presenting problems: moderate  Management options: moderate         Final diagnoses:   TIA (transient ischemic attack)        Disposition:  ED Disposition     ED Disposition   Decision to Admit    Condition   --    Comment   Level of Care: Telemetry [5]   Diagnosis: TIA (transient ischemic attack) [651799]   Admitting Physician: REJI NIXON [[id number]]   Attending Physician: REJI NIXON [[id number]]   Certification: I Certify That Inpatient Hospital Services Are Medically Necessary For Greater Than 2 Midnights               This medical record created using voice recognition software and may contain unintended errors.    Documentation assistance provided by Marion Katz acting as scribe for Rosalva Britton MD. Information recorded by the scribe was done at my direction and has been verified and validated by me.        Marion Katz  08/16/22 2206       Rosalva Britton MD  08/17/22 0214       Rosalva Britton MD  08/17/22 0215

## 2022-08-17 NOTE — PLAN OF CARE
Goal Outcome Evaluation:  Plan of Care Reviewed With: patient        Progress: no change (initial evaluation encounter)  Outcome Evaluation: Patient presents at or near functional baseline status with no functional deficits related to strength, safety awareness, transfers of mobility that impede patient independence with activities of daily living. Patient with no indicated need for skilled OT intervention in acute care setting. OT will sign off at this time.

## 2022-08-17 NOTE — PLAN OF CARE
Goal Outcome Evaluation:  Plan of Care Reviewed With: patient      Pt. Rested intermittently throughout shift.  Headache relieved with acetaminophen.  Wife at bedside.  VSS.  Progress: improving

## 2022-08-17 NOTE — PLAN OF CARE
Goal Outcome Evaluation:  Plan of Care Reviewed With: patient           Outcome Evaluation: Patient presents with decreased strength, transfers and ambulation.  Skilled physical therapy services will be required to address these mobility deficits.  Recommend follow-up with outpatient physical therapy services upon discharge from the hospital

## 2022-08-17 NOTE — THERAPY EVALUATION
Acute Care - Physical Therapy Initial Evaluation   [facility]     Patient Name: Jaxon Aggarwal  : 1945  MRN: [id number]  Today's Date: 2022     Admit date: 2022     Referring Physician: Brian Garcia MD     Surgery Date:* No surgery found *               Visit Dx:     ICD-10-CM ICD-9-CM   1. TIA (transient ischemic attack)  G45.9 435.9   2. Difficulty in walking  R26.2 719.7     Patient Active Problem List   Diagnosis   • Benign prostatic hyperplasia with urinary frequency   • Family history of prostate cancer   • TIA (transient ischemic attack)     Past Medical History:   Diagnosis Date   • Arthritis    • BPH (benign prostatic hyperplasia)    • Chronic allergic rhinitis    • Diabetes mellitus (HCC)    • GERD (gastroesophageal reflux disease)    • Headache 2021   • Hearing loss    • Hyperlipidemia    • Hypertension    • Hypogonadism in male    • Imbalance    • Mood disorder (HCC)    • Otitis media    • Prostate disorder    • Seasonal allergies    • Vertigo      Past Surgical History:   Procedure Laterality Date   • CYSTOSCOPY     • EYE SURGERY      Cataract surgery   • EYE SURGERY      Eye implant, yes   • OTHER SURGICAL HISTORY      Hernia   • THYROID SURGERY     • TONSILLECTOMY       PT Assessment (last 12 hours)     PT Evaluation and Treatment     Row Name 22 1000          Physical Therapy Time and Intention    Subjective Information no complaints  -CONSUELO     Document Type evaluation  -CONSUELO     Mode of Treatment individual therapy;physical therapy  -CONSUELO     Patient Effort good  -CONSUELO     Row Name 22 1000          General Information    Patient Observations alert;cooperative;agree to therapy  -CONSUELO     Prior Level of Function independent:;all household mobility;community mobility  -CONSUELO     Equipment Currently Used at Home none  has a RW  -CONSUELO     Existing Precautions/Restrictions fall  -CONSUELO     Barriers to Rehab none identified  -CONSUELO     Row Name 22 1000          Living Environment     Current Living Arrangements home  -OCNSUELO     People in Home spouse  -CONSUELO     Row Name 08/17/22 1000          Range of Motion (ROM)    Range of Motion ROM is [facility]  -CONSUELO     Row Name 08/17/22 1000          Strength (Manual Muscle Testing)    Strength (Manual Muscle Testing) strength is [facility]  -CONSUELO     Row Name 08/17/22 1000          Bed Mobility    Bed Mobility bed mobility (all) activities;supine-sit  -CONSUELO     All Activities, [place] (Bed Mobility) contact guard  -CONSUELO     Supine-Sit [place] (Bed Mobility) contact guard  -CONSUELO     Row Name 08/17/22 1000          Transfers    Transfers bed-chair transfer;sit-stand transfer  -CONSUELO     Bed-Chair [place] (Transfers) contact guard  -CONSUELO     Assistive Device (Bed-Chair Transfers) walker, front-wheeled  -CONSUELO     Sit-Stand [place] (Transfers) contact guard  -CONSUELO     Row Name 08/17/22 1000          Sit-Stand Transfer    Assistive Device (Sit-Stand Transfers) walker, front-wheeled  -CONSUELO     Row Name 08/17/22 1000          Gait/Stairs (Locomotion)    Gait/Stairs Locomotion gait/ambulation assistive device  -CONSUELO     [place] Level (Gait) contact guard  -CONSUELO     Assistive Device (Gait) walker, front-wheeled  -CONSUELO     Distance in Feet (Gait) 100  -CONSUELO     Row Name 08/17/22 1000          Safety Issues, Functional Mobility    Impairments Affecting Function (Mobility) balance;pain  -CONSUELO     Row Name 08/17/22 1000          Balance    Balance Assessment standing dynamic balance  -CONSUELO     Dynamic Standing Balance contact guard  -CONSUELO     Position/Device Used, Standing Balance walker, front-wheeled  -CONSUELO     Row Name 08/17/22 1000          Plan of Care Review    Plan of Care Reviewed With patient  -CONSUELO     Outcome Evaluation Patient presents with decreased strength, transfers and ambulation.  Skilled physical therapy services will be required to address these mobility deficits.  Recommend follow-up with outpatient physical therapy services upon discharge from the hospital  -CONSUELO     Row Name  08/17/22 1000          Therapy Assessment/Plan (PT)    Rehab Potential (PT) good, to achieve stated therapy goals  -CONSUELO     Criteria for Skilled Interventions Met (PT) skilled treatment is necessary  -CONSUELO     Therapy Frequency (PT) daily  -CONSUELO     Predicted Duration of Therapy Intervention (PT) 10 days  -CONSUELO     Problem List (PT) problems related to;balance;mobility;strength  -CONSUELO     Activity Limitations Related to Problem List (PT) unable to ambulate safely  -CONSUELO     Row Name 08/17/22 1000          PT Evaluation Complexity    History, PT Evaluation Complexity no personal factors and/or comorbidities  -CONSUELO     Examination of Body Systems (PT Eval Complexity) total of 4 or more elements  -CONSUELO     Clinical Presentation (PT Evaluation Complexity) stable  -CONSUELO     Clinical Decision Making (PT Evaluation Complexity) low complexity  -CONSUELO     Overall Complexity (PT Evaluation Complexity) low complexity  -CONSUELO     Row Name 08/17/22 1000          Therapy Plan Review/Discharge Plan (PT)    Therapy Plan Review (PT) evaluation/treatment results reviewed;participants voiced agreement with care plan;participants included;patient  -CONSUELO     Row Name 08/17/22 1000          Physical Therapy Goals    Transfer Goal Selection (PT) transfer, PT goal 1  -CONSUELO     Gait Training Goal Selection (PT) gait training, PT goal 1  -CONSUELO     Row Name 08/17/22 1000          Transfer Goal 1 (PT)    Activity/Assistive Device (Transfer Goal 1, PT) transfers, all  -CONSUELO     [place] Level/Cues Needed (Transfer Goal 1, PT) independent  -CONSUELO     Time Frame (Transfer Goal 1, PT) long term goal (LTG);10 days  -CONSUELO     Row Name 08/17/22 1000          Gait Training Goal 1 (PT)    Activity/Assistive Device (Gait Training Goal 1, PT) gait (walking locomotion);assistive device use;walker, rolling  -CONSUELO     [place] Level (Gait Training Goal 1, PT) independent  -CONSUELO     Distance (Gait Training Goal 1, PT) 300  -CONSUELO     Time Frame (Gait Training Goal 1, PT) long term goal  (LTG);10 days  -CONSUELO           User Key  (r) = Recorded By, (t) = Taken By, (c) = Cosigned By    Initials Name Provider Type    Micha Gonzales PT Physical Therapist                Physical Therapy Education                 Title: PT OT SLP Therapies (Done)     Topic: Physical Therapy (Done)     Point: Mobility training (Done)     Learning Progress Summary           Patient Acceptance, E,TB, VU by CONSUELO at 8/17/2022 1030                   Point: Precautions (Done)     Learning Progress Summary           Patient Acceptance, E,TB, VU by CONSUELO at 8/17/2022 1030                               User Key     Initials Effective Dates Name Provider Type Discipline    CONSUELO 06/03/21 -  Micha Major PT Physical Therapist PT              PT Recommendation and Plan  Anticipated Discharge Disposition (PT): home with outpatient therapy services  Planned Therapy Interventions (PT): balance training, bed mobility training, gait training, home exercise program, strengthening, stair training, transfer training  Therapy Frequency (PT): daily  Plan of Care Reviewed With: patient  Outcome Evaluation: Patient presents with decreased strength, transfers and ambulation.  Skilled physical therapy services will be required to address these mobility deficits.  Recommend follow-up with outpatient physical therapy services upon discharge from the hospital   Outcome Measures     Row Name 08/17/22 1000             How much help from another person do you currently need...    Turning from your back to your side while in flat bed without using bedrails? 4  -CONSUELO      Moving from lying on back to sitting on the side of a flat bed without bedrails? 4  -CONSUELO      Moving to and from a bed to a chair (including a wheelchair)? 3  -CONSUELO      Standing up from a chair using your arms (e.g., wheelchair, bedside chair)? 3  -CONSUELO      Climbing 3-5 steps with a railing? 3  -CONSUELO      To walk in hospital room? 3  -CONSUELO      AM-PAC 6 Clicks Score (PT) 20  -CONSUELO               Functional Assessment    Outcome Measure Options AM-PAC 6 Clicks Basic Mobility (PT)  -CONSUELO            User Key  (r) = Recorded By, (t) = Taken By, (c) = Cosigned By    Initials Name Provider Type    Micha Gonzales PT Physical Therapist                 Time Calculation:    PT Charges     Row Name 08/17/22 1026             Time Calculation    PT Received On 08/17/22  -CONSUELO      PT Goal Re-Cert Due Date 08/26/22  -CONSUELO              Untimed Charges    PT Eval/Re-eval Minutes 35  -CONSUELO              Total Minutes    Untimed Charges Total Minutes 35  -CONSUELO       Total Minutes 35  -CONSUELO            User Key  (r) = Recorded By, (t) = Taken By, (c) = Cosigned By    Initials Name Provider Type    Micha Gonzales PT Physical Therapist              Therapy Charges for Today     Code Description Service Date Service Provider Modifiers Qty    94152796308 HC PT EVAL LOW COMPLEXITY 3 8/17/2022 Micha Major PT GP 1          PT G-Codes  Outcome Measure Options: AM-PAC 6 Clicks Basic Mobility (PT)  AM-PAC 6 Clicks Score (PT): 20    Micha Major PT  8/17/2022

## 2022-08-17 NOTE — THERAPY EVALUATION
Acute Care - Speech Language Pathology   Swallow Initial Evaluation  [facility]     Patient Name: Jaxon Aggarwal  : 1945  MRN: [id number]  Today's Date: 2022               Admit Date: 2022    Visit Dx:     ICD-10-CM ICD-9-CM   1. TIA (transient ischemic attack)  G45.9 435.9   2. Difficulty in walking  R26.2 719.7   3. Dysphagia, oropharyngeal  R13.12 787.22     Patient Active Problem List   Diagnosis   • Benign prostatic hyperplasia with urinary frequency   • Family history of prostate cancer   • TIA (transient ischemic attack)     Past Medical History:   Diagnosis Date   • Arthritis    • BPH (benign prostatic hyperplasia)    • Chronic allergic rhinitis    • Diabetes mellitus (HCC)    • GERD (gastroesophageal reflux disease)    • Headache 2021   • Hearing loss    • Hyperlipidemia    • Hypertension    • Hypogonadism in male    • Imbalance    • Mood disorder (HCC)    • Otitis media    • Prostate disorder    • Seasonal allergies    • Vertigo      Past Surgical History:   Procedure Laterality Date   • CYSTOSCOPY     • EYE SURGERY      Cataract surgery   • EYE SURGERY      Eye implant, yes   • OTHER SURGICAL HISTORY      Hernia   • THYROID SURGERY     • TONSILLECTOMY       PAIN SCALE: None noted    PRECAUTIONS/CONTRAINDICATIONS: None noted    SUSPECTED ABUSE/NEGLECT/EXPLOITATION: None noted    SOCIAL/PSYCHOLOGICAL NEEDS/BARRIERS: None noted    PAST SOCIAL HISTORY: Lives at home    PRIOR FUNCTION: Independent    PATIENT GOALS/EXPECTATIONS: Did not report    HISTORY: This patient is a very pleasant 76-year-old admitted with altered mental status.  CT was negative for any acute findings.  History of hydrocephalus with  shunt placement in the past.    CURRENT DIET LEVEL: N.p.o.    OBJECTIVE:    TEST ADMINISTERED: Clinical dysphagia evaluation    COGNITION/SAFETY AWARENESS: Alert and oriented    BEHAVIORAL OBSERVATIONS: Pleasant cooperative    ORAL MOTOR EXAM: Lingual and labial strength and range of  motion within functional limits.    VOICE QUALITY: Within normal limits    REFLEX EXAM: Deferred    POSTURE: 90 degrees upright    FEEDING/SWALLOWING FUNCTION: Assessed with full range of consistencies with thin liquids from spoon cup and straw, purée consistency soft and hard solids    CLINICAL OBSERVATIONS: Oral stage is characterized by good bolus preparation and control.  Timely oral transit.  No oral residue noted after the swallow.  Pharyngeal phase appears timely with good laryngeal elevation per palpation.  No clinical signs or symptoms of aspiration are noted.  Vocal quality may clear to auscultation.  Denies globus sensation after completion of swallow.    DYSPHAGIA CRITERIA: N/A    FUNCTIONAL ASSESSMENT INSTRUMENT: Patient currently scored a level 7 of 7 on Functional Communication Measures for swallowing indicating a 0% limitation in function.    ASSESSMENT/ PLAN OF CARE:  Pt presents with with functional oropharyngeal swallow.  No clinical signs or symptoms of aspiration noted.  Vocal quality may clear to auscultation     PROBLEMS:  1.  N/A   LTG 1: N/A   STG 1a: N/A     REHAB POTENTIAL:  Pt has good rehab potential.  The following limitations may influence improvement/ length of tx medical status.    RECOMMENDATIONS:   1.   DIET: Regular diet-thin liquids    2.  POSITION: 90 degrees upright    3.  COMPENSATORY STRATEGIES: General swallow precautions    No further dysphagia therapy is indicated at this time.  Available for reconsult should patient's medical status warrant.    YES: Pt/responsible party agrees with plan of care and has been informed of all alternatives, risks and benefits.    EDUCATION  The patient has been educated in the following areas:   Dysphagia (Swallowing Impairment).     Anamika Marie, SLP  8/17/2022

## 2022-08-17 NOTE — H&P
[facility] HISTORY AND PHYSICAL  Date: 2022   Patient Name: Jaxon Aggarwal  : 1945  MRN: [id number]  Primary Care Physician:  Max Castillo MD  Date of admission: 2022    Subjective   Subjective     Chief Complaint: Speech problem    HPI:    Jaxon Aggarwal is a 76 y.o. male with a history of of diabetes, hypertension, and hyperlipidemia presents the hospital with acute altered mental status.  Patient reportedly had an episode earlier around 8:45 PM that the patient had slurred speech and confusion.  He was able to communicate that he needed to go to the bathroom so his wife tried to help him to the bathroom.  He had difficulty walking and ended up falling off the toilet.  His last known normal was around 8:45 PM and this episode occurred shortly after that.  They immediately called EMS and by the time he came to the emergency department the patient was back to his baseline with no deficits and no confusion.      Personal History     Past Medical History:  Past Medical History:   Diagnosis Date   • Arthritis    • BPH (benign prostatic hyperplasia)    • Chronic allergic rhinitis    • Diabetes mellitus (HCC)    • GERD (gastroesophageal reflux disease)    • Headache 2021   • Hearing loss    • Hyperlipidemia    • Hypertension    • Hypogonadism in male    • Imbalance    • Mood disorder (HCC)    • Otitis media    • Prostate disorder    • Seasonal allergies    • Vertigo          Past Surgical History:  Past Surgical History:   Procedure Laterality Date   • CYSTOSCOPY     • EYE SURGERY      Cataract surgery   • EYE SURGERY      Eye implant, yes   • OTHER SURGICAL HISTORY      Hernia   • THYROID SURGERY     • TONSILLECTOMY           Family History:   Family History   Problem Relation Age of Onset   • Stroke Mother    • Cancer Father    • Diabetes Other         Mellitus         Social History:   Social History     Tobacco Use   • Smoking status: Former Smoker     Packs/day:  1.00     Years: 15.00     Pack years: 15.00     Types: Cigarettes     Quit date: 1997     Years since quittin.2   • Smokeless tobacco: Never Used   • Tobacco comment: smoked 21-30 years, quit smoking at age 51, smoked 10 cigaretts per day   Vaping Use   • Vaping Use: Never used   Substance Use Topics   • Alcohol use: Yes     Alcohol/week: 0.0 standard drinks     Comment: drinks monthly beer   • Drug use: Never         Home Medications:  Diclofenac Sodium, FLUoxetine, Fluticasone Furoate-Vilanterol, acetaminophen, amitriptyline, aspirin, atorvastatin, butalbital-acetaminophen-caffeine, cholecalciferol, diphenoxylate-atropine, finasteride, gabapentin, glucose blood, lisinopril-hydrochlorothiazide, meclizine, memantine, metFORMIN, tamsulosin, and triamcinolone    Allergies:  No Known Allergies    Review of Systems   All systems were reviewed and negative except for: Noted above or in HPI    Objective   Objective     Vitals:   Temp:  [97.9 °F (36.6 °C)] 97.9 °F (36.6 °C)  Heart Rate:  [81-83] 81  Resp:  [18] 18  BP: (132)/(37) 132/37    Physical Exam    Constitutional: Awake, alert, no acute distress   Eyes: Pupils equal, sclerae anicteric, no conjunctival injection   HENT: NCAT, mucous membranes moist   Neck: Supple, no thyromegaly, no lymphadenopathy, trachea midline   Respiratory: Clear to auscultation bilaterally, nonlabored respirations    Cardiovascular: RRR, no murmurs, rubs, or gallops, palpable pedal pulses bilaterally   Gastrointestinal: Positive bowel sounds, soft, nontender, nondistended   Musculoskeletal: No bilateral ankle edema, no clubbing or cyanosis to extremities   Psychiatric: Appropriate affect, cooperative   Neurologic: Oriented x 3, strength symmetric in all extremities, Cranial Nerves grossly intact to confrontation, speech clear   Skin: No rashes         Assessment & Plan   Assessment / Plan     Assessment/Plan:   • Acute neurologic deficit of undetermined etiology.  Possible  cerebrovascular origin  • Diabetes mellitus  • Hypertension  • Hyperlipidemia    The patient will be admitted for further evaluation and treatment.    No hemorrhage on CT scan of the brain.    We will obtain an MRI of the brain to further evaluate this.    We will perform neuro checks and have speech therapy evaluate the patient for a swallowing and speech evaluation.    Physical therapy occupational therapy have also been consulted.    We'll optimize the patient's medical therapy with antiplatelet agents, statin and permissive hypertension.    We will obtain a lipid panel and evaluation of the carotids.    The patient will be placed in a monitored bed for evaluation of their cardiac rhythm.  DVT prophylaxis:  Mechanical DVT prophylaxis orders are present.    CODE STATUS:    Code Status (Patient has no pulse and is not breathing): CPR (Attempt to Resuscitate)  Medical Interventions (Patient has pulse or is breathing): Full Support      Admission Status:  I believe this patient meets inpatient status.    Electronically signed by Everett Greene DO, 08/17/22, 12:16 AM EDT.

## 2022-08-18 ENCOUNTER — APPOINTMENT (OUTPATIENT)
Dept: CARDIOLOGY | Facility: HOSPITAL | Age: 77
End: 2022-08-18

## 2022-08-18 VITALS
BODY MASS INDEX: 24.39 KG/M2 | DIASTOLIC BLOOD PRESSURE: 57 MMHG | WEIGHT: 155.42 LBS | HEIGHT: 67 IN | SYSTOLIC BLOOD PRESSURE: 153 MMHG | TEMPERATURE: 99 F | OXYGEN SATURATION: 95 % | HEART RATE: 85 BPM | RESPIRATION RATE: 16 BRPM

## 2022-08-18 PROBLEM — G45.9 TIA (TRANSIENT ISCHEMIC ATTACK): Status: RESOLVED | Noted: 2022-08-17 | Resolved: 2022-08-18

## 2022-08-18 LAB
BH CV ECHO MEAS - AO ROOT DIAM: 2.7 CM
BH CV ECHO MEAS - EF(MOD-BP): 65 %
BH CV ECHO MEAS - IVSD: 1.1 CM
BH CV ECHO MEAS - LA DIMENSION: 3.9 CM
BH CV ECHO MEAS - LAT PEAK E' VEL: 6 CM/SEC
BH CV ECHO MEAS - LVIDD: 3.7 CM
BH CV ECHO MEAS - LVIDS: 2.5 CM
BH CV ECHO MEAS - LVPWD: 1 CM
BH CV ECHO MEAS - MED PEAK E' VEL: 9 CM/SEC
BH CV ECHO MEAS - MV A MAX VEL: 84 CM/SEC
BH CV ECHO MEAS - MV DEC TIME: 228 MSEC
BH CV ECHO MEAS - MV E MAX VEL: 65 CM/SEC
BH CV ECHO MEAS - MV E/A: 0.8
BH CV ECHO MEASUREMENTS AVERAGE E/E' RATIO: 8.67
GLUCOSE BLDC GLUCOMTR-MCNC: 132 MG/DL (ref 70–99)
GLUCOSE BLDC GLUCOMTR-MCNC: 146 MG/DL (ref 70–99)
IVRT: 53 MSEC
LEFT ATRIUM VOLUME INDEX: 27 ML/M2
MAXIMAL PREDICTED HEART RATE: 145 BPM
STRESS TARGET HR: 123 BPM

## 2022-08-18 PROCEDURE — 99221 1ST HOSP IP/OBS SF/LOW 40: CPT | Performed by: INTERNAL MEDICINE

## 2022-08-18 PROCEDURE — G0378 HOSPITAL OBSERVATION PER HR: HCPCS

## 2022-08-18 PROCEDURE — 97116 GAIT TRAINING THERAPY: CPT

## 2022-08-18 PROCEDURE — 93306 TTE W/DOPPLER COMPLETE: CPT

## 2022-08-18 PROCEDURE — 94799 UNLISTED PULMONARY SVC/PX: CPT

## 2022-08-18 PROCEDURE — 82962 GLUCOSE BLOOD TEST: CPT

## 2022-08-18 PROCEDURE — 99217 PR OBSERVATION CARE DISCHARGE MANAGEMENT: CPT | Performed by: INTERNAL MEDICINE

## 2022-08-18 PROCEDURE — 93005 ELECTROCARDIOGRAM TRACING: CPT | Performed by: INTERNAL MEDICINE

## 2022-08-18 RX ORDER — FAMOTIDINE 20 MG/1
20 TABLET, FILM COATED ORAL NIGHTLY
Qty: 30 TABLET | Refills: 0 | Status: SHIPPED | OUTPATIENT
Start: 2022-08-18 | End: 2022-09-17

## 2022-08-18 RX ORDER — CLOPIDOGREL BISULFATE 75 MG/1
75 TABLET ORAL DAILY
Qty: 21 TABLET | Refills: 0 | Status: SHIPPED | OUTPATIENT
Start: 2022-08-19 | End: 2022-09-09

## 2022-08-18 RX ADMIN — ARFORMOTEROL TARTRATE 15 MCG: 15 SOLUTION RESPIRATORY (INHALATION) at 06:32

## 2022-08-18 RX ADMIN — CLOPIDOGREL BISULFATE 75 MG: 75 TABLET ORAL at 08:48

## 2022-08-18 RX ADMIN — Medication 10 ML: at 08:48

## 2022-08-18 RX ADMIN — TAMSULOSIN HYDROCHLORIDE 0.4 MG: 0.4 CAPSULE ORAL at 08:48

## 2022-08-18 RX ADMIN — IPRATROPIUM BROMIDE AND ALBUTEROL SULFATE 3 ML: 2.5; .5 SOLUTION RESPIRATORY (INHALATION) at 06:32

## 2022-08-18 RX ADMIN — SENNOSIDES AND DOCUSATE SODIUM 2 TABLET: 50; 8.6 TABLET ORAL at 08:48

## 2022-08-18 RX ADMIN — ASPIRIN 81 MG CHEWABLE TABLET 81 MG: 81 TABLET CHEWABLE at 08:48

## 2022-08-18 RX ADMIN — BUDESONIDE 0.5 MG: 0.5 SUSPENSION RESPIRATORY (INHALATION) at 06:32

## 2022-08-18 RX ADMIN — FINASTERIDE 5 MG: 5 TABLET, FILM COATED ORAL at 08:48

## 2022-08-18 RX ADMIN — Medication 1000 UNITS: at 08:48

## 2022-08-18 RX ADMIN — FLUOXETINE 40 MG: 20 CAPSULE ORAL at 08:48

## 2022-08-18 RX ADMIN — MEMANTINE 10 MG: 10 TABLET ORAL at 08:48

## 2022-08-18 NOTE — PLAN OF CARE
Goal Outcome Evaluation:  Plan of Care Reviewed With: patient   Pt. To discharge home.     Progress: improving

## 2022-08-18 NOTE — THERAPY TREATMENT NOTE
Acute Care - Physical Therapy Treatment Note  [facility]     Patient Name: Jaxon Aggarwal  : 1945  MRN: [id number]  Today's Date: 2022      Visit Dx:     ICD-10-CM ICD-9-CM   1. TIA (transient ischemic attack)  G45.9 435.9   2. Difficulty in walking  R26.2 719.7   3. Dysphagia, oropharyngeal  R13.12 787.22   4. Decreased activities of daily living (ADL)  Z78.9 V49.89     Patient Active Problem List   Diagnosis   • Benign prostatic hyperplasia with urinary frequency   • Family history of prostate cancer   • TIA (transient ischemic attack)     Past Medical History:   Diagnosis Date   • Arthritis    • BPH (benign prostatic hyperplasia)    • Chronic allergic rhinitis    • Diabetes mellitus (HCC)    • GERD (gastroesophageal reflux disease)    • Headache 2021   • Hearing loss    • Hyperlipidemia    • Hypertension    • Hypogonadism in male    • Imbalance    • Mood disorder (HCC)    • Otitis media    • Prostate disorder    • Seasonal allergies    • Vertigo      Past Surgical History:   Procedure Laterality Date   • CYSTOSCOPY     • EYE SURGERY      Cataract surgery   • EYE SURGERY      Eye implant, yes   • OTHER SURGICAL HISTORY      Hernia   • THYROID SURGERY     • TONSILLECTOMY       PT Assessment (last 12 hours)     PT Evaluation and Treatment     Row Name 22 1500          Physical Therapy Time and Intention    Document Type evaluation  -AV     Mode of Treatment individual therapy;physical therapy  -AV     Row Name 22 1500          Bed Mobility    Bed Mobility supine-sit-supine  -AV     Supine-Sit-Supine [place] (Bed Mobility) standby assist  -AV     Row Name 22 1500          Transfers    Transfers sit-stand transfer;stand-sit transfer  -AV     Sit-Stand [place] (Transfers) standby assist  -AV     Stand-Sit [place] (Transfers) standby assist  -AV     Row Name 22 1500          Sit-Stand Transfer    Assistive Device (Sit-Stand Transfers) walker, front-wheeled  -AV      Row Name 08/18/22 1500          Stand-Sit Transfer    Assistive Device (Stand-Sit Transfers) walker, front-wheeled  -AV     Row Name 08/18/22 1500          Gait/Stairs (Locomotion)    Gait/Stairs Locomotion gait/ambulation independence;gait/ambulation assistive device;distance ambulated  -AV     [place] Level (Gait) contact guard  -AV     Assistive Device (Gait) walker, front-wheeled  -AV     Distance in Feet (Gait) 150  -AV     Pattern (Gait) step-through  -AV     Comment, (Gait/Stairs) Patient required verbal cues to maintain safe proximity to assistive device.  -AV     Row Name 08/18/22 1500          Safety Issues, Functional Mobility    Safety Issues Affecting Function (Mobility) positioning of assistive device  -AV     Row Name 08/18/22 1500          Balance    Balance Assessment standing dynamic balance  -AV     Dynamic Standing Balance contact guard;verbal cues;1-person assist  -AV     Position/Device Used, Standing Balance supported;walker, front-wheeled  -AV     Row Name 08/18/22 1500          Progress Summary (PT)    Progress Toward Functional Goals (PT) progress toward functional goals is good  -AV           User Key  (r) = Recorded By, (t) = Taken By, (c) = Cosigned By    Initials Name Provider Type    Fabián Wise, REYNALDO Physical Therapist                Physical Therapy Education                 Title: PT OT SLP Therapies (Done)     Topic: Physical Therapy (Done)     Point: Mobility training (Done)     Learning Progress Summary           Patient Acceptance, E,TB, VU by CONSUELO at 8/17/2022 1030                   Point: Precautions (Done)     Learning Progress Summary           Patient Acceptance, E,TB, VU by CONSUELO at 8/17/2022 1030                               User Key     Initials Effective Dates Name Provider Type Padmini CORDERO 06/03/21 -  Micha Major, PT Physical Therapist PT              PT Recommendation and Plan     Progress Summary (PT)  Progress Toward Functional Goals (PT):  progress toward functional goals is good   Outcome Measures     Row Name 08/18/22 1500 08/17/22 1000          How much help from another person do you currently need...    Turning from your back to your side while in flat bed without using bedrails? 4  -AV 4  -CONSUELO     Moving from lying on back to sitting on the side of a flat bed without bedrails? 4  -AV 4  -CONSUELO     Moving to and from a bed to a chair (including a wheelchair)? 3  -AV 3  -CONSUELO     Standing up from a chair using your arms (e.g., wheelchair, bedside chair)? 3  -AV 3  -CONSUELO     Climbing 3-5 steps with a railing? 3  -AV 3  -CONSUELO     To walk in hospital room? 3  -AV 3  -CONSUELO     AM-PAC 6 Clicks Score (PT) 20  -AV 20  -CONSUELO            Functional Assessment    Outcome Measure Options AM-PAC 6 Clicks Basic Mobility (PT)  -AV AM-PAC 6 Clicks Basic Mobility (PT)  -CONSUELO           User Key  (r) = Recorded By, (t) = Taken By, (c) = Cosigned By    Initials Name Provider Type    Micha Gonzales, PT Physical Therapist    AV Fabián Norman, PT Physical Therapist                 Time Calculation:    PT Charges     Row Name 08/18/22 1510             Time Calculation    PT Received On 08/18/22  -AV              Timed Charges    17211 - Gait Training Minutes  6  -AV      74679 - PT Therapeutic Activity Minutes 2  -AV              Total Minutes    Timed Charges Total Minutes 8  -AV       Total Minutes 8  -AV            User Key  (r) = Recorded By, (t) = Taken By, (c) = Cosigned By    Initials Name Provider Type    AV Fabián Norman, REYNALDO Physical Therapist              Therapy Charges for Today     Code Description Service Date Service Provider Modifiers Qty    18042824239 HC GAIT TRAINING EA 15 MIN 8/18/2022 Fabián Norman, PT GP 1          PT G-Codes  Outcome Measure Options: AM-PAC 6 Clicks Basic Mobility (PT)  AM-PAC 6 Clicks Score (PT): 20  AM-PAC 6 Clicks Score (OT): 24    Fabián Norman PT  8/18/2022     Xray Thoracic/Lumbar Spine 1 View

## 2022-08-18 NOTE — CASE MANAGEMENT/SOCIAL WORK
Discharge Planning Assessment   [facility]     Patient Name: Jaxon Aggarwal  MRN: [id number]  Today's Date: 8/18/2022    Admit Date: 8/16/2022     Discharge Needs Assessment    No documentation.                Discharge Plan     Row Name 08/18/22 4430       Plan    Plan CM spoke with patient wife Stacey via phone. Patient admitted for TIA- unable to have MRI due to  Shunt- patient lives with spouse and plans to d/c home with spouse. Paitent demographics, PCP and pharmacy have been confirmed. A1c 7.00, LDL 51- patient will continue on aspirin and lipitor- plavix has been added. Patient will d/c home no needs- Wife has scheduled an apointment with Dr MATT Ward in [place], [place] on Saturday to assess shunt. No other needs noted.    Row Name 08/18/22 1503       Plan    Final Discharge Disposition Code 01 - home or self-care              Continued Care and Services - Admitted Since 8/16/2022    Coordination has not been started for this encounter.          Demographic Summary    No documentation.                Functional Status    No documentation.                Psychosocial    No documentation.                Abuse/Neglect    No documentation.                Legal    No documentation.                Substance Abuse    No documentation.                Patient Forms    No documentation.                   Faye Morales RN

## 2022-08-18 NOTE — DISCHARGE SUMMARY
[facility]        HOSPITALIST  DISCHARGE SUMMARY    Patient Name: Jaxon Aggarwal  : 1945  MRN: [id number]    Date of Admission: 2022  Date of Discharge:  2022  Primary Care Physician: Max Castillo MD    Consults     Date and Time Order Name Status Description    2022 12:53 PM Inpatient Cardiology Consult Completed     2022  9:51 AM Inpatient Neurology Consult Stroke      2022 11:21 PM Inpatient Hospitalist Consult            Active and Resolved Hospital Problems:  Active Hospital Problems   No active problems to display.      Resolved Hospital Problems    Diagnosis POA   • TIA (transient ischemic attack) [G45.9] Yes   Slurred speech, weakness and confusion.  Resolved.  Likely TIA causing above.  NIDDM.  Hypertension.  Hyperlipidemia.  History of hydrocephalus.  S/p  shunt.  Appears to be broken.    Hospital Course     Hospital Course:  Jaxon Aggarwal is a 76 y.o. male with a history of of diabetes, hypertension, and hyperlipidemia presents the hospital with acute altered mental status.  Patient reportedly had an episode earlier around 8:45 PM that the patient had slurred speech and confusion.  He was able to communicate that he needed to go to the bathroom so his wife tried to help him to the bathroom.  He had difficulty walking and ended up falling off the toilet.  His last known normal was around 8:45 PM and this episode occurred shortly after that.  They immediately called EMS and by the time he came to the emergency department the patient was back to his baseline with no deficits and no confusion.  Patient cannot get MRI because of  shunt for hydrocephalus.   shunt appears to be disconnected at right lung With a gap of a 4 mm on chest x-ray.  CTA of the neck did not show any dysfunction of ventriculostomy catheter.  Patient evaluated by neurology.  CTA of the head and neck did not show any significant occlusion.  EEG negative.  Cardiology  consulted for event monitor placement.  Patient and family wants to see their primary cardiologist Dr. MAXIMO Krishnan in [place] for this.      interval Followup:  Vital signs stable on room air.  Episode of nonsustained V. tach.  Asymptomatic  Patient is awake alert oriented x3.  Denies any speech or swallow problem.  No weakness in arms or legs.  No other complaints.  LDL of 51 and hemoglobin A1c of 7% all within target.        DISCHARGE Follow Up Recommendations for labs and diagnostics: Follow with PCP, neurology, cardiology and neurosurgery.  Consult primary cardiologist for placement of event monitor for a month.  Take Plavix for 3 weeks along with aspirin.      Day of Discharge     Vital Signs:  Temp:  [98.2 °F (36.8 °C)-99.1 °F (37.3 °C)] 99 °F (37.2 °C)  Heart Rate:  [69-91] 85  Resp:  [16-18] 16  BP: (135-154)/(50-69) 153/57    Physical Exam:    Constitutional: Awake, alert, no acute distress              Eyes: Pupils equal, sclerae anicteric, no conjunctival injection              HENT: NCAT, mucous membranes moist              Neck: Supple, no thyromegaly, no lymphadenopathy, trachea midline.  Right  shunt palpable              Respiratory: Clear to auscultation bilaterally, nonlabored respirations               Cardiovascular: RRR, no murmurs, rubs, or gallops, palpable pedal pulses bilaterally              Gastrointestinal: Positive bowel sounds, soft, nontender, nondistended              Musculoskeletal: No bilateral ankle edema, no clubbing or cyanosis to extremities              Psychiatric: Appropriate affect, cooperative              Neurologic: Oriented x 3, strength symmetric in all extremities, Cranial Nerves grossly intact to confrontation, speech clear              Skin: No rashes     Discharge Details        Discharge Medications      New Medications      Instructions Start Date   clopidogrel 75 MG tablet  Commonly known as: PLAVIX   75 mg, Oral, Daily   Start Date: August 19, 2022     famotidine  20 MG tablet  Commonly known as: PEPCID   20 mg, Oral, Nightly         Continue These Medications      Instructions Start Date   acetaminophen 325 MG tablet  Commonly known as: TYLENOL   650 mg, Oral, Every 6 Hours PRN      amitriptyline 25 MG tablet  Commonly known as: ELAVIL   25 mg, Nightly      aspirin 81 MG chewable tablet   81 mg, Oral, Daily      atorvastatin 20 MG tablet  Commonly known as: LIPITOR   20 mg, Oral, Nightly      Breo Ellipta 100-25 MCG/INH inhaler  Generic drug: Fluticasone Furoate-Vilanterol   1 puff, Inhalation, Daily - RT      butalbital-acetaminophen-caffeine -40 MG per tablet  Commonly known as: FIORICET, ESGIC   Every 4 Hours PRN      cholecalciferol 25 MCG (1000 UT) tablet  Commonly known as: VITAMIN D3   1,000 Units, Oral, Daily      Diclofenac Sodium 1 % gel gel  Commonly known as: VOLTAREN   4 g, Topical, 4 Times Daily      diphenoxylate-atropine 2.5-0.025 MG per tablet  Commonly known as: LOMOTIL   1 tablet, Oral, 3 Times Daily PRN      finasteride 5 MG tablet  Commonly known as: PROSCAR   5 mg, Oral, Daily      FLUoxetine 20 MG capsule  Commonly known as: PROzac   40 mg, Oral, Daily      gabapentin 300 MG capsule  Commonly known as: NEURONTIN   300 mg, Oral, Nightly      glucose blood test strip   1 strip, Other, 4 Times Daily      lisinopril-hydrochlorothiazide 20-25 MG per tablet  Commonly known as: PRINZIDE,ZESTORETIC   0.5 tablets, Oral, Daily      memantine 5 MG tablet  Commonly known as: NAMENDA   10 mg, Oral, Daily      metFORMIN 500 MG tablet  Commonly known as: GLUCOPHAGE   1,000 mg, Oral, 2 Times Daily With Meals      tamsulosin 0.4 MG capsule 24 hr capsule  Commonly known as: FLOMAX   1 capsule, Oral, Daily      triamcinolone 0.1 % cream  Commonly known as: KENALOG   1 application, 2 Times Daily PRN             No Known Allergies    Discharge Disposition:  Home or Self Care.  In private vehicle with wife    Diet:  Diet Instructions     Diet: Consistent Carbohydrate,  Cardiac      Discharge Diet:  Consistent Carbohydrate  Cardiac             Discharge Activity:   Activity Instructions     Activity as Tolerated            CODE STATUS:  Code Status and Medical Interventions:   Ordered at: 08/17/22 0015     Code Status (Patient has no pulse and is not breathing):    CPR (Attempt to Resuscitate)     Medical Interventions (Patient has pulse or is breathing):    Full Support         Future Appointments   Date Time Provider Department Center   8/30/2022 12:00 PM Donald Martinez MD [facility]   5/5/2023  1:30 PM Donald Martinez MD [facility]       Additional Instructions for the Follow-ups that You Need to Schedule     Discharge Follow-up with PCP   As directed       Currently Documented PCP:    Max Castillo MD    PCP Phone Number:    [phone]     Follow Up Details: 2 week         Discharge Follow-up with Specialty: Neurosurgery   As directed      Specialty: Neurosurgery    Follow Up Details: To check  shunt         Discharge Follow-up with Specified Provider: Dr. MAXIMO Krishnan; 1 Week   As directed      To: Dr. MXAIMO Krishnan    Follow Up: 1 Week    Follow Up Details: Event monitor placement for a month for TIA         Discharge Follow-up with Specified Provider: Dr. Bowers; 3 Weeks   As directed      To: Dr. Bowers    Follow Up: 3 Weeks               Pertinent  and/or Most Recent Results     PROCEDURES:   * Cannot find OR case *     LAB RESULTS:      Lab 08/17/22 0428 08/16/22 2155   WBC 10.54 9.99   HEMOGLOBIN 12.5* 13.7   HEMATOCRIT 36.4* 39.9   PLATELETS 207 237   NEUTROS ABS 6.51 6.80   IMMATURE GRANS (ABS) 0.11* 0.10*   LYMPHS ABS 2.55 1.98   MONOS ABS 1.18* 0.91*   EOS ABS 0.14 0.15   MCV 93.3 95.0   PROTIME  --  13.2         Lab 08/17/22 0428 08/16/22 2156 08/16/22 2155   SODIUM 138  --  139   POTASSIUM 3.8  --  3.8   CHLORIDE 103  --  100   CO2 22.5  --  26.2   ANION GAP 12.5  --  12.8   BUN 25*  --  26*   CREATININE 0.81 1.00 1.03   EGFR 91.9 78.5  75.8   GLUCOSE 118*  --  258*   CALCIUM 9.4  --  9.9   HEMOGLOBIN A1C 7.00*  --   --          Lab 08/16/22 2155   TOTAL PROTEIN 7.1   ALBUMIN 4.30   GLOBULIN 2.8   ALT (SGPT) 25   AST (SGOT) 16   BILIRUBIN 0.2   ALK PHOS 112         Lab 08/16/22 2155   PROTIME 13.2   INR 0.99         Lab 08/17/22  0428   CHOLESTEROL 104   LDL CHOL 51   HDL CHOL 41   TRIGLYCERIDES 52         Lab 08/16/22  2241 08/16/22 2155   ABO TYPING O O   RH TYPING Positive Positive   ANTIBODY SCREEN  --  Negative         Brief Urine Lab Results  (Last result in the past 365 days)      Color   Clarity   Blood   Leuk Est   Nitrite   Protein   CREAT   Urine HCG        05/03/22 1322 Yellow   Clear   Negative   Negative   Negative   Negative               Microbiology Results (last 10 days)     ** No results found for the last 240 hours. **          CT Angiogram Neck    Result Date: 8/18/2022  Impression:   1. Mild atherosclerotic disease at the carotid bifurcations without evidence of carotid stenosis. 2. Mild narrowing at the origin of the non dominant right vertebral artery. 3. Stable right parietal approach ventriculostomy catheter and stable ventriculomegaly without evidence of acute hydrocephalus.     AILYN DUNLAP MD       Electronically Signed and Approved By: AILYN DUNLAP MD on 8/18/2022 at 0:41             XR Chest 1 View    Result Date: 8/16/2022  Impression:   1. 4 mm apparent discontinuous segment in the  shunt catheter tubing projecting over the right lung apex.  Comparison with prior imaging is recommended. 2. No acute cardiopulmonary abnormality.       AILYN DUNLAP MD       Electronically Signed and Approved By: AILYN DUNLAP MD on 8/16/2022 at 22:22             CT Angiogram Head    Result Date: 8/18/2022  Impression:   1. Mild atherosclerotic disease at the carotid bifurcations without evidence of carotid stenosis. 2. Mild narrowing at the origin of the non dominant right vertebral artery. 3. Stable right parietal approach  ventriculostomy catheter and stable ventriculomegaly without evidence of acute hydrocephalus.     AILYN DUNLAP MD       Electronically Signed and Approved By: AILYN DUNLAP MD on 8/18/2022 at 0:41             CT Head Without Contrast Stroke Protocol    Result Date: 8/16/2022  Impression:   1. Since 2016, there has been interval placement of a right parietal approach ventriculostomy catheter with tip in the body of the left lateral ventricle.  There is stable size and configuration of ventriculomegaly. 2. No evidence of acute hydrocephalus.  No acute intracranial abnormality. 3. Minimal chronic small vessel ischemic change. 4. Interval lens replacement surgery.     AILYN DUNLAP MD       Electronically Signed and Approved By: AILYN DUNLAP MD on 8/16/2022 at 22:20                       Results for orders placed during the hospital encounter of 08/16/22    Adult Transthoracic Echo Complete W/ Cont if Necessary Per Protocol (With Agitated Saline)    Interpretation Summary  · Calculated left ventricular EF = 65% Estimated left ventricular EF was in agreement with the calculated left ventricular EF. Left ventricular systolic function is normal.  · Left ventricular diastolic function is consistent with (grade I) impaired relaxation.  · Mild mitral valve regurgitation is present      Imaging Results (All)     Procedure Component Value Units Date/Time    CT Angiogram Head [[id number]] Collected: 08/18/22 0041     Updated: 08/18/22 0045    Narrative:      PROCEDURE: CT ANGIOGRAM NECK, 8/17/2022, 17:19  CT ANGIOGRAM HEAD, 8/17/2022, 17:19     COMPARISON: [facility], CT, CT HEAD WO CONTRAST STROKE PROTOCOL, 8/16/2022, 21:59.     INDICATIONS: Stroke, follow up     PROTOCOL:   Standard imaging protocol performed      RADIATION:   DLP: 1140mGy*cm    Automated exposure control was utilized to minimize radiation dose.   CONTRAST: 100cc Isovue 370 I.V.     TECHNIQUE: After obtaining the patient's consent, CT images  of the neck were obtained without and   with non-ionic intravenous contrast material. Multi-planar reformatted/3-D images were created to   optimize visualization of vascular anatomy. Unless otherwise stated in this report, all vascular   stenoses involving the internal carotid arteries reported for this examination are derived by   dividing the lesion diameter by the diameter of the normal internal carotid artery more distally.     FINDINGS:   CT angiography:  There is minimal aortic atherosclerosis.  There is common origin of the right   brachiocephalic and left common carotid artery.  Bilateral subclavian and right brachiocephalic   arteries appear widely patent.     Right carotid:  The right CCA follows a normal course and appears normal caliber.  There is mild   mixed plaque at the carotid bifurcation extending into the proximal ICA.  There is no significant   stenosis.  The cervical ICA follows a normal course and appears normal caliber throughout the neck   and into the head.  There is no definite posterior communicating artery.  No definite anterior   communicating artery.  The A1 and M1 segments and distal CHRIST and MCA branches appear patent.     Left carotid:  The left CCA follows a normal course and appears normal caliber.  There is mild   mixed plaque at the carotid bifurcation.  No ICA stenosis.  The mid and distal cervical ICA appears   within normal limits.  The intracranial ICA segments are widely patent.  There is no definite   posterior communicating artery.  There is a small left A1 segment.  The left M1 segment appears   normal.  Distal CHRIST and MCA branches appear patent.     Posterior circulation:  There is slight left vertebral artery dominance.  The vertebral arteries   arise as expected from ipsilateral subclavian arteries.  There is mild narrowing at the origin of   the right vertebral artery.  The vertebral arteries follow a normal course and appear normal   caliber throughout the neck and  into the head.  V4 segments are patent.  The basilar artery is   normal caliber.  Bilateral superior cerebellar and posterior cerebral arteries appear widely   patent.     Nonvascular findings:  There is a right parietal approach ventriculostomy catheter with tip   crossing the midline and terminating in the posterior body of the left lateral ventricle.  There is   stable ventriculomegaly.  There is a small focus of encephalomalacia adjacent to the   ventriculostomy catheter.  There are no acute intracranial findings.  Stable thinning of the   orbital lenses.  The mastoids are underpneumatized.  The paranasal sinuses appear well-aerated.    There is no evidence of a neck mass or adenopathy.  The thyroid and salivary glands appear   symmetric.  No pharyngeal mucosal space mass.  The larynx and subglottic airway appear within   normal limits.  The lung apices are clear.  Superior mediastinal structures are unremarkable.    There are no acute osseous abnormalities or destructive bone lesions.  There are mild/moderate   cervical degenerative changes.       Impression:         1. Mild atherosclerotic disease at the carotid bifurcations without evidence of carotid stenosis.  2. Mild narrowing at the origin of the non dominant right vertebral artery.  3. Stable right parietal approach ventriculostomy catheter and stable ventriculomegaly without   evidence of acute hydrocephalus.            AILYN DUNLAP MD         Electronically Signed and Approved By: AILYN DUNLAP MD on 8/18/2022 at 0:41                     CT Angiogram Neck [[id number]] Collected: 08/18/22 0041     Updated: 08/18/22 0045    Narrative:      PROCEDURE: CT ANGIOGRAM NECK, 8/17/2022, 17:19  CT ANGIOGRAM HEAD, 8/17/2022, 17:19     COMPARISON: [facility], CT, CT HEAD WO CONTRAST STROKE PROTOCOL, 8/16/2022, 21:59.     INDICATIONS: Stroke, follow up     PROTOCOL:   Standard imaging protocol performed      RADIATION:   DLP: 1140mGy*cm    Automated  exposure control was utilized to minimize radiation dose.   CONTRAST: 100cc Isovue 370 I.V.     TECHNIQUE: After obtaining the patient's consent, CT images of the neck were obtained without and   with non-ionic intravenous contrast material. Multi-planar reformatted/3-D images were created to   optimize visualization of vascular anatomy. Unless otherwise stated in this report, all vascular   stenoses involving the internal carotid arteries reported for this examination are derived by   dividing the lesion diameter by the diameter of the normal internal carotid artery more distally.     FINDINGS:   CT angiography:  There is minimal aortic atherosclerosis.  There is common origin of the right   brachiocephalic and left common carotid artery.  Bilateral subclavian and right brachiocephalic   arteries appear widely patent.     Right carotid:  The right CCA follows a normal course and appears normal caliber.  There is mild   mixed plaque at the carotid bifurcation extending into the proximal ICA.  There is no significant   stenosis.  The cervical ICA follows a normal course and appears normal caliber throughout the neck   and into the head.  There is no definite posterior communicating artery.  No definite anterior   communicating artery.  The A1 and M1 segments and distal CHRIST and MCA branches appear patent.     Left carotid:  The left CCA follows a normal course and appears normal caliber.  There is mild   mixed plaque at the carotid bifurcation.  No ICA stenosis.  The mid and distal cervical ICA appears   within normal limits.  The intracranial ICA segments are widely patent.  There is no definite   posterior communicating artery.  There is a small left A1 segment.  The left M1 segment appears   normal.  Distal CHRIST and MCA branches appear patent.     Posterior circulation:  There is slight left vertebral artery dominance.  The vertebral arteries   arise as expected from ipsilateral subclavian arteries.  There is mild  narrowing at the origin of   the right vertebral artery.  The vertebral arteries follow a normal course and appear normal   caliber throughout the neck and into the head.  V4 segments are patent.  The basilar artery is   normal caliber.  Bilateral superior cerebellar and posterior cerebral arteries appear widely   patent.     Nonvascular findings:  There is a right parietal approach ventriculostomy catheter with tip   crossing the midline and terminating in the posterior body of the left lateral ventricle.  There is   stable ventriculomegaly.  There is a small focus of encephalomalacia adjacent to the   ventriculostomy catheter.  There are no acute intracranial findings.  Stable thinning of the   orbital lenses.  The mastoids are underpneumatized.  The paranasal sinuses appear well-aerated.    There is no evidence of a neck mass or adenopathy.  The thyroid and salivary glands appear   symmetric.  No pharyngeal mucosal space mass.  The larynx and subglottic airway appear within   normal limits.  The lung apices are clear.  Superior mediastinal structures are unremarkable.    There are no acute osseous abnormalities or destructive bone lesions.  There are mild/moderate   cervical degenerative changes.       Impression:         1. Mild atherosclerotic disease at the carotid bifurcations without evidence of carotid stenosis.  2. Mild narrowing at the origin of the non dominant right vertebral artery.  3. Stable right parietal approach ventriculostomy catheter and stable ventriculomegaly without   evidence of acute hydrocephalus.            AILYN DUNLAP MD         Electronically Signed and Approved By: AILYN DUNLAP MD on 8/18/2022 at 0:41                     XR Chest 1 View [[id number]] Collected: 08/16/22 2222     Updated: 08/16/22 2225    Narrative:      PROCEDURE: XR CHEST 1 VW     COMPARISON: [facility], , CHEST AP/PA 1 VIEW, 7/23/2016, 20:48.     INDICATIONS: Stroke Protocol (Onset > 12  hrs)/stroke like symptoms     FINDINGS:   There is shunt catheter tubing traversing the right side of the chest.  There is a radiolucent   possibly discontinuous 4 mm segment in the catheter tubing projecting over the right lung apex.    There is mild stable interstitial disease in the left lung base.  No new lung opacity.  No   pneumothorax, pleural effusion or focal airspace consolidation.  The heart size and pulmonary   vasculature appear within normal limits.       Impression:         1. 4 mm apparent discontinuous segment in the  shunt catheter tubing projecting over the right   lung apex.  Comparison with prior imaging is recommended.  2. No acute cardiopulmonary abnormality.                  AILYN DUNLAP MD         Electronically Signed and Approved By: AILYN DUNLAP MD on 8/16/2022 at 22:22                     CT Head Without Contrast Stroke Protocol [[id number]] Collected: 08/16/22 2220     Updated: 08/16/22 2223    Narrative:      PROCEDURE: CT HEAD WO CONTRAST STROKE PROTOCOL     COMPARISON:  [facility], CT, HEAD W/O CONTRAST, 7/23/2016, 21:01.  INDICATIONS: Stroke, follow up  DIZZINESS AND DIFFICULTY WALKING TONIGHT     PROTOCOL:   Standard imaging protocol performed      RADIATION:   DLP: 1018.2mGy*cm    MA and/or KV was adjusted to minimize radiation dose.          TECHNIQUE: After obtaining the patient's consent, CT images were obtained without non-ionic   intravenous contrast material.      FINDINGS:   There is a new right parietal approach ventriculostomy catheter with the tip terminating in the   body of the left lateral ventricle.  There is stable size and configuration of the lateral   ventricles, which appear mildly and chronically enlarged.  There is a small tract of   encephalomalacia around the catheter tubing.  There are no additional new areas of hypoattenuation.    There is minimal patchy periventricular white matter hypoattenuation as a chronic finding.  The   remainder  of the cortex appears intact.  There is no acute intracranial hemorrhage.  No mass effect   or midline shift.  There is a small amount of calcium deposition in the central ralph.  Cerebellum   is unremarkable.  No abnormal extra-axial collections.  There is interval lens replacement surgery.    The paranasal sinuses and the mastoid air cells appear well-aerated.  Frontal sinuses are   hypoplastic.  Calvarium is intact.  Visualized shunt catheter tubing appears intact.       Impression:         1. Since 2016, there has been interval placement of a right parietal approach ventriculostomy   catheter with tip in the body of the left lateral ventricle.  There is stable size and   configuration of ventriculomegaly.  2. No evidence of acute hydrocephalus.  No acute intracranial abnormality.  3. Minimal chronic small vessel ischemic change.  4. Interval lens replacement surgery.            AILYN DUNLAP MD         Electronically Signed and Approved By: AILYN DUNLAP MD on 8/16/2022 at 22:20                            Labs Pending at Discharge:          Time spent on Discharge including face to face service: More than 30 minutes  Part of this note may be an electronic transcription/translation of spoken language to printed text using the Dragon Dictation System.     TElectronically signed by Brian Garcia MD, 08/18/22, 4:46 PM EDT.

## 2022-08-18 NOTE — CONSULTS
[facility]   Cardiology Consult Note    Patient Name: Jaxon Aggarwal  : 1945  MRN: [id number]  Primary Care Physician:  Max Castillo MD  Referring Physician: Neeraj Bowers MD  Primary Cardiologist: Lowell Krishnan MD ([facility])  Date of admission: 2022    Subjective   Subjective     Reason for Consultation : TIA, concerns for atrial fibrillation    Chief Complaint : Confusion, slurring of speech, difficulty in ambulating    HPI:  Jaxon Aggarwal is a 76 y.o. male with diabetes, hypertension, hyperlipidemia, history of  shunt for hydrocephalus, who presented to the hospital on 2022 because of multiple symptoms including dizziness, weakness, nausea without vomiting and reported slurring of speech.  He did not pass out.  Family called EMS.  Symptoms eventually improved and this morning is back to his baseline.  There were concerns for atrial fibrillation on telemetry hence cardiology was consulted.    Patient was recently seen by Dr. Lowell Krishnan, cardiologist at [facility] for similar symptoms including presyncope, dizziness and shortness of breath.  Work-up including echocardiogram and stress test were unremarkable.      Review of Systems   All systems were reviewed and negative except for fatigue, weakness, exertional shortness of breath, dizziness and presyncopal episodes.  Negative for chest pain or palpitations.    Personal History     Past Medical History:   Diagnosis Date   • Arthritis    • BPH (benign prostatic hyperplasia)    • Chronic allergic rhinitis    • Diabetes mellitus (HCC)    • GERD (gastroesophageal reflux disease)    • Headache 2021   • Hearing loss    • Hyperlipidemia    • Hypertension    • Hypogonadism in male    • Imbalance    • Mood disorder (HCC)    • Otitis media    • Prostate disorder    • Seasonal allergies    • Vertigo         Family History: family history includes Cancer in his father; Diabetes in an other family member; Stroke in his mother.  Otherwise pertinent FHx was reviewed and not pertinent to current issue.    Social History:  reports that he quit smoking about 25 years ago. His smoking use included cigarettes. He has a 15.00 pack-year smoking history. He has never used smokeless tobacco. He reports current alcohol use. He reports that he does not use drugs.    Home Medications:  Diclofenac Sodium, FLUoxetine, Fluticasone Furoate-Vilanterol, acetaminophen, amitriptyline, aspirin, atorvastatin, butalbital-acetaminophen-caffeine, cholecalciferol, diphenoxylate-atropine, finasteride, gabapentin, glucose blood, lisinopril-hydrochlorothiazide, memantine, metFORMIN, tamsulosin, and triamcinolone    Allergies:  No Known Allergies    Objective    Objective     Vitals:   Temp:  [98.2 °F (36.8 °C)-99.1 °F (37.3 °C)] 98.4 °F (36.9 °C)  Heart Rate:  [69-91] 91  Resp:  [16-18] 16  BP: (135-154)/(50-69) 135/50      Physical Exam:   Constitutional: Awake, alert, No acute distress    Eyes: PERRLA, sclerae anicteric, no conjunctival injection   HENT: NCAT, mucous membranes moist   Neck: Supple, no thyromegaly, no lymphadenopathy, trachea midline   Respiratory: Clear to auscultation bilaterally, nonlabored respirations    Cardiovascular: RRR, no murmurs, rubs, or gallops, palpable pedal pulses bilaterally   Gastrointestinal: Positive bowel sounds, soft, nontender, nondistended   Musculoskeletal: No bilateral ankle edema, no clubbing or cyanosis to extremities   Psychiatric: Appropriate affect, cooperative   Neurologic: Oriented x 3, strength symmetric in all extremities, Cranial Nerves grossly intact to confrontation, speech clear   Skin: No rashes     Result Review    Result Review:  I have personally reviewed the results from the time of this admission to 8/18/2022 16:22 EDT and agree with these findings:  [x]  Laboratory  []  Microbiology  [x]  Radiology  [x]  EKG/Telemetry   [x]  Cardiology/Vascular   []  Pathology  [x]  Old records  []  Other:  Most notable  findings include:     CMP    CMP 3/30/22 8/16/22 8/16/22 8/17/22     2155 2156    Glucose 130 (A) 258 (A)  118 (A)   BUN 20 26 (A)  25 (A)   Creatinine 0.85 1.03 1.00 0.81   Sodium 139 139  138   Potassium 4.4 3.8  3.8   Chloride 102 100  103   Calcium 10.0 9.9  9.4   Albumin 4.80 4.30     Total Bilirubin 0.2 0.2     Alkaline Phosphatase 85 112     AST (SGOT) 21 16     ALT (SGPT) 35 25     (A) Abnormal value       Comments are available for some flowsheets but are not being displayed.            CBC    CBC 3/30/22 8/16/22 8/17/22   WBC 7.40 9.99 10.54   RBC 4.65 4.20 3.90 (A)   Hemoglobin 15.0 13.7 12.5 (A)   Hematocrit 44.8 39.9 36.4 (A)   MCV 96.3 95.0 93.3   MCH 32.3 32.6 32.1   MCHC 33.5 34.3 34.3   RDW 12.6 13.3 13.2   Platelets 248 237 207   (A) Abnormal value             Results for orders placed during the hospital encounter of 08/16/22    Adult Transthoracic Echo Complete W/ Cont if Necessary Per Protocol (With Agitated Saline)    Interpretation Summary  · Calculated left ventricular EF = 65% Estimated left ventricular EF was in agreement with the calculated left ventricular EF. Left ventricular systolic function is normal.  · Left ventricular diastolic function is consistent with (grade I) impaired relaxation.  · Mild mitral valve regurgitation is present        EKG done today showed normal sinus rhythm normal axis, nonspecific T wave changes in lateral leads, abnormal EKG    Telemetry reviewed.  Sinus rhythm.  A short run of supraventricular ectopy noted this afternoon.  No episodes of atrial fibrillation or atrial flutter.    Assessment & Plan   Assessment / Plan     Brief Patient Summary:  Jaxon Aggarwal is a 76 y.o. male with diabetes, hypertension, hyperlipidemia, history of  shunt for hydrocephalus, who presented to the hospital on 8/16/2022 because of multiple symptoms including dizziness, weakness, nausea without vomiting and reported slurring of speech.    Active Hospital Problems:  Active  Hospital Problems    Diagnosis    • TIA (transient ischemic attack)      The clinical presentation is concerning for transient ischemic attack per neurology.  MRI not performed due to presence of  shunt.    No evidence of atrial fibrillation/flutter noted on telemetry.  He had a 10 beat run of supraventricular ectopy which was regular.    Plan:     Already started on aspirin Plavix for neurology, to be continued    Recommend to have a 1 month event monitor study as outpatient to completely rule out atrial arrhythmias.  Patient and spouse wanted to have it done with his primary cardiologist,  Dr. Krishnan.    Stable for discharge from cardiac standpoint.    Electronically signed by Neeraj Uribe MD, 08/18/22, 4:22 PM EDT.

## 2022-08-19 ENCOUNTER — READMISSION MANAGEMENT (OUTPATIENT)
Dept: CALL CENTER | Facility: HOSPITAL | Age: 77
End: 2022-08-19

## 2022-08-19 LAB — GLUCOSE BLDC GLUCOMTR-MCNC: 155 MG/DL (ref 70–99)

## 2022-08-19 NOTE — OUTREACH NOTE
Prep Survey    Flowsheet Row Responses   [facility] facility patient discharged from? [facility]   Is LACE score < 7 ? Yes   Emergency Room discharge w/ pulse ox? No   Eligibility Not Eligible  [low risk for readmit]   What are the reasons patient is not eligible? Other   Does the patient have one of the following disease processes/diagnoses(primary or secondary)? Other   Prep survey completed? Yes          JESUS FUNES - Registered Nurse

## 2022-08-21 LAB — QT INTERVAL: 364 MS

## 2022-08-27 PROBLEM — N40.0 BENIGN PROSTATIC HYPERPLASIA: Status: ACTIVE | Noted: 2022-08-27

## 2022-08-27 NOTE — PROGRESS NOTES
Chief Complaint    Urologic complaint    Subjective          Jaxon Aggarwal presents to [facility] UROLOGY  History of Present Illness    76-year-old gentleman      BPH   family history of prostate cancer      Patient was in the hospital earlier this month for possible TIA, was placed on Plavix.  He has followed up with his cardiologist and neurosurgeon, he may become off Plavix next few weeks      Slow stream, takes him a long time.  worse over the last year or 2.  Nocturia X 1..  No urgency or frequency.  No incontinence.        On Flomax 0.4 mg and finasteride 5 mg daily.    has a  shunt    3/22 0.8,   GFR 90    No cardiopulmonary history.  Patient does not smoke.  ASA 81     Patient cannot do office cystoscopy, too anxious    PVR     5/22     000   6/20    007     previous     No history of kidney stone.    Prostate cancer diagnosed around 61 in his brother    Has never had any urologic surgery.    Men in his family do not live to be old, mom lived to 97.    PSA       - Dr. Castillo    4/21    0.32  4/19    1.0            Past History:  Medical History: has a past medical history of Arthritis, BPH (benign prostatic hyperplasia), Chronic allergic rhinitis, Diabetes mellitus (HCC), GERD (gastroesophageal reflux disease), Headache (06/22/2021), Hearing loss, Hyperlipidemia, Hypertension, Hypogonadism in male, Imbalance, Mood disorder (HCC), Otitis media, Prostate disorder, Seasonal allergies, and Vertigo.   Surgical History: has a past surgical history that includes Eye surgery; Cystoscopy; Eye surgery; Other surgical history; Tonsillectomy; and Thyroid surgery.   Family History: family history includes Cancer in his father; Diabetes in an other family member; Stroke in his mother.   Social History: reports that he quit smoking about 25 years ago. His smoking use included cigarettes. He has a 15.00 pack-year smoking history. He has never used smokeless tobacco. He reports current alcohol use. He  reports that he does not use drugs.  Allergies: Patient has no known allergies.       Current Outpatient Medications:   •  acetaminophen (TYLENOL) 325 MG tablet, Take 650 mg by mouth Every 6 (Six) Hours As Needed for Mild Pain ., Disp: , Rfl:   •  amitriptyline (ELAVIL) 25 MG tablet, 25 mg Every Night., Disp: , Rfl:   •  aspirin 81 MG chewable tablet, Chew 81 mg Daily., Disp: , Rfl:   •  atorvastatin (LIPITOR) 20 MG tablet, Take 20 mg by mouth Every Night., Disp: , Rfl:   •  Breo Ellipta 100-25 MCG/INH inhaler, Inhale 1 puff Daily., Disp: , Rfl:   •  butalbital-acetaminophen-caffeine (FIORICET, ESGIC) -40 MG per tablet, Every 4 (Four) Hours As Needed for Migraine., Disp: , Rfl:   •  cholecalciferol (VITAMIN D3) 25 MCG (1000 UT) tablet, Take 1,000 Units by mouth Daily., Disp: , Rfl:   •  clopidogrel (PLAVIX) 75 MG tablet, Take 1 tablet by mouth Daily for 21 days., Disp: 21 tablet, Rfl: 0  •  Diclofenac Sodium (VOLTAREN) 1 % gel gel, Apply 4 g topically to the appropriate area as directed 4 (Four) Times a Day., Disp: 100 g, Rfl: 2  •  diphenoxylate-atropine (LOMOTIL) 2.5-0.025 MG per tablet, Take 1 tablet by mouth 3 (Three) Times a Day As Needed., Disp: , Rfl:   •  famotidine (PEPCID) 20 MG tablet, Take 1 tablet by mouth Every Night for 30 days., Disp: 30 tablet, Rfl: 0  •  finasteride (PROSCAR) 5 MG tablet, Take 5 mg by mouth Daily., Disp: , Rfl:   •  FLUoxetine (PROzac) 20 MG capsule, Take 40 mg by mouth Daily., Disp: , Rfl:   •  gabapentin (NEURONTIN) 300 MG capsule, Take 300 mg by mouth Every Night., Disp: , Rfl:   •  glucose blood test strip, 1 strip by Other route 4 (Four) Times a Day., Disp: , Rfl:   •  lisinopril-hydrochlorothiazide (PRINZIDE,ZESTORETIC) 20-25 MG per tablet, Take 0.5 tablets by mouth Daily., Disp: , Rfl:   •  memantine (NAMENDA) 5 MG tablet, Take 10 mg by mouth Daily., Disp: , Rfl:   •  metFORMIN (GLUCOPHAGE) 500 MG tablet, Take 1,000 mg by mouth 2 (Two) Times a Day With Meals., Disp: ,  Rfl:   •  tamsulosin (FLOMAX) 0.4 MG capsule 24 hr capsule, Take 1 capsule by mouth Daily., Disp: , Rfl:   •  triamcinolone (KENALOG) 0.1 % cream, 1 application 2 (Two) Times a Day As Needed., Disp: , Rfl:      Physical exam       Alert and orient x3  Well appearing, well developed, in no acute distress   Unlabored respirations      Grossly oriented to person, place and time, judgment is intact, normal mood and affect         Objective     Vital Signs:   There were no vitals taken for this visit.             Assessment and Plan    Diagnoses and all orders for this visit:    1. Benign prostatic hyperplasia, unspecified whether lower urinary tract symptoms present (Primary)      BPH      Cont  Flomax 0.4 mg daily and  finasteride 5 mg daily.       We did discuss TURP today.  Risks and benefits were discussed including bleeding, infection and damage to the urinary system.  We also discussed the risk of anesthesia up to and including death.  Patient voiced understanding. We will proceed.    Discussed 1% risk of incontinence and 5% risk of erectile dysfunction      Cardiac clearance.    Patient will stop aspirin 1 week before, should be off Plavix at that point hopefully

## 2022-08-30 ENCOUNTER — OFFICE VISIT (OUTPATIENT)
Dept: UROLOGY | Facility: CLINIC | Age: 77
End: 2022-08-30

## 2022-08-30 ENCOUNTER — PREP FOR SURGERY (OUTPATIENT)
Dept: OTHER | Facility: HOSPITAL | Age: 77
End: 2022-08-30

## 2022-08-30 VITALS — BODY MASS INDEX: 24.39 KG/M2 | HEIGHT: 67 IN | WEIGHT: 155.4 LBS

## 2022-08-30 DIAGNOSIS — N40.0 BPH (BENIGN PROSTATIC HYPERPLASIA): Primary | ICD-10-CM

## 2022-08-30 DIAGNOSIS — N40.0 BENIGN PROSTATIC HYPERPLASIA, UNSPECIFIED WHETHER LOWER URINARY TRACT SYMPTOMS PRESENT: Primary | ICD-10-CM

## 2022-08-30 PROCEDURE — 99214 OFFICE O/P EST MOD 30 MIN: CPT | Performed by: UROLOGY

## 2022-08-30 RX ORDER — LEVOFLOXACIN 5 MG/ML
500 INJECTION, SOLUTION INTRAVENOUS ONCE
Status: CANCELLED | OUTPATIENT
Start: 2022-08-30 | End: 2022-08-30

## 2022-08-30 RX ORDER — FLUOXETINE HYDROCHLORIDE 40 MG/1
40 CAPSULE ORAL DAILY
COMMUNITY
Start: 2022-08-24

## 2022-08-30 RX ORDER — MEMANTINE HYDROCHLORIDE 10 MG/1
10 TABLET ORAL DAILY
COMMUNITY
Start: 2022-06-24

## 2022-08-30 RX ORDER — KETOCONAZOLE 20 MG/ML
SHAMPOO TOPICAL
COMMUNITY
Start: 2022-06-28

## 2022-08-30 RX ORDER — SODIUM CHLORIDE 9 MG/ML
100 INJECTION, SOLUTION INTRAVENOUS CONTINUOUS
Status: CANCELLED | OUTPATIENT
Start: 2022-08-30

## 2022-08-30 RX ORDER — LANCETS
EACH MISCELLANEOUS
Status: ON HOLD | COMMUNITY
Start: 2022-06-02 | End: 2022-10-27

## 2022-08-30 RX ORDER — AMITRIPTYLINE HYDROCHLORIDE 50 MG/1
50 TABLET, FILM COATED ORAL NIGHTLY
COMMUNITY
Start: 2022-06-24

## 2022-09-13 ENCOUNTER — OFFICE VISIT (OUTPATIENT)
Dept: NEUROLOGY | Facility: CLINIC | Age: 77
End: 2022-09-13

## 2022-09-13 VITALS
SYSTOLIC BLOOD PRESSURE: 112 MMHG | WEIGHT: 159 LBS | DIASTOLIC BLOOD PRESSURE: 69 MMHG | HEART RATE: 79 BPM | BODY MASS INDEX: 24.96 KG/M2 | HEIGHT: 67 IN

## 2022-09-13 DIAGNOSIS — G45.9 TIA (TRANSIENT ISCHEMIC ATTACK): Primary | ICD-10-CM

## 2022-09-13 PROCEDURE — 99214 OFFICE O/P EST MOD 30 MIN: CPT | Performed by: PSYCHIATRY & NEUROLOGY

## 2022-09-13 RX ORDER — MECLIZINE HYDROCHLORIDE 25 MG/1
25 TABLET ORAL 3 TIMES DAILY PRN
COMMUNITY
Start: 2022-09-06

## 2022-09-13 NOTE — PROGRESS NOTES
"Chief Complaint  Hospital Follow Up Visit    Subjective          Jaxon Aggarwal is a 76 y.o. male who presents to [facility]  History of Present Illness  76-year-old man here for follow-up of his spell that occurred 4 weeks ago.  His wife is with him.  We went over the history.  According to the wife the patient hollered for her and woke her up from sleep and then when she saw him he was wild looking and trying to get up and was confused.  He was gibberish and was not saying any visible words in sentences for about 5 to 10 seconds.  She wanted to take him to the emergency room and he told her that he wanted to lie down.  Thereafter he told her that he wanted to go to the bathroom but he does not remember any of this.  He was very wobbly when they went to the bathroom and he was defecating and she went to the garage to get the car to take him to the hospital.  When she came back he was on the floor and he remembers going to the floor and he had to be cleaned.  He did walk into the car and then he went to the hospital where he was admitted.  By the time he got to the hospital he was more coherent.  He states that this happened to him about 4 years ago in a similar fashion but he passed out at that time.  He is undergoing cardiac work-up at this time.  He is off the Plavix about a week ago.  He also was already seen by his neurosurgeon Dr. Hammond in [place] and was told that he shunt looks good.  He was being followed by an out-of-town neurologist in the past for memory problems and gait abnormalities.  His memory has gotten better after he had a shunt.    He states that he can walk at home without a use of a cane or walker but he uses a four-point cane when he is out in the grass.  He uses a wheelchair when he has to walk for long period because he gets short of breath.    Objective   Vital Signs:   /69   Pulse 79   Ht 170.2 cm (67.01\")   Wt 72.1 kg (159 lb)   " BMI 24.90 kg/m²     Physical Exam   He is alert, fluent, phasic, follows commands well.  Heart is regular rhythm normal in rate.        Assessment and Plan  Diagnoses and all orders for this visit:    1. TIA (transient ischemic attack) (Primary)  Assessment & Plan:  I discussed with them that the diagnosis will be TIA since it does not appear that she had a seizure.  Nonetheless he is to follow-up with his cardiologist to make sure that he did not have a cardiac arrhythmia producing presyncope.  He is to follow-up with neurosurgery and I will see him in the future as needed.         Total time spent with the patient and coordinating patient care was 30 minutes.    Follow Up  No follow-ups on file.  Patient was given instructions and counseling regarding his condition or for health maintenance advice. Please see specific information pulled into the AVS if appropriate.

## 2022-09-14 ENCOUNTER — TRANSCRIBE ORDERS (OUTPATIENT)
Dept: LAB | Facility: HOSPITAL | Age: 77
End: 2022-09-14

## 2022-09-14 DIAGNOSIS — E11.65 TYPE II DIABETES MELLITUS WITH HYPEROSMOLARITY, UNCONTROLLED: Primary | ICD-10-CM

## 2022-09-14 DIAGNOSIS — E11.00 TYPE II DIABETES MELLITUS WITH HYPEROSMOLARITY, UNCONTROLLED: Primary | ICD-10-CM

## 2022-09-23 ENCOUNTER — LAB (OUTPATIENT)
Dept: LAB | Facility: HOSPITAL | Age: 77
End: 2022-09-23

## 2022-09-23 DIAGNOSIS — E11.00 TYPE II DIABETES MELLITUS WITH HYPEROSMOLARITY, UNCONTROLLED: ICD-10-CM

## 2022-09-23 DIAGNOSIS — E11.65 TYPE II DIABETES MELLITUS WITH HYPEROSMOLARITY, UNCONTROLLED: ICD-10-CM

## 2022-09-23 LAB
ALBUMIN SERPL-MCNC: 4.3 G/DL (ref 3.5–5.2)
ALBUMIN/GLOB SERPL: 1.9 G/DL
ALP SERPL-CCNC: 67 U/L (ref 39–117)
ALT SERPL W P-5'-P-CCNC: 22 U/L (ref 1–41)
ANION GAP SERPL CALCULATED.3IONS-SCNC: 7.4 MMOL/L (ref 5–15)
AST SERPL-CCNC: 17 U/L (ref 1–40)
BILIRUB SERPL-MCNC: 0.2 MG/DL (ref 0–1.2)
BUN SERPL-MCNC: 14 MG/DL (ref 8–23)
BUN/CREAT SERPL: 17.1 (ref 7–25)
CALCIUM SPEC-SCNC: 9.4 MG/DL (ref 8.6–10.5)
CHLORIDE SERPL-SCNC: 100 MMOL/L (ref 98–107)
CO2 SERPL-SCNC: 28.6 MMOL/L (ref 22–29)
CREAT SERPL-MCNC: 0.82 MG/DL (ref 0.76–1.27)
EGFRCR SERPLBLD CKD-EPI 2021: 91.6 ML/MIN/1.73
GLOBULIN UR ELPH-MCNC: 2.3 GM/DL
GLUCOSE SERPL-MCNC: 169 MG/DL (ref 65–99)
HBA1C MFR BLD: 6.3 % (ref 4.8–5.6)
POTASSIUM SERPL-SCNC: 4 MMOL/L (ref 3.5–5.2)
PROT SERPL-MCNC: 6.6 G/DL (ref 6–8.5)
SODIUM SERPL-SCNC: 136 MMOL/L (ref 136–145)

## 2022-09-23 PROCEDURE — 80053 COMPREHEN METABOLIC PANEL: CPT

## 2022-09-23 PROCEDURE — 36415 COLL VENOUS BLD VENIPUNCTURE: CPT

## 2022-09-23 PROCEDURE — 83036 HEMOGLOBIN GLYCOSYLATED A1C: CPT

## 2022-10-10 ENCOUNTER — TELEPHONE (OUTPATIENT)
Dept: UROLOGY | Facility: CLINIC | Age: 77
End: 2022-10-10

## 2022-10-10 ENCOUNTER — PRE-ADMISSION TESTING (OUTPATIENT)
Dept: PREADMISSION TESTING | Facility: HOSPITAL | Age: 77
End: 2022-10-10

## 2022-10-10 ENCOUNTER — HOSPITAL ENCOUNTER (OUTPATIENT)
Dept: GENERAL RADIOLOGY | Facility: HOSPITAL | Age: 77
Discharge: HOME | End: 2022-10-10

## 2022-10-10 VITALS
SYSTOLIC BLOOD PRESSURE: 118 MMHG | DIASTOLIC BLOOD PRESSURE: 62 MMHG | RESPIRATION RATE: 20 BRPM | TEMPERATURE: 98.4 F | OXYGEN SATURATION: 96 % | BODY MASS INDEX: 25.11 KG/M2 | HEIGHT: 67 IN | WEIGHT: 160 LBS | HEART RATE: 69 BPM

## 2022-10-10 DIAGNOSIS — N40.0 BENIGN PROSTATIC HYPERPLASIA, UNSPECIFIED WHETHER LOWER URINARY TRACT SYMPTOMS PRESENT: ICD-10-CM

## 2022-10-10 DIAGNOSIS — N40.0 BPH (BENIGN PROSTATIC HYPERPLASIA): ICD-10-CM

## 2022-10-10 LAB
ANION GAP SERPL CALCULATED.3IONS-SCNC: 11.1 MMOL/L (ref 5–15)
BUN SERPL-MCNC: 20 MG/DL (ref 8–23)
BUN/CREAT SERPL: 26.7 (ref 7–25)
CALCIUM SPEC-SCNC: 9.4 MG/DL (ref 8.6–10.5)
CHLORIDE SERPL-SCNC: 102 MMOL/L (ref 98–107)
CO2 SERPL-SCNC: 22.9 MMOL/L (ref 22–29)
CREAT SERPL-MCNC: 0.75 MG/DL (ref 0.76–1.27)
EGFRCR SERPLBLD CKD-EPI 2021: 94.1 ML/MIN/1.73
GLUCOSE SERPL-MCNC: 95 MG/DL (ref 65–99)
POTASSIUM SERPL-SCNC: 4.4 MMOL/L (ref 3.5–5.2)
SODIUM SERPL-SCNC: 136 MMOL/L (ref 136–145)

## 2022-10-10 PROCEDURE — 71046 X-RAY EXAM CHEST 2 VIEWS: CPT

## 2022-10-10 PROCEDURE — 36415 COLL VENOUS BLD VENIPUNCTURE: CPT

## 2022-10-10 PROCEDURE — 80048 BASIC METABOLIC PNL TOTAL CA: CPT

## 2022-10-10 RX ORDER — ATENOLOL 25 MG/1
12.5 TABLET ORAL DAILY
COMMUNITY
Start: 2022-09-28

## 2022-10-10 NOTE — DISCHARGE INSTRUCTIONS
IMPORTANT INSTRUCTIONS - PRE-ADMISSION TESTING  DO NOT EAT OR CHEW anything after midnight the night before your procedure.    You may have CLEAR liquids up to 3 hours prior to ARRIVAL time.   Take the following medications the morning of your procedure with JUST A SIP OF WATER:  Atenolol, Lomotil, Prozac, Atorvastatin, Finasteride, Breo inhaler, Memantine, Tamsulosin  Do not take Metformin later than 6 PM day before surgery  DO NOT BRING your medications to the hospital with you, UNLESS something has changed since your PRE-Admission Testing appointment.  Hold all vitamins, supplements, and NSAIDS (Non- steroidal anti-inflammatory meds) for one week prior to surgery (you MAY take Tylenol or Acetaminophen).  If you are diabetic, check your blood sugar the morning of your procedure. If it is less than 70 or if you are feeling symptomatic, call the following number for further instructions: [phone].  Use your inhalers/nebulizers as usual, the morning of your procedure. BRING YOUR INHALERS with you.   Make sure you have a ride home and have someone who will stay with you the day of your procedure after you go home.  If you have any questions, please call your Pre-Admission Testing Nurse, Vvii at [phone].   Per anesthesia request, do not smoke for 24 hours before your procedure or as instructed by your surgeon.       Clear Liquid Diet        Find out when you need to start a clear liquid diet.   Think of “clear liquids” as anything you could read a newspaper through. This includes things like water, broth, sports drinks, or tea WITHOUT any kind of milk or cream.           Once you are told to start a clear liquid diet, only drink these things until 2 hours before arrival to the hospital or when the hospital says to stop. Total volume limitation: 8 oz.       Clear liquids you CAN drink:   Water   Clear broth: beef, chicken, vegetable, or bone broth with nothing in it   Gatorade   Lemonade or Ricky-aid   Soda    Tea, coffee (NO cream or honey)   Jell-O (without fruit)   Popsicles (without fruit or cream)   Italian ices   Juice without pulp: apple, white, grape   You may use salt, pepper, and sugar    Do NOT drink:   Milk or cream   Soy milk, almond milk, coconut milk, or other non-dairy drinks and   creamers   Milkshakes or smoothies   Tomato juice   Orange juice   Grapefruit juice   Cream soups or any other than broth         Clear Liquid Diet:  Do NOT eat any solid food.  Do NOT eat or suck on mints or candy.  Do NOT chew gum.  Do NOT drink thick liquids like milk or juice with pulp in it.  Do NOT add milk, cream, or anything like soy milk or almond milk to coffee or tea.

## 2022-10-10 NOTE — TELEPHONE ENCOUNTER
LVM on Vivi's voicemail in [facility] letting her know patient is to stop ASA 1 week prior to sx according to Juan last note. Also spoke to patients wife giving her this instruction. She verbalized understanding via teach back.

## 2022-10-10 NOTE — NURSING NOTE
Urine collected during visit spilt while on the way to lab. Spoke with Stacey patient's wife, they will be in [place] tomorrow and he will give a sample at the [facility].

## 2022-10-11 ENCOUNTER — LAB (OUTPATIENT)
Dept: LAB | Facility: HOSPITAL | Age: 77
End: 2022-10-11

## 2022-10-11 PROCEDURE — 87086 URINE CULTURE/COLONY COUNT: CPT

## 2022-10-12 LAB — BACTERIA SPEC AEROBE CULT: NO GROWTH

## 2022-10-13 ENCOUNTER — ANESTHESIA EVENT (OUTPATIENT)
Dept: PERIOP | Facility: HOSPITAL | Age: 77
End: 2022-10-13

## 2022-10-19 ENCOUNTER — TELEPHONE (OUTPATIENT)
Dept: UROLOGY | Facility: CLINIC | Age: 77
End: 2022-10-19

## 2022-10-19 NOTE — TELEPHONE ENCOUNTER
[facility] [place]  [facility]  [address]  [place] [address]  Fax [phone]  Phone [phone]     To whom it may concern:    I am writing on behalf of our mutual patient Jaxon Aggarwal 1945     Jaxon Aggarwal  is scheduled to have a transurethral resection of the prostate by Donald Martinez MD which will be performed at [facility] on 10/27/22. Please respond to this request noting your recommendations regarding clearance from the cardiology standpoint. You may contact our office at [phone] with any questions. I appreciate your prompt response to this matter. Please return this form to our office as soon as possible to [phone]. Jaxon Aggarwal is scheduled for this procedure pending your approval. Thank you for your time and assistance.     [] I approve my patient, Jaxon Aggarwal , to proceed with the surgical procedure listed above.   [] I do NOT approve my patient, Jaxon Aggarwal , to proceed with the surgical procedure listed above.   [] I approve my patient, Jaxon Aggarwal , from a cardiology standpoint.   [] I do NOT approve my patient, Jaxon Aggarwal , from a cardiology standpoint at this time.       Approving physician name(please print): ________________________________________    Approving physician signature: _____________________________________________ Date: ____________________________    Sincerely,     Donald Martinez MD

## 2022-10-27 ENCOUNTER — HOSPITAL ENCOUNTER (OUTPATIENT)
Facility: HOSPITAL | Age: 77
Discharge: HOME | End: 2022-10-28
Attending: UROLOGY | Admitting: UROLOGY

## 2022-10-27 ENCOUNTER — ANESTHESIA (OUTPATIENT)
Dept: PERIOP | Facility: HOSPITAL | Age: 77
End: 2022-10-27

## 2022-10-27 DIAGNOSIS — N40.0 BPH (BENIGN PROSTATIC HYPERPLASIA): ICD-10-CM

## 2022-10-27 LAB
GLUCOSE BLDC GLUCOMTR-MCNC: 104 MG/DL (ref 70–99)
GLUCOSE BLDC GLUCOMTR-MCNC: 107 MG/DL (ref 70–99)
GLUCOSE BLDC GLUCOMTR-MCNC: 201 MG/DL (ref 70–99)
GLUCOSE BLDC GLUCOMTR-MCNC: 219 MG/DL (ref 70–99)
GLUCOSE BLDC GLUCOMTR-MCNC: 245 MG/DL (ref 70–99)

## 2022-10-27 PROCEDURE — 25010000002 FENTANYL CITRATE (PF) 50 MCG/ML SOLUTION: Performed by: NURSE ANESTHETIST, CERTIFIED REGISTERED

## 2022-10-27 PROCEDURE — 63710000001 AMITRIPTYLINE 50 MG TABLET: Performed by: UROLOGY

## 2022-10-27 PROCEDURE — 63710000001 ACETAMINOPHEN 500 MG TABLET: Performed by: ANESTHESIOLOGY

## 2022-10-27 PROCEDURE — A9270 NON-COVERED ITEM OR SERVICE: HCPCS | Performed by: UROLOGY

## 2022-10-27 PROCEDURE — 52601 PROSTATECTOMY (TURP): CPT | Performed by: UROLOGY

## 2022-10-27 PROCEDURE — A9270 NON-COVERED ITEM OR SERVICE: HCPCS | Performed by: ANESTHESIOLOGY

## 2022-10-27 PROCEDURE — 94761 N-INVAS EAR/PLS OXIMETRY MLT: CPT

## 2022-10-27 PROCEDURE — 94799 UNLISTED PULMONARY SVC/PX: CPT

## 2022-10-27 PROCEDURE — 82962 GLUCOSE BLOOD TEST: CPT

## 2022-10-27 PROCEDURE — 63710000001 OXYCODONE 5 MG TABLET: Performed by: UROLOGY

## 2022-10-27 PROCEDURE — 63710000001 FLUOXETINE 20 MG CAPSULE: Performed by: UROLOGY

## 2022-10-27 PROCEDURE — 25010000002 KETOROLAC TROMETHAMINE PER 15 MG: Performed by: NURSE ANESTHETIST, CERTIFIED REGISTERED

## 2022-10-27 PROCEDURE — 63710000001 ATORVASTATIN 20 MG TABLET: Performed by: UROLOGY

## 2022-10-27 PROCEDURE — 25010000002 DEXAMETHASONE PER 1 MG: Performed by: NURSE ANESTHETIST, CERTIFIED REGISTERED

## 2022-10-27 PROCEDURE — 63710000001 GABAPENTIN 300 MG CAPSULE: Performed by: UROLOGY

## 2022-10-27 PROCEDURE — 25010000002 ONDANSETRON PER 1 MG: Performed by: UROLOGY

## 2022-10-27 PROCEDURE — 88305 TISSUE EXAM BY PATHOLOGIST: CPT | Performed by: UROLOGY

## 2022-10-27 PROCEDURE — 63710000001 MEMANTINE 10 MG TABLET: Performed by: UROLOGY

## 2022-10-27 PROCEDURE — 93005 ELECTROCARDIOGRAM TRACING: CPT | Performed by: UROLOGY

## 2022-10-27 PROCEDURE — 25010000002 ONDANSETRON PER 1 MG: Performed by: NURSE ANESTHETIST, CERTIFIED REGISTERED

## 2022-10-27 PROCEDURE — 25010000002 MIDAZOLAM PER 1 MG: Performed by: ANESTHESIOLOGY

## 2022-10-27 PROCEDURE — 25010000002 LEVOFLOXACIN PER 250 MG: Performed by: UROLOGY

## 2022-10-27 PROCEDURE — 63710000001 INSULIN REGULAR HUMAN PER 5 UNITS: Performed by: UROLOGY

## 2022-10-27 PROCEDURE — 94640 AIRWAY INHALATION TREATMENT: CPT

## 2022-10-27 PROCEDURE — 63710000001 ATENOLOL 25 MG TABLET: Performed by: UROLOGY

## 2022-10-27 PROCEDURE — 25010000002 PROPOFOL 10 MG/ML EMULSION: Performed by: NURSE ANESTHETIST, CERTIFIED REGISTERED

## 2022-10-27 PROCEDURE — 63710000001 ACETAMINOPHEN 500 MG TABLET: Performed by: UROLOGY

## 2022-10-27 PROCEDURE — 0 MEPERIDINE PER 100 MG: Performed by: NURSE ANESTHETIST, CERTIFIED REGISTERED

## 2022-10-27 RX ORDER — PROMETHAZINE HYDROCHLORIDE 25 MG/1
25 SUPPOSITORY RECTAL ONCE AS NEEDED
Status: DISCONTINUED | OUTPATIENT
Start: 2022-10-27 | End: 2022-10-27 | Stop reason: HOSPADM

## 2022-10-27 RX ORDER — ACETAMINOPHEN 650 MG/1
650 SUPPOSITORY RECTAL EVERY 8 HOURS
Status: DISCONTINUED | OUTPATIENT
Start: 2022-10-27 | End: 2022-10-28 | Stop reason: HOSPADM

## 2022-10-27 RX ORDER — ARFORMOTEROL TARTRATE 15 UG/2ML
SOLUTION RESPIRATORY (INHALATION)
Status: DISPENSED
Start: 2022-10-27 | End: 2022-10-27

## 2022-10-27 RX ORDER — NALOXONE HCL 0.4 MG/ML
0.4 VIAL (ML) INJECTION
Status: DISCONTINUED | OUTPATIENT
Start: 2022-10-27 | End: 2022-10-28 | Stop reason: HOSPADM

## 2022-10-27 RX ORDER — GLYCOPYRROLATE 0.2 MG/ML
INJECTION INTRAMUSCULAR; INTRAVENOUS AS NEEDED
Status: DISCONTINUED | OUTPATIENT
Start: 2022-10-27 | End: 2022-10-27 | Stop reason: SURG

## 2022-10-27 RX ORDER — SODIUM CHLORIDE 9 MG/ML
70 INJECTION, SOLUTION INTRAVENOUS CONTINUOUS
Status: DISCONTINUED | OUTPATIENT
Start: 2022-10-27 | End: 2022-10-28 | Stop reason: HOSPADM

## 2022-10-27 RX ORDER — MAGNESIUM HYDROXIDE 1200 MG/15ML
LIQUID ORAL AS NEEDED
Status: DISCONTINUED | OUTPATIENT
Start: 2022-10-27 | End: 2022-10-27 | Stop reason: HOSPADM

## 2022-10-27 RX ORDER — NICOTINE POLACRILEX 4 MG
15 LOZENGE BUCCAL
Status: DISCONTINUED | OUTPATIENT
Start: 2022-10-27 | End: 2022-10-28 | Stop reason: HOSPADM

## 2022-10-27 RX ORDER — DOCUSATE SODIUM 100 MG/1
100 CAPSULE, LIQUID FILLED ORAL 2 TIMES DAILY PRN
Status: DISCONTINUED | OUTPATIENT
Start: 2022-10-27 | End: 2022-10-28 | Stop reason: HOSPADM

## 2022-10-27 RX ORDER — ACETAMINOPHEN 500 MG
1000 TABLET ORAL EVERY 8 HOURS
Status: DISCONTINUED | OUTPATIENT
Start: 2022-10-27 | End: 2022-10-28 | Stop reason: HOSPADM

## 2022-10-27 RX ORDER — KETOROLAC TROMETHAMINE 30 MG/ML
INJECTION, SOLUTION INTRAMUSCULAR; INTRAVENOUS AS NEEDED
Status: DISCONTINUED | OUTPATIENT
Start: 2022-10-27 | End: 2022-10-27 | Stop reason: SURG

## 2022-10-27 RX ORDER — GABAPENTIN 300 MG/1
300 CAPSULE ORAL NIGHTLY
Status: DISCONTINUED | OUTPATIENT
Start: 2022-10-27 | End: 2022-10-28 | Stop reason: HOSPADM

## 2022-10-27 RX ORDER — ROCURONIUM BROMIDE 10 MG/ML
INJECTION, SOLUTION INTRAVENOUS AS NEEDED
Status: DISCONTINUED | OUTPATIENT
Start: 2022-10-27 | End: 2022-10-27 | Stop reason: SURG

## 2022-10-27 RX ORDER — LEVOFLOXACIN 5 MG/ML
500 INJECTION, SOLUTION INTRAVENOUS ONCE
Status: COMPLETED | OUTPATIENT
Start: 2022-10-27 | End: 2022-10-27

## 2022-10-27 RX ORDER — DIPHENOXYLATE HYDROCHLORIDE AND ATROPINE SULFATE 2.5; .025 MG/1; MG/1
1 TABLET ORAL 3 TIMES DAILY PRN
Status: DISCONTINUED | OUTPATIENT
Start: 2022-10-27 | End: 2022-10-28 | Stop reason: HOSPADM

## 2022-10-27 RX ORDER — ATENOLOL 25 MG/1
12.5 TABLET ORAL DAILY
Status: DISCONTINUED | OUTPATIENT
Start: 2022-10-27 | End: 2022-10-28 | Stop reason: HOSPADM

## 2022-10-27 RX ORDER — FLUOXETINE HYDROCHLORIDE 20 MG/1
40 CAPSULE ORAL DAILY
Status: DISCONTINUED | OUTPATIENT
Start: 2022-10-27 | End: 2022-10-28 | Stop reason: HOSPADM

## 2022-10-27 RX ORDER — ONDANSETRON 2 MG/ML
4 INJECTION INTRAMUSCULAR; INTRAVENOUS ONCE AS NEEDED
Status: DISCONTINUED | OUTPATIENT
Start: 2022-10-27 | End: 2022-10-27 | Stop reason: HOSPADM

## 2022-10-27 RX ORDER — MEPERIDINE HYDROCHLORIDE 25 MG/ML
12.5 INJECTION INTRAMUSCULAR; INTRAVENOUS; SUBCUTANEOUS
Status: DISCONTINUED | OUTPATIENT
Start: 2022-10-27 | End: 2022-10-27 | Stop reason: HOSPADM

## 2022-10-27 RX ORDER — ACETAMINOPHEN 500 MG
1000 TABLET ORAL ONCE
Status: COMPLETED | OUTPATIENT
Start: 2022-10-27 | End: 2022-10-27

## 2022-10-27 RX ORDER — MEMANTINE HYDROCHLORIDE 10 MG/1
10 TABLET ORAL DAILY
Status: DISCONTINUED | OUTPATIENT
Start: 2022-10-27 | End: 2022-10-28 | Stop reason: HOSPADM

## 2022-10-27 RX ORDER — DEXTROSE MONOHYDRATE 25 G/50ML
25 INJECTION, SOLUTION INTRAVENOUS
Status: DISCONTINUED | OUTPATIENT
Start: 2022-10-27 | End: 2022-10-28 | Stop reason: HOSPADM

## 2022-10-27 RX ORDER — SODIUM CHLORIDE 9 MG/ML
100 INJECTION, SOLUTION INTRAVENOUS CONTINUOUS
Status: DISCONTINUED | OUTPATIENT
Start: 2022-10-27 | End: 2022-10-27

## 2022-10-27 RX ORDER — PROPOFOL 10 MG/ML
VIAL (ML) INTRAVENOUS AS NEEDED
Status: DISCONTINUED | OUTPATIENT
Start: 2022-10-27 | End: 2022-10-27 | Stop reason: SURG

## 2022-10-27 RX ORDER — GLYCOPYRROLATE 0.2 MG/ML
0.2 INJECTION INTRAMUSCULAR; INTRAVENOUS
Status: COMPLETED | OUTPATIENT
Start: 2022-10-27 | End: 2022-10-27

## 2022-10-27 RX ORDER — FENTANYL CITRATE 50 UG/ML
INJECTION, SOLUTION INTRAMUSCULAR; INTRAVENOUS AS NEEDED
Status: DISCONTINUED | OUTPATIENT
Start: 2022-10-27 | End: 2022-10-27 | Stop reason: SURG

## 2022-10-27 RX ORDER — ONDANSETRON 4 MG/1
4 TABLET, FILM COATED ORAL EVERY 6 HOURS PRN
Status: DISCONTINUED | OUTPATIENT
Start: 2022-10-27 | End: 2022-10-28 | Stop reason: HOSPADM

## 2022-10-27 RX ORDER — AMITRIPTYLINE HYDROCHLORIDE 50 MG/1
50 TABLET, FILM COATED ORAL NIGHTLY
Status: DISCONTINUED | OUTPATIENT
Start: 2022-10-27 | End: 2022-10-28 | Stop reason: HOSPADM

## 2022-10-27 RX ORDER — OXYCODONE HYDROCHLORIDE 5 MG/1
5 TABLET ORAL EVERY 4 HOURS PRN
Status: DISCONTINUED | OUTPATIENT
Start: 2022-10-27 | End: 2022-10-28 | Stop reason: HOSPADM

## 2022-10-27 RX ORDER — LIDOCAINE HYDROCHLORIDE 20 MG/ML
INJECTION, SOLUTION EPIDURAL; INFILTRATION; INTRACAUDAL; PERINEURAL AS NEEDED
Status: DISCONTINUED | OUTPATIENT
Start: 2022-10-27 | End: 2022-10-27 | Stop reason: SURG

## 2022-10-27 RX ORDER — MIDAZOLAM HYDROCHLORIDE 1 MG/ML
2 INJECTION INTRAMUSCULAR; INTRAVENOUS ONCE
Status: COMPLETED | OUTPATIENT
Start: 2022-10-27 | End: 2022-10-27

## 2022-10-27 RX ORDER — DEXAMETHASONE SODIUM PHOSPHATE 4 MG/ML
INJECTION, SOLUTION INTRA-ARTICULAR; INTRALESIONAL; INTRAMUSCULAR; INTRAVENOUS; SOFT TISSUE AS NEEDED
Status: DISCONTINUED | OUTPATIENT
Start: 2022-10-27 | End: 2022-10-27 | Stop reason: SURG

## 2022-10-27 RX ORDER — ALENDRONATE SODIUM 70 MG/1
70 TABLET ORAL
COMMUNITY

## 2022-10-27 RX ORDER — ONDANSETRON 2 MG/ML
4 INJECTION INTRAMUSCULAR; INTRAVENOUS EVERY 6 HOURS PRN
Status: DISCONTINUED | OUTPATIENT
Start: 2022-10-27 | End: 2022-10-28 | Stop reason: HOSPADM

## 2022-10-27 RX ORDER — SODIUM CHLORIDE, SODIUM LACTATE, POTASSIUM CHLORIDE, CALCIUM CHLORIDE 600; 310; 30; 20 MG/100ML; MG/100ML; MG/100ML; MG/100ML
9 INJECTION, SOLUTION INTRAVENOUS CONTINUOUS PRN
Status: DISCONTINUED | OUTPATIENT
Start: 2022-10-27 | End: 2022-10-27 | Stop reason: HOSPADM

## 2022-10-27 RX ORDER — KETAMINE HCL IN NACL, ISO-OSM 100MG/10ML
SYRINGE (ML) INJECTION AS NEEDED
Status: DISCONTINUED | OUTPATIENT
Start: 2022-10-27 | End: 2022-10-27 | Stop reason: SURG

## 2022-10-27 RX ORDER — ARFORMOTEROL TARTRATE 15 UG/2ML
15 SOLUTION RESPIRATORY (INHALATION)
Status: DISCONTINUED | OUTPATIENT
Start: 2022-10-27 | End: 2022-10-28 | Stop reason: HOSPADM

## 2022-10-27 RX ORDER — ATORVASTATIN CALCIUM 20 MG/1
20 TABLET, FILM COATED ORAL NIGHTLY
Status: DISCONTINUED | OUTPATIENT
Start: 2022-10-27 | End: 2022-10-28 | Stop reason: HOSPADM

## 2022-10-27 RX ORDER — BUDESONIDE 0.25 MG/2ML
INHALANT ORAL
Status: DISPENSED
Start: 2022-10-27 | End: 2022-10-27

## 2022-10-27 RX ORDER — UBIDECARENONE 200 MG
200 CAPSULE ORAL DAILY
COMMUNITY

## 2022-10-27 RX ORDER — PROMETHAZINE HYDROCHLORIDE 12.5 MG/1
25 TABLET ORAL ONCE AS NEEDED
Status: DISCONTINUED | OUTPATIENT
Start: 2022-10-27 | End: 2022-10-27 | Stop reason: HOSPADM

## 2022-10-27 RX ORDER — OXYCODONE HYDROCHLORIDE 5 MG/1
5 TABLET ORAL
Status: DISCONTINUED | OUTPATIENT
Start: 2022-10-27 | End: 2022-10-27 | Stop reason: HOSPADM

## 2022-10-27 RX ORDER — MORPHINE SULFATE 2 MG/ML
2 INJECTION, SOLUTION INTRAMUSCULAR; INTRAVENOUS
Status: DISCONTINUED | OUTPATIENT
Start: 2022-10-27 | End: 2022-10-28 | Stop reason: HOSPADM

## 2022-10-27 RX ORDER — PROMETHAZINE HYDROCHLORIDE 12.5 MG/1
12.5 TABLET ORAL EVERY 6 HOURS PRN
Status: DISCONTINUED | OUTPATIENT
Start: 2022-10-27 | End: 2022-10-28 | Stop reason: HOSPADM

## 2022-10-27 RX ORDER — BUDESONIDE 0.25 MG/2ML
0.25 INHALANT ORAL
Status: DISCONTINUED | OUTPATIENT
Start: 2022-10-27 | End: 2022-10-28 | Stop reason: HOSPADM

## 2022-10-27 RX ADMIN — DEXAMETHASONE SODIUM PHOSPHATE 4 MG: 4 INJECTION, SOLUTION INTRA-ARTICULAR; INTRALESIONAL; INTRAMUSCULAR; INTRAVENOUS; SOFT TISSUE at 07:30

## 2022-10-27 RX ADMIN — LIDOCAINE HYDROCHLORIDE 100 MG: 20 INJECTION, SOLUTION EPIDURAL; INFILTRATION; INTRACAUDAL; PERINEURAL at 07:23

## 2022-10-27 RX ADMIN — MEPERIDINE HYDROCHLORIDE 12.5 MG: 25 INJECTION INTRAMUSCULAR; INTRAVENOUS; SUBCUTANEOUS at 08:47

## 2022-10-27 RX ADMIN — Medication 20 MG: at 07:35

## 2022-10-27 RX ADMIN — PROPOFOL 100 MG: 10 INJECTION, EMULSION INTRAVENOUS at 07:23

## 2022-10-27 RX ADMIN — SUGAMMADEX 200 MG: 100 INJECTION, SOLUTION INTRAVENOUS at 07:58

## 2022-10-27 RX ADMIN — SODIUM CHLORIDE, POTASSIUM CHLORIDE, SODIUM LACTATE AND CALCIUM CHLORIDE 9 ML/HR: 600; 310; 30; 20 INJECTION, SOLUTION INTRAVENOUS at 06:53

## 2022-10-27 RX ADMIN — ACETAMINOPHEN 1000 MG: 500 TABLET, FILM COATED ORAL at 07:03

## 2022-10-27 RX ADMIN — KETOROLAC TROMETHAMINE 15 MG: 30 INJECTION, SOLUTION INTRAMUSCULAR; INTRAVENOUS at 07:57

## 2022-10-27 RX ADMIN — INSULIN HUMAN 4 UNITS: 100 INJECTION, SOLUTION PARENTERAL at 17:08

## 2022-10-27 RX ADMIN — OXYCODONE HYDROCHLORIDE 5 MG: 5 TABLET ORAL at 15:11

## 2022-10-27 RX ADMIN — OXYCODONE HYDROCHLORIDE 5 MG: 5 TABLET ORAL at 23:49

## 2022-10-27 RX ADMIN — ARFORMOTEROL TARTRATE 15 MCG: 15 SOLUTION RESPIRATORY (INHALATION) at 18:58

## 2022-10-27 RX ADMIN — FLUOXETINE 40 MG: 20 CAPSULE ORAL at 15:11

## 2022-10-27 RX ADMIN — INSULIN HUMAN 4 UNITS: 100 INJECTION, SOLUTION PARENTERAL at 11:42

## 2022-10-27 RX ADMIN — LEVOFLOXACIN 500 MG: 500 INJECTION, SOLUTION INTRAVENOUS at 07:28

## 2022-10-27 RX ADMIN — AMITRIPTYLINE HYDROCHLORIDE 50 MG: 50 TABLET, FILM COATED ORAL at 22:10

## 2022-10-27 RX ADMIN — GLYCOPYRROLATE 0.2 MG: 0.2 INJECTION INTRAMUSCULAR; INTRAVENOUS at 07:29

## 2022-10-27 RX ADMIN — MIDAZOLAM 2 MG: 1 INJECTION, SOLUTION INTRAMUSCULAR; INTRAVENOUS at 07:03

## 2022-10-27 RX ADMIN — ROCURONIUM BROMIDE 50 MG: 10 INJECTION INTRAVENOUS at 07:24

## 2022-10-27 RX ADMIN — FENTANYL CITRATE 50 MCG: 50 INJECTION, SOLUTION INTRAMUSCULAR; INTRAVENOUS at 07:23

## 2022-10-27 RX ADMIN — ARFORMOTEROL TARTRATE: 15 SOLUTION RESPIRATORY (INHALATION) at 10:51

## 2022-10-27 RX ADMIN — ACETAMINOPHEN 1000 MG: 500 TABLET, FILM COATED ORAL at 11:38

## 2022-10-27 RX ADMIN — PROPOFOL 200 MCG/KG/MIN: 10 INJECTION, EMULSION INTRAVENOUS at 07:24

## 2022-10-27 RX ADMIN — BUDESONIDE 0.25 MG: 0.25 SUSPENSION RESPIRATORY (INHALATION) at 18:58

## 2022-10-27 RX ADMIN — SODIUM CHLORIDE 70 ML/HR: 9 INJECTION, SOLUTION INTRAVENOUS at 20:15

## 2022-10-27 RX ADMIN — ATORVASTATIN CALCIUM 20 MG: 20 TABLET, FILM COATED ORAL at 22:10

## 2022-10-27 RX ADMIN — ONDANSETRON 4 MG: 2 INJECTION INTRAMUSCULAR; INTRAVENOUS at 21:18

## 2022-10-27 RX ADMIN — ONDANSETRON 4 MG: 2 INJECTION INTRAMUSCULAR; INTRAVENOUS at 07:56

## 2022-10-27 RX ADMIN — MEMANTINE 10 MG: 10 TABLET ORAL at 15:12

## 2022-10-27 RX ADMIN — GLYCOPYRROLATE 0.2 MG: 0.2 INJECTION INTRAMUSCULAR; INTRAVENOUS at 07:03

## 2022-10-27 RX ADMIN — ACETAMINOPHEN 1000 MG: 500 TABLET, FILM COATED ORAL at 17:51

## 2022-10-27 RX ADMIN — GABAPENTIN 300 MG: 300 CAPSULE ORAL at 22:10

## 2022-10-27 RX ADMIN — ATENOLOL 12.5 MG: 25 TABLET ORAL at 15:11

## 2022-10-27 NOTE — ANESTHESIA POSTPROCEDURE EVALUATION
Patient: Jaxon Aggarwal    Procedure Summary     Date: 10/27/22 Room / Location: [facility] OR 02 / [facility] MAIN OR    Anesthesia Start: 0717 Anesthesia Stop: 0814    Procedure: TRANSURETHRAL RESECTION OF PROSTATE (Urethra) Diagnosis:       BPH (benign prostatic hyperplasia)      (BPH (benign prostatic hyperplasia) [N40.0])    Surgeons: Donald Martinez MD Provider: Bradley Connor MD    Anesthesia Type: general ASA Status: 3          Anesthesia Type: general    Vitals  Vitals Value Taken Time   /48 10/27/22 0918   Temp 36.4 °C (97.6 °F) 10/27/22 0858   Pulse 70 10/27/22 0921   Resp 14 10/27/22 0858   SpO2 95 % 10/27/22 0921   Vitals shown include unvalidated device data.        Post Anesthesia Care and Evaluation    Patient location during evaluation: bedside  Patient participation: complete - patient participated  Level of consciousness: awake  Pain management: adequate    Airway patency: patent  Anesthetic complications: No anesthetic complications  PONV Status: none  Cardiovascular status: acceptable and stable  Respiratory status: acceptable  Hydration status: acceptable    Comments: An Anesthesiologist personally participated in the most demanding procedures (including induction and emergence if applicable) in the anesthesia plan, monitored the course of anesthesia administration at frequent intervals and remained physically present and available for immediate diagnosis and treatment of emergencies.

## 2022-10-27 NOTE — ANESTHESIA PREPROCEDURE EVALUATION
Anesthesia Evaluation     Patient summary reviewed and Nursing notes reviewed   history of anesthetic complications: PONV  NPO Solid Status: > 8 hours  NPO Liquid Status: > 2 hours           Airway   Mallampati: II  TM distance: >3 FB  Neck ROM: full  No difficulty expected  Dental - normal exam     Pulmonary - normal exam    breath sounds clear to auscultation  (+) shortness of breath,   Cardiovascular - normal exam  Exercise tolerance: poor (<4 METS)    ECG reviewed  Patient on routine beta blocker and Beta blocker not taken-may be given intraoperatively  Rhythm: regular  Rate: normal    (+) hypertension well controlled 2 medications or greater, MANZANARES, hyperlipidemia,       Neuro/Psych  (+) TIA, headaches, dizziness/light headedness, psychiatric history Anxiety,      ROS Comment:  shunt 2 years ago  GI/Hepatic/Renal/Endo    (+)  GERD well controlled,  diabetes mellitus type 2,     Musculoskeletal     Abdominal    Substance History - negative use     OB/GYN negative ob/gyn ROS         Other   arthritis,      ROS/Med Hx Other: <4METS, DECREASED MOBILITY, HX SOAE, HTN,  SHUNT, DM, TIA 8/16/22. F/U WITH NEURO, PULM, CARDS 8/22. CARDS OV Patient had cardiac work-up which showed that a 2D echo showed diastolic dysfunction and the stress test low risk. F/U 6-8MTHS. ZIO MONITOR -.     Had scopolamine in the past- was delusional after.                Anesthesia Plan    ASA 3     general     (Patient understands anesthesia not responsible for dental damage.)  intravenous induction     Anesthetic plan, risks, benefits, and alternatives have been provided, discussed and informed consent has been obtained with: patient.  Pre-procedure education provided  Use of blood products discussed with patient .   Plan discussed with CRNA.        CODE STATUS:

## 2022-10-28 VITALS
RESPIRATION RATE: 16 BRPM | HEIGHT: 67 IN | SYSTOLIC BLOOD PRESSURE: 118 MMHG | DIASTOLIC BLOOD PRESSURE: 42 MMHG | HEART RATE: 74 BPM | TEMPERATURE: 97.5 F | BODY MASS INDEX: 24.22 KG/M2 | WEIGHT: 154.32 LBS | OXYGEN SATURATION: 96 %

## 2022-10-28 LAB
ANION GAP SERPL CALCULATED.3IONS-SCNC: 6.8 MMOL/L (ref 5–15)
BUN SERPL-MCNC: 22 MG/DL (ref 8–23)
BUN/CREAT SERPL: 25.3 (ref 7–25)
CALCIUM SPEC-SCNC: 9.1 MG/DL (ref 8.6–10.5)
CHLORIDE SERPL-SCNC: 106 MMOL/L (ref 98–107)
CO2 SERPL-SCNC: 27.2 MMOL/L (ref 22–29)
CREAT SERPL-MCNC: 0.87 MG/DL (ref 0.76–1.27)
CYTO UR: NORMAL
DEPRECATED RDW RBC AUTO: 45.5 FL (ref 37–54)
EGFRCR SERPLBLD CKD-EPI 2021: 90 ML/MIN/1.73
ERYTHROCYTE [DISTWIDTH] IN BLOOD BY AUTOMATED COUNT: 12.8 % (ref 12.3–15.4)
GLUCOSE BLDC GLUCOMTR-MCNC: 135 MG/DL (ref 70–99)
GLUCOSE BLDC GLUCOMTR-MCNC: 99 MG/DL (ref 70–99)
GLUCOSE SERPL-MCNC: 121 MG/DL (ref 65–99)
HCT VFR BLD AUTO: 35.4 % (ref 37.5–51)
HGB BLD-MCNC: 11.7 G/DL (ref 13–17.7)
LAB AP CASE REPORT: NORMAL
LAB AP CLINICAL INFORMATION: NORMAL
MCH RBC QN AUTO: 32 PG (ref 26.6–33)
MCHC RBC AUTO-ENTMCNC: 33.1 G/DL (ref 31.5–35.7)
MCV RBC AUTO: 96.7 FL (ref 79–97)
PATH REPORT.FINAL DX SPEC: NORMAL
PATH REPORT.GROSS SPEC: NORMAL
PLATELET # BLD AUTO: 228 10*3/MM3 (ref 140–450)
PMV BLD AUTO: 10.6 FL (ref 6–12)
POTASSIUM SERPL-SCNC: 4.4 MMOL/L (ref 3.5–5.2)
RBC # BLD AUTO: 3.66 10*6/MM3 (ref 4.14–5.8)
SODIUM SERPL-SCNC: 140 MMOL/L (ref 136–145)
WBC NRBC COR # BLD: 11.03 10*3/MM3 (ref 3.4–10.8)

## 2022-10-28 PROCEDURE — 82962 GLUCOSE BLOOD TEST: CPT

## 2022-10-28 PROCEDURE — 94799 UNLISTED PULMONARY SVC/PX: CPT

## 2022-10-28 PROCEDURE — 63710000001 MEMANTINE 10 MG TABLET: Performed by: UROLOGY

## 2022-10-28 PROCEDURE — A9270 NON-COVERED ITEM OR SERVICE: HCPCS | Performed by: UROLOGY

## 2022-10-28 PROCEDURE — 80048 BASIC METABOLIC PNL TOTAL CA: CPT | Performed by: UROLOGY

## 2022-10-28 PROCEDURE — 63710000001 ATENOLOL 25 MG TABLET: Performed by: UROLOGY

## 2022-10-28 PROCEDURE — 63710000001 INSULIN REGULAR HUMAN PER 5 UNITS: Performed by: UROLOGY

## 2022-10-28 PROCEDURE — 85027 COMPLETE CBC AUTOMATED: CPT | Performed by: UROLOGY

## 2022-10-28 PROCEDURE — 63710000001 FLUOXETINE 20 MG CAPSULE: Performed by: UROLOGY

## 2022-10-28 PROCEDURE — 63710000001 ACETAMINOPHEN 500 MG TABLET: Performed by: UROLOGY

## 2022-10-28 RX ORDER — HYDROCODONE BITARTRATE AND ACETAMINOPHEN 5; 325 MG/1; MG/1
1 TABLET ORAL EVERY 6 HOURS PRN
Qty: 12 TABLET | Refills: 0 | Status: SHIPPED | OUTPATIENT
Start: 2022-10-28

## 2022-10-28 RX ADMIN — MEMANTINE 10 MG: 10 TABLET ORAL at 09:33

## 2022-10-28 RX ADMIN — ACETAMINOPHEN 1000 MG: 500 TABLET, FILM COATED ORAL at 09:33

## 2022-10-28 RX ADMIN — INSULIN HUMAN 4 UNITS: 100 INJECTION, SOLUTION PARENTERAL at 00:04

## 2022-10-28 RX ADMIN — FLUOXETINE 40 MG: 20 CAPSULE ORAL at 09:33

## 2022-10-28 RX ADMIN — ATENOLOL 12.5 MG: 25 TABLET ORAL at 09:33

## 2022-10-28 RX ADMIN — ACETAMINOPHEN 1000 MG: 500 TABLET, FILM COATED ORAL at 02:36

## 2022-10-28 RX ADMIN — ARFORMOTEROL TARTRATE 15 MCG: 15 SOLUTION RESPIRATORY (INHALATION) at 06:49

## 2022-10-28 RX ADMIN — BUDESONIDE 0.25 MG: 0.25 SUSPENSION RESPIRATORY (INHALATION) at 06:50

## 2022-11-02 ENCOUNTER — TELEPHONE (OUTPATIENT)
Dept: UROLOGY | Facility: CLINIC | Age: 77
End: 2022-11-02

## 2022-11-02 PROBLEM — N40.1 BENIGN PROSTATIC HYPERPLASIA WITH LOWER URINARY TRACT SYMPTOMS: Status: ACTIVE | Noted: 2022-11-02

## 2022-11-02 NOTE — TELEPHONE ENCOUNTER
Caller: ENID MALDONADO    Relationship to patient: SPOUSE    Best call back number: [phone]    Chief complaint: TRANSURETHRAL RESECTION OF PROSTATE 10/27/22 W/DR CASTILLO    Type of visit: POST-OP    Requested date: 11/7/22     If rescheduling, when is the original appointment: 11/4/22     Additional notes: WIFE STATES SHE HAS AN APPT IN [place] THAT CONFLICTS, THEY WOULD LIKE TO RESCHEDULE TO 11/7/22 SINCE THEY WILL BE IN [place] FOR AN APPT [facility]/DR GRIJALVA @ 1:30PM, AND THEY CAN COME IN BEFORE OR AFTER THAT APPT. ROUTING TO CLINICAL PER NON-CLINICAL.

## 2022-11-03 LAB — QT INTERVAL: 401 MS

## 2022-12-07 ENCOUNTER — TELEPHONE (OUTPATIENT)
Dept: UROLOGY | Facility: CLINIC | Age: 77
End: 2022-12-07

## 2022-12-08 NOTE — TELEPHONE ENCOUNTER
2ND ATTEMPT LVM FOR PT TO CALL OFFICE BACK TO R/S NO SHOWED POST-OP WITH DR. CASTILLO FROM 12/6/MS

## 2022-12-12 ENCOUNTER — TRANSCRIBE ORDERS (OUTPATIENT)
Dept: GENERAL RADIOLOGY | Facility: HOSPITAL | Age: 77
End: 2022-12-12

## 2022-12-12 DIAGNOSIS — M51.36 DDD (DEGENERATIVE DISC DISEASE), LUMBAR: Primary | ICD-10-CM

## 2022-12-12 NOTE — TELEPHONE ENCOUNTER
3RD ATTEMPT LVM FOR PT TO CALL OFFICE BACK TO R/S NO SHOWED POST-OP WITH DR. CASITLLO FROM 12/6/MS ANYTHING ELSE TO DO?

## 2022-12-19 NOTE — TELEPHONE ENCOUNTER
Thank you for letting us take care of you today. We hope all your questions were addressed. If a question was overlooked or something else comes to mind after you return home, please contact a member of your Care Team listed below. Your Care Team at [place] [address] is Team #5  Maru Headley MD (Faculty)  Rukhsana Caal MD (Resident)  Anjana Gongora MD (Resident)  Simeon Lan MD (Resident)  Danny Villa MD, (Resident)  Marie Leos., [facility]  Duke Jackman., DERIK Abrams.,  LPN  Armando Chavez., Myah Evans., [facility] office)  Yanely Burr, [address] (Clinical Practice Manager)  Andrés Kelley, [address] [place] [address] (Clinical Pharmacist)       Office phone number: [phone]    If you need to get in right away due to illness, please be advised we have \"Same Day\" appointments available Monday-Friday. Please call us at [phone] option #3 to schedule your \"Same Day\" appointment. Vivi called from PAT.  Patient scheduled for surgery 10/27/22, and does not know when to stop 81 mg aspirin, or whether he has to stop it.  Please call patient and let him know.

## 2022-12-28 NOTE — PROGRESS NOTES
Chief Complaint    Urologic complaint    Subjective          Jaxon Aggarwal presents to [facility] UROLOG[facility]  History of Present Illness    77-year-old gentleman      BPH   family history of prostate cancer      Voiding okay, good stream.  No gross hematuria no incontinence.    Off BPH medication    PVR     12/22    021  10/27/2022  TURP -2 cm prostate - negative  5/22     000   6/20    007    10/22 was placed on Plavix for a few weeks for possible TIA, at this time he has been taken off this.    No cardiopulmonary history.  Patient does not smoke.  ASA 81     Previous    has a  shunt    3/22 0.8,   GFR 90      Patient cannot do office cystoscopy, too anxious       No history of kidney stone.    Prostate cancer diagnosed around 61 in his brother      Men in his family do not live to be old, mom lived to 97.    PSA       - Dr. Castillo    4/21    0.32  4/19    1.0            Past History:  Medical History: has a past medical history of Anxiety, Arthritis, BPH (benign prostatic hyperplasia), Chronic allergic rhinitis, Diabetes mellitus ([facility]), Difficulty walking, GERD (gastroesophageal reflux disease), Headache Tension (06/22/2021), Hearing loss, Hyperlipidemia, Hypertension, Hypogonadism in male, Imbalance, Memory loss, Mood disorder ([facility]), Neuropathy in diabetes ([facility]), Otitis media, Prostate disorder, Shingles, SOBOE (shortness of breath on exertion), TIA (transient ischemic attack) (08/16/2022), Ventricular shunt in place, and Vertigo.   Surgical History: has a past surgical history that includes Eye surgery; Tonsillectomy; Parathyroidectomy; Hernia repair; CSF shunt; and Transurethral resection of prostate (N/A, 10/27/2022).   Family History: family history includes Cancer in his father; Dementia in his maternal aunt, maternal aunt, and mother; Diabetes in an other family member; Stroke in his mother.   Social History: reports that he quit smoking about 25 years ago. His smoking use included  cigarettes. He has a 15.00 pack-year smoking history. He has never used smokeless tobacco. He reports current alcohol use. He reports that he does not use drugs.  Allergies: Patient has no known allergies.       Current Outpatient Medications:   •  alendronate (FOSAMAX) 70 MG tablet, Take 1 tablet by mouth Every 7 (Seven) Days., Disp: , Rfl:   •  amitriptyline (ELAVIL) 50 MG tablet, Take 50 mg by mouth Every Night., Disp: , Rfl:   •  atenolol (TENORMIN) 25 MG tablet, Take 12.5 mg by mouth Daily., Disp: , Rfl:   •  atorvastatin (LIPITOR) 20 MG tablet, Take 20 mg by mouth Every Night., Disp: , Rfl:   •  Breo Ellipta 100-25 MCG/INH inhaler, Inhale 1 puff Daily., Disp: , Rfl:   •  butalbital-acetaminophen-caffeine (FIORICET, ESGIC) -40 MG per tablet, Every 4 (Four) Hours As Needed for Migraine., Disp: , Rfl:   •  cholecalciferol (VITAMIN D3) 25 MCG (1000 UT) tablet, Take 1,000 Units by mouth Daily., Disp: , Rfl:   •  Coenzyme Q10 200 MG capsule, Take 200 mg by mouth Daily., Disp: , Rfl:   •  Diclofenac Sodium (VOLTAREN) 1 % gel gel, Apply 4 g topically to the appropriate area as directed 4 (Four) Times a Day., Disp: 100 g, Rfl: 2  •  diphenoxylate-atropine (LOMOTIL) 2.5-0.025 MG per tablet, Take 1 tablet by mouth 3 (Three) Times a Day As Needed., Disp: , Rfl:   •  FLUoxetine (PROzac) 40 MG capsule, Take 40 mg by mouth Daily., Disp: , Rfl:   •  gabapentin (NEURONTIN) 300 MG capsule, Take 300 mg by mouth Every Night., Disp: , Rfl:   •  HYDROcodone-acetaminophen (NORCO) 5-325 MG per tablet, Take 1 tablet by mouth Every 6 (Six) Hours As Needed for Moderate Pain., Disp: 12 tablet, Rfl: 0  •  ketoconazole (NIZORAL) 2 % shampoo, , Disp: , Rfl:   •  lisinopril-hydrochlorothiazide (PRINZIDE,ZESTORETIC) 20-25 MG per tablet, Take 0.5 tablets by mouth Daily., Disp: , Rfl:   •  meclizine (ANTIVERT) 25 MG tablet, 1 tablet 3 (Three) Times a Day As Needed for Dizziness or Nausea., Disp: , Rfl:   •  memantine (NAMENDA) 10 MG  tablet, Take 10 mg by mouth Daily., Disp: , Rfl:   •  metFORMIN (GLUCOPHAGE) 500 MG tablet, Take 1,000 mg by mouth 2 (Two) Times a Day With Meals., Disp: , Rfl:   •  triamcinolone (KENALOG) 0.1 % cream, 1 application 2 (Two) Times a Day As Needed., Disp: , Rfl:           Bladder Scan interpretation 12/30/2022    Estimation of residual urine via BVI 3000 Verathon Bladder Scan  MA/nurse performing: Leticia Segura MA  Residual Urine: 021 ml  Indication: Benign prostatic hyperplasia with lower urinary tract symptoms, symptom details unspecified   Position: Supine  Examination: Incremental scanning of the suprapubic area using 2.0 MHz transducer using copious amounts of acoustic gel.   Findings: An anechoic area was demonstrated which represented the bladder, with measurement of residual urine as noted. I inspected this myself. In that the residual urine was stable or insignificant, refer to plan for treatment and plan necessary at this time.         Objective     Vital Signs:   There were no vitals taken for this visit.             Assessment and Plan    Diagnoses and all orders for this visit:    1. Benign prostatic hyperplasia with lower urinary tract symptoms, symptom details unspecified (Primary)      BPH      Off BPH meds      Doing well      Follow-up as needed

## 2022-12-30 ENCOUNTER — OFFICE VISIT (OUTPATIENT)
Dept: UROLOGY | Facility: CLINIC | Age: 77
End: 2022-12-30

## 2022-12-30 VITALS — BODY MASS INDEX: 24.17 KG/M2 | WEIGHT: 154 LBS | HEIGHT: 67 IN | RESPIRATION RATE: 12 BRPM

## 2022-12-30 DIAGNOSIS — N40.1 BENIGN PROSTATIC HYPERPLASIA WITH LOWER URINARY TRACT SYMPTOMS, SYMPTOM DETAILS UNSPECIFIED: Primary | ICD-10-CM

## 2022-12-30 LAB
BILIRUB BLD-MCNC: NEGATIVE MG/DL
CLARITY, POC: CLEAR
COLOR UR: YELLOW
EXPIRATION DATE: ABNORMAL
GLUCOSE UR STRIP-MCNC: NEGATIVE MG/DL
KETONES UR QL: NEGATIVE
LEUKOCYTE EST, POC: ABNORMAL
Lab: ABNORMAL
NITRITE UR-MCNC: NEGATIVE MG/ML
PH UR: 6 [PH] (ref 5–8)
PROT UR STRIP-MCNC: NEGATIVE MG/DL
RBC # UR STRIP: ABNORMAL /UL
SP GR UR: 1.02 (ref 1–1.03)
SPECIMEN VOL 24H UR: 21 L
UROBILINOGEN UR QL: ABNORMAL

## 2022-12-30 PROCEDURE — 99024 POSTOP FOLLOW-UP VISIT: CPT | Performed by: UROLOGY

## 2022-12-30 PROCEDURE — 51798 US URINE CAPACITY MEASURE: CPT | Performed by: UROLOGY

## 2022-12-30 PROCEDURE — 81003 URINALYSIS AUTO W/O SCOPE: CPT | Performed by: UROLOGY

## 2023-04-14 ENCOUNTER — TRANSCRIBE ORDERS (OUTPATIENT)
Dept: LAB | Facility: HOSPITAL | Age: 78
End: 2023-04-14
Payer: MEDICARE

## 2023-04-14 DIAGNOSIS — E21.3 HYPERPARATHYROIDISM: ICD-10-CM

## 2023-04-14 DIAGNOSIS — Q87.89 OSTEOPOROSIS AND OCULOCUTANEOUS HYPOPIGMENTATION SYNDROME: Primary | ICD-10-CM

## 2023-04-14 DIAGNOSIS — M81.8 OSTEOPOROSIS AND OCULOCUTANEOUS HYPOPIGMENTATION SYNDROME: Primary | ICD-10-CM

## 2023-04-14 DIAGNOSIS — I10 ESSENTIAL HYPERTENSION, MALIGNANT: ICD-10-CM

## 2023-04-14 DIAGNOSIS — L81.8 OSTEOPOROSIS AND OCULOCUTANEOUS HYPOPIGMENTATION SYNDROME: Primary | ICD-10-CM

## 2023-04-14 DIAGNOSIS — E11.9 TYPE 2 DIABETES MELLITUS WITHOUT COMPLICATION, UNSPECIFIED WHETHER LONG TERM INSULIN USE: ICD-10-CM

## 2023-04-18 ENCOUNTER — LAB (OUTPATIENT)
Dept: LAB | Facility: HOSPITAL | Age: 78
End: 2023-04-18
Payer: MEDICARE

## 2023-04-18 DIAGNOSIS — L81.8 OSTEOPOROSIS AND OCULOCUTANEOUS HYPOPIGMENTATION SYNDROME: ICD-10-CM

## 2023-04-18 DIAGNOSIS — Q87.89 OSTEOPOROSIS AND OCULOCUTANEOUS HYPOPIGMENTATION SYNDROME: ICD-10-CM

## 2023-04-18 DIAGNOSIS — M81.8 OSTEOPOROSIS AND OCULOCUTANEOUS HYPOPIGMENTATION SYNDROME: ICD-10-CM

## 2023-04-18 LAB
25(OH)D3 SERPL-MCNC: 47.9 NG/ML (ref 30–100)
ALBUMIN SERPL-MCNC: 4.6 G/DL (ref 3.5–5.2)
ALBUMIN/GLOB SERPL: 1.6 G/DL
ALP SERPL-CCNC: 71 U/L (ref 39–117)
ALT SERPL W P-5'-P-CCNC: 27 U/L (ref 1–41)
ANION GAP SERPL CALCULATED.3IONS-SCNC: 11.7 MMOL/L (ref 5–15)
AST SERPL-CCNC: 20 U/L (ref 1–40)
BILIRUB SERPL-MCNC: 0.3 MG/DL (ref 0–1.2)
BUN SERPL-MCNC: 14 MG/DL (ref 8–23)
BUN/CREAT SERPL: 14.9 (ref 7–25)
CALCIUM SPEC-SCNC: 9.9 MG/DL (ref 8.6–10.5)
CHLORIDE SERPL-SCNC: 99 MMOL/L (ref 98–107)
CO2 SERPL-SCNC: 23.3 MMOL/L (ref 22–29)
CREAT SERPL-MCNC: 0.94 MG/DL (ref 0.76–1.27)
EGFRCR SERPLBLD CKD-EPI 2021: 84 ML/MIN/1.73
GLOBULIN UR ELPH-MCNC: 2.9 GM/DL
GLUCOSE SERPL-MCNC: 163 MG/DL (ref 65–99)
HBA1C MFR BLD: 7.1 % (ref 4.8–5.6)
POTASSIUM SERPL-SCNC: 4.6 MMOL/L (ref 3.5–5.2)
PROT SERPL-MCNC: 7.5 G/DL (ref 6–8.5)
PTH-INTACT SERPL-MCNC: 38 PG/ML (ref 15–65)
SODIUM SERPL-SCNC: 134 MMOL/L (ref 136–145)

## 2023-04-18 PROCEDURE — 36415 COLL VENOUS BLD VENIPUNCTURE: CPT

## 2023-04-18 PROCEDURE — 83970 ASSAY OF PARATHORMONE: CPT

## 2023-04-18 PROCEDURE — 82306 VITAMIN D 25 HYDROXY: CPT

## 2023-04-18 PROCEDURE — 83036 HEMOGLOBIN GLYCOSYLATED A1C: CPT

## 2023-04-18 PROCEDURE — 80053 COMPREHEN METABOLIC PANEL: CPT

## 2023-06-05 ENCOUNTER — TRANSCRIBE ORDERS (OUTPATIENT)
Dept: PULMONOLOGY | Facility: HOSPITAL | Age: 78
End: 2023-06-05
Payer: MEDICARE

## 2023-06-05 DIAGNOSIS — G47.33 OBSTRUCTIVE SLEEP APNEA (ADULT) (PEDIATRIC): Primary | ICD-10-CM

## 2023-06-09 ENCOUNTER — TRANSCRIBE ORDERS (OUTPATIENT)
Dept: PHYSICAL THERAPY | Facility: CLINIC | Age: 78
End: 2023-06-09
Payer: MEDICARE

## 2023-06-09 ENCOUNTER — TREATMENT (OUTPATIENT)
Dept: PHYSICAL THERAPY | Facility: CLINIC | Age: 78
End: 2023-06-09
Payer: MEDICARE

## 2023-06-09 DIAGNOSIS — R26.89 BALANCE DISORDER: ICD-10-CM

## 2023-06-09 DIAGNOSIS — R26.2 DIFFICULTY WALKING: ICD-10-CM

## 2023-06-09 DIAGNOSIS — R27.0 ATAXIA: Primary | ICD-10-CM

## 2023-06-09 PROCEDURE — 97110 THERAPEUTIC EXERCISES: CPT | Performed by: PHYSICAL THERAPIST

## 2023-06-09 PROCEDURE — 97162 PT EVAL MOD COMPLEX 30 MIN: CPT | Performed by: PHYSICAL THERAPIST

## 2023-06-09 NOTE — PROGRESS NOTES
Physical Therapy Initial Evaluation and Plan of Care                    [facility] [address] [place] [address]    Patient: Jaxon Aggarwal   : 1945  Diagnosis/ICD-10 Code:  Ataxia [R27.0]  Referring practitioner: Max Castillo MD  Date of Initial Visit: 2023  Today's Date: 2023  Patient seen for 1 sessions           Subjective Questionnaire: LEFS:  = 38.75% Function      Subjective Evaluation    History of Present Illness  Mechanism of injury: The patient presents to physical therapy with complaints of decreased balance, decreased endurance, and difficulty walking. He has fallen at least 4 times in the past year. He stumbles frequently, but is normally able to catch himself. He arrived to the clinic in a wheelchair today. He uses the wheelchair when he is out of his home. Inside his home, he uses a single tipped cane for ambulation. He does have some pain in his back with ambulation, but is limited more by fatigue and decreased balance. He is a diabetic and is starting to show signs of neuropathy. He has tingling in his feet and hands.       Patient Occupation: Retired Pain  Current pain ratin    Patient Goals  Patient goal: The patient would like to have improved endurance with ambulation, improved lower extremity strength, and be more independent with mobility.         Objective          Neurological Testing     Additional Neurological Details  Sensation to light touch intact and equal bilaterally from L2-S1 dermatomal pattern.    Strength/Myotome Testing     Left Hip   Planes of Motion   Flexion: 4-  Abduction: 4-  Adduction: 4-    Right Hip   Planes of Motion   Flexion: 4-  Abduction: 4  Adduction: 4-    Left Knee   Flexion: 4-  Extension: 4    Right Knee   Flexion: 4  Extension: 4+    Left Ankle/Foot   Dorsiflexion: 4    Right Ankle/Foot   Dorsiflexion: 4+    Ambulation     Ambulation: Level Surfaces     Additional Level Surfaces Ambulation Details  The patient  ambulates with a single tipped cane in his right hand with minimal toe clearance bilaterally and wide base of support.    Functional Assessment     Comments  TUG: 15.9 seconds with a STC    30 second sit to stand: 8 reps with cane in right hand    2 minute walk test: 280 feet    Balance assessment:  Narrow: 30 seconds with mild trunk sway  Tandem (R forward): 3 seconds  Tandem (L forward): 25 seconds  SLS R: 2 seconds  SLS L: unable   Neutral stance with eyes closed: 30 seconds with moderate trunk sway    See Exercise, Manual, and Modality Logs for complete treatment.     Assessment & Plan     Assessment  Impairments: abnormal gait, abnormal muscle firing, abnormal or restricted ROM, activity intolerance, impaired balance, impaired physical strength, lacks appropriate home exercise program, safety issue and weight-bearing intolerance  Functional Limitations: walking, uncomfortable because of pain, moving in bed, standing and stooping  Assessment details: The patient presents to physical therapy with complaints of decreased balance, strength and endurance which have gradually progressed without a specific NARENDRA over the past year. He presents with associated lower extremity weakness, antalgic gait, and functional mobility deficits (LEFS). He is at an elevated risk for falls based on recent history of falls, TUG time, and 2 minute walk test distance. He would benefit from skilled physical therapy as described below to address these limitations and allow the patient to return to his prior level of function.      Prognosis: good    Goals  Plan Goals: 1. The patient demonstrates weakness of the bilateral hips.   LTG 1: 12 weeks:  The patient will demonstrate 4+/5 strength for bilateral hip flexion, abduction,  and adduction in order to improve hip stability.    STATUS:  New   STG 1a: 6 weeks:  The patient will demonstrate 4/5 strength for bilateral hip flexion, abduction,  and adduction.    STATUS:  New    2. The patient  has gait dysfunction.   LTG 2: 12 weeks:  The patient will ambulate without assistive device, independently, for community distances with normal biomechanics in order to improve mobility and allow patient to perform activities such as grocery shopping with greater ease.    STATUS:  New   STG 2a: The patient will be independent in HEP.    STATUS:  New    3. The patient is at an increased risk for falls based on TUG time.               LTG 3: 12 weeks:  The patient will demonstrate decreased risk for falls by completing the TUG test in 14 seconds or less.                          STATUS:  New    4. The patient has limited ability to safely perform community ambulation based on 2 minute walk test time.              LTG 4: 12 weeks:  The patient will demonstrate improved tolerance to community ambulation by walking 350 feet or greater on the 2 minute walk test.                          STATUS:  New              STG 4a: 6 weeks:  The patient will demonstrate improved tolerance to community ambulation by walking 300 feet or greater on the 2 minute walk test.                          STATUS:  New     5. Mobility: Walking/Moving Around Functional Limitation     LTG 5: 12 weeks:  The patient will demonstrate 25% limitation by achieving a score of 60/80 on the Lower Extremity Functional Scale.    STATUS:  New   STG 5a: 6 weeks:  The patient will demonstrate 50% limitation by achieving a score of 40/80 on the Lower Extremity Functional Scale.      STATUS:  New     Plan  Therapy options: will be seen for skilled therapy services  Planned modality interventions: cryotherapy, electrical stimulation/Russian stimulation and TENS  Planned therapy interventions: balance/weight-bearing training, ADL retraining, soft tissue mobilization, strengthening, stretching, therapeutic activities, joint mobilization, home exercise program, gait training, functional ROM exercises, flexibility, body mechanics training, postural training,  neuromuscular re-education and manual therapy  Frequency: 2x week  Duration in weeks: 12  Treatment plan discussed with: patient      Visit Diagnoses:    ICD-10-CM ICD-9-CM   1. Ataxia  R27.0 781.3   2. Balance disorder  R26.89 781.99   3. Difficulty walking  R26.2 719.7       History # of Personal Factors and/or Comorbidities: MODERATE (1-2)  Examination of Body System(s): # of elements: MODERATE (3)  Clinical Presentation: EVOLVING  Clinical Decision Making: MODERATE      Timed:         Manual Therapy:    0     mins  06093;     Therapeutic Exercise:    10     mins  21306;     Neuromuscular Krupa:    0    mins  82060;    Therapeutic Activity:     0     mins  09176;     Gait Trainin     mins  32586;     Ultrasound:     0     mins  91245;    Ionto                               0    mins   00115  Self Care                       0     mins   89418  Canalith Repos    0     mins 04658      Un-Timed:  Electrical Stimulation:    0     mins  30532 ( );  Dry Needling     0     mins self-pay  Traction     0     mins 70389  Low Eval     0     Mins  89342  Mod Eval     30     Mins  08524  High Eval                       0     Mins  49922  Re-Eval                           0    mins  99255    Timed Treatment:   10   mins   Total Treatment:     40   mins    PT SIGNATURE: Kane Barrera PT    Electronically signed 2023    [place] License: PT - [id number]      Initial Certification  Certification Period: 2023 thru 2023  I certify that the therapy services are furnished while this patient is under my care.  The services outlined above are required by this patient, and will be reviewed every 90 days.     PHYSICIAN: Max Castillo MD  NPI: [id number]      DATE:     Please sign and return via fax to [phone]. Thank you, [facility].

## 2023-06-12 ENCOUNTER — TREATMENT (OUTPATIENT)
Dept: PHYSICAL THERAPY | Facility: CLINIC | Age: 78
End: 2023-06-12
Payer: MEDICARE

## 2023-06-12 DIAGNOSIS — R27.0 ATAXIA: Primary | ICD-10-CM

## 2023-06-12 DIAGNOSIS — R26.2 DIFFICULTY WALKING: ICD-10-CM

## 2023-06-12 DIAGNOSIS — R26.89 BALANCE DISORDER: ICD-10-CM

## 2023-06-12 PROCEDURE — 97110 THERAPEUTIC EXERCISES: CPT | Performed by: PHYSICAL THERAPIST

## 2023-06-12 PROCEDURE — 97530 THERAPEUTIC ACTIVITIES: CPT | Performed by: PHYSICAL THERAPIST

## 2023-06-12 NOTE — PROGRESS NOTES
Outpatient Physical Therapy                   Physical Therapy Daily Treatment Note    Patient: Jaxon Aggarwal   : 1945  Diagnosis/ICD-10 Code:  Ataxia [R27.0]  Referring practitioner: Max Castillo MD  Date of Initial Visit: Type: THERAPY  Noted: 2023  Today's Date: 2023  Patient seen for 2 sessions             Subjective   Jaxon Aggarwal has no new complaints.     Pain: 0/10 pain, at time of arrival.     Objective     See Exercise, Manual, and Modality Logs for complete treatment.     Assessment/Plan  Jaxon is just beginning care to attend to deficits outlined in evaluation, requiring further therapist directed strengthening. Assess how patient tolerated treatment next session.    Progress per Plan of Care       Timed:  Manual Therapy:    0     mins  90376;  Therapeutic Exercise:    15     mins  42287;     Neuromuscular Krupa:    0    mins  17685;    Therapeutic Activity:     10     mins  55281;     Gait Trainin     mins  20384;    Aquatic Therapy:     0     mins  38506;       Untimed:  Electrical Stimulation:    0     mins  57262 ( );  Mechanical Traction:    0     mins  93397;       Timed Treatment:   25   mins   Total Treatment:     25   mins      Electronically signed:   Amparo Coates PTA  Physical Therapist Assistant  [facility] License #: [id number]

## 2023-06-13 ENCOUNTER — TRANSCRIBE ORDERS (OUTPATIENT)
Dept: SLEEP MEDICINE | Facility: HOSPITAL | Age: 78
End: 2023-06-13
Payer: MEDICARE

## 2023-06-13 DIAGNOSIS — G47.33 OSA (OBSTRUCTIVE SLEEP APNEA): Primary | ICD-10-CM

## 2023-07-06 ENCOUNTER — TELEPHONE (OUTPATIENT)
Dept: PHYSICAL THERAPY | Facility: CLINIC | Age: 78
End: 2023-07-06

## 2023-07-19 ENCOUNTER — TELEPHONE (OUTPATIENT)
Dept: PHYSICAL THERAPY | Facility: CLINIC | Age: 78
End: 2023-07-19

## 2023-08-11 ENCOUNTER — TRANSCRIBE ORDERS (OUTPATIENT)
Dept: GENERAL RADIOLOGY | Facility: HOSPITAL | Age: 78
End: 2023-08-11
Payer: MEDICARE

## 2023-08-11 DIAGNOSIS — M25.552 LEFT HIP PAIN: ICD-10-CM

## 2023-08-11 DIAGNOSIS — M25.551 RIGHT HIP PAIN: ICD-10-CM

## 2023-08-11 DIAGNOSIS — M19.91 PRIMARY LOCALIZED OSTEOARTHROSIS: Primary | ICD-10-CM

## 2023-08-11 DIAGNOSIS — M54.50 LOW BACK PAIN, UNSPECIFIED BACK PAIN LATERALITY, UNSPECIFIED CHRONICITY, UNSPECIFIED WHETHER SCIATICA PRESENT: ICD-10-CM

## 2023-08-14 ENCOUNTER — HOSPITAL ENCOUNTER (OUTPATIENT)
Dept: GENERAL RADIOLOGY | Facility: HOSPITAL | Age: 78
Discharge: HOME | End: 2023-08-14
Admitting: INTERNAL MEDICINE
Payer: MEDICARE

## 2023-08-14 DIAGNOSIS — M19.91 PRIMARY LOCALIZED OSTEOARTHROSIS: ICD-10-CM

## 2023-08-14 DIAGNOSIS — M25.552 LEFT HIP PAIN: ICD-10-CM

## 2023-08-14 DIAGNOSIS — M54.50 LOW BACK PAIN, UNSPECIFIED BACK PAIN LATERALITY, UNSPECIFIED CHRONICITY, UNSPECIFIED WHETHER SCIATICA PRESENT: ICD-10-CM

## 2023-08-14 DIAGNOSIS — M25.551 RIGHT HIP PAIN: ICD-10-CM

## 2023-08-14 PROCEDURE — 72110 X-RAY EXAM L-2 SPINE 4/>VWS: CPT

## 2023-08-14 PROCEDURE — 73521 X-RAY EXAM HIPS BI 2 VIEWS: CPT

## 2023-08-15 ENCOUNTER — OFFICE VISIT (OUTPATIENT)
Dept: ORTHOPEDIC SURGERY | Facility: CLINIC | Age: 78
End: 2023-08-15
Payer: MEDICARE

## 2023-08-15 VITALS
BODY MASS INDEX: 25.18 KG/M2 | OXYGEN SATURATION: 95 % | HEART RATE: 92 BPM | WEIGHT: 160.4 LBS | HEIGHT: 67 IN | SYSTOLIC BLOOD PRESSURE: 112 MMHG | DIASTOLIC BLOOD PRESSURE: 66 MMHG

## 2023-08-15 DIAGNOSIS — M25.551 BILATERAL HIP PAIN: Primary | ICD-10-CM

## 2023-08-15 DIAGNOSIS — M70.62 TROCHANTERIC BURSITIS OF BOTH HIPS: ICD-10-CM

## 2023-08-15 DIAGNOSIS — M25.552 BILATERAL HIP PAIN: Primary | ICD-10-CM

## 2023-08-15 DIAGNOSIS — M70.61 TROCHANTERIC BURSITIS OF BOTH HIPS: ICD-10-CM

## 2023-08-15 RX ORDER — DEXAMETHASONE SODIUM PHOSPHATE 4 MG/ML
8 INJECTION, SOLUTION INTRA-ARTICULAR; INTRALESIONAL; INTRAMUSCULAR; INTRAVENOUS; SOFT TISSUE ONCE
Status: COMPLETED | OUTPATIENT
Start: 2023-08-15 | End: 2023-08-15

## 2023-08-15 RX ORDER — CELECOXIB 200 MG/1
200 CAPSULE ORAL DAILY
COMMUNITY
Start: 2023-08-09

## 2023-08-15 RX ORDER — FAMOTIDINE 20 MG/1
20 TABLET, FILM COATED ORAL
COMMUNITY
Start: 2023-04-25

## 2023-08-15 RX ORDER — SUMATRIPTAN 50 MG/1
50 TABLET, FILM COATED ORAL ONCE
COMMUNITY
Start: 2023-08-09

## 2023-08-15 RX ADMIN — DEXAMETHASONE SODIUM PHOSPHATE 8 MG: 4 INJECTION, SOLUTION INTRA-ARTICULAR; INTRALESIONAL; INTRAMUSCULAR; INTRAVENOUS; SOFT TISSUE at 16:23

## 2023-08-15 NOTE — PROGRESS NOTES
"Chief Complaint  Initial Evaluation of the Right Hip and Initial Evaluation of the Left Hip     Subjective      Jaxon Aggarwal presents to [facility] for initial evaluation of bilateral hips. He has been having pain in his hips for years.  He has pain in bilateral hips.  The pain is about equal.  He has pain on the lateral aspect of his hip.  He had X ray performed and here to review. He has difficulty with prolonged standing, ambulation and stairs.     No Known Allergies     Social History     Socioeconomic History    Marital status:    Tobacco Use    Smoking status: Former     Packs/day: 1.00     Years: 15.00     Pack years: 15.00     Types: Cigarettes     Quit date: 1997     Years since quittin.2    Smokeless tobacco: Never    Tobacco comments:     smoked 21-30 years, quit smoking at age 51, smoked 10 cigaretts per day   Vaping Use    Vaping Use: Never used   Substance and Sexual Activity    Alcohol use: Yes     Comment: occ.    Drug use: Never    Sexual activity: Not Currently     Partners: Female        I reviewed the patient's chief complaint, history of present illness, review of systems, past medical history, surgical history, family history, social history, medications, and allergy list.     Review of Systems     Constitutional: Denies fevers, chills, weight loss  Cardiovascular: Denies chest pain, shortness of breath  Skin: Denies rashes, acute skin changes  Neurologic: Denies headache, loss of consciousness        Vital Signs:   /66 (BP Location: Left arm, Patient Position: Sitting, Cuff Size: Adult)   Pulse 92   Ht 170.2 cm (67\")   Wt 72.8 kg (160 lb 6.4 oz)   SpO2 95%   BMI 25.12 kg/mý          Physical Exam  General: Alert. No acute distress    Ortho Exam      BILATERAL HIPS . No skin discoloration, atrophy or swelling. Positive EHL, FHL, GS, and TA. Sensation intact to all 5 nerves of the foot. Positive straight leg raise. Leg lengths appear " equal. Ambulates with Antalgic gait Negative Norberto. Negative Andrez. Good strength to hamstrings, quadriceps, dorsiflexors, and plantar flexors. Knee Extensor Mechanism  intact        Procedures      Imaging Results (Most Recent)       None             Result Review :         XR Spine Lumbar Complete 4+VW    Result Date: 8/14/2023  Narrative: PROCEDURE: XR SPINE LUMBAR COMPLETE 4+VW  COMPARISON: [facility], CR, LS-SPINE - AP & LAT, 11/30/2017, 9:14.  INDICATIONS: LOW BACK PAIN. BILATERAL HIP PAIN WHEN STANDING FOR LONGER PERIODS OF TIME.  FINDINGS:  There is mild disc space narrowing and vacuum disc phenomena at L3-L4 and L4-L5 consistent with degenerative disc disease.  There is multilevel endplate sclerosis and spurring consistent with degenerative spondylosis.  There are facet arthritic changes.  There is no acute fracture or dislocation.  There are atherosclerotic vascular calcifications in the abdominal aorta extending into the iliac and visceral arteries.      Impression:   1. Degenerative changes. 2. No acute fracture or dislocation.     JOSELUIS COPE MD       Electronically Signed and Approved By: JOSELUIS COPE MD on 8/14/2023 at 15:36             XR Hips Bilateral With or Without Pelvis 2 View    Result Date: 8/14/2023  Narrative: PROCEDURE: XR HIPS BILATERAL W OR WO PELVIS 2 VIEW  COMPARISON: [facility] , , XR HIPS BILATERAL W OR WO PELVIS 2 VIEW, 8/05/2021, 15:04.  INDICATIONS: LOW BACK PAIN. BILATERAL HIP PAIN WHEN STANDING FOR LONGER PERIODS OF TIME.  FINDINGS:  There is no acute fracture or dislocation.  There is narrowing and spurring of both hip joints consistent with osteoarthritis.  The pubic symphysis and sacroiliac joints are intact.  There are degenerative changes in the lower lumbar spine.  There is partially imaged shunt tubing in place.  There are round pelvic calcifications consistent with phleboliths.      Impression:   1. No acute fracture or dislocation. 2. Degenerative  changes.      JOSELUIS COPE MD       Electronically Signed and Approved By: JOSELUIS COPE MD on 8/14/2023 at 15:33                     Assessment and Plan     Diagnoses and all orders for this visit:    1. Bilateral hip pain (Primary)    2. Trochanteric bursitis of both hips        Discussed the treatment plan with the patient. I reviewed the X-rays that were obtained 8/14/23 with the patient.     Discussed the risks and benefits of conservative measures.  The patient expressed understanding and wished to proceed with a IM injection. .    Continue anti inflammatory.  HEP exercises.     Call or return if worsening symptoms.    Follow Up     PRN      Patient was given instructions and counseling regarding his condition or for health maintenance advice. Please see specific information pulled into the AVS if appropriate.     Scribed for Cruzito Adan MD by Estephania Gorman MA.  08/15/23   15:56 EDT    I have personally performed the services described in this document as scribed by the above individual and it is both accurate and complete. Cruzito Adan MD 08/15/23

## 2023-09-07 ENCOUNTER — OFFICE VISIT (OUTPATIENT)
Dept: ORTHOPEDIC SURGERY | Facility: CLINIC | Age: 78
End: 2023-09-07
Payer: MEDICARE

## 2023-09-07 VITALS
DIASTOLIC BLOOD PRESSURE: 69 MMHG | BODY MASS INDEX: 25.9 KG/M2 | SYSTOLIC BLOOD PRESSURE: 130 MMHG | WEIGHT: 165 LBS | HEART RATE: 63 BPM | HEIGHT: 67 IN | OXYGEN SATURATION: 94 %

## 2023-09-07 DIAGNOSIS — M25.511 RIGHT SHOULDER PAIN, UNSPECIFIED CHRONICITY: Primary | ICD-10-CM

## 2023-09-07 DIAGNOSIS — M75.40 IMPINGEMENT SYNDROME OF SHOULDER REGION, UNSPECIFIED LATERALITY: ICD-10-CM

## 2023-09-07 RX ORDER — TRIAMCINOLONE ACETONIDE 40 MG/ML
40 INJECTION, SUSPENSION INTRA-ARTICULAR; INTRAMUSCULAR
Status: COMPLETED | OUTPATIENT
Start: 2023-09-07 | End: 2023-09-07

## 2023-09-07 RX ORDER — BUPIVACAINE HYDROCHLORIDE 5 MG/ML
5 INJECTION, SOLUTION EPIDURAL; INTRACAUDAL
Status: COMPLETED | OUTPATIENT
Start: 2023-09-07 | End: 2023-09-07

## 2023-09-07 RX ADMIN — TRIAMCINOLONE ACETONIDE 40 MG: 40 INJECTION, SUSPENSION INTRA-ARTICULAR; INTRAMUSCULAR at 09:44

## 2023-09-07 RX ADMIN — BUPIVACAINE HYDROCHLORIDE 5 ML: 5 INJECTION, SOLUTION EPIDURAL; INTRACAUDAL at 09:44

## 2023-09-07 NOTE — PROGRESS NOTES
"Chief Complaint  Initial Evaluation of the Right Shoulder     Subjective      Jaxon Aggarwal presents to [facility] for initial evaluation of the right shoulder.  He has had pain for awhile.  He has had increase pain the last couple of months.  There is no injury or fall.  He has pain in the shoulder and down the arm.  He notes a dull ache.      No Known Allergies     Social History     Socioeconomic History    Marital status:    Tobacco Use    Smoking status: Former     Packs/day: 1.00     Years: 15.00     Pack years: 15.00     Types: Cigarettes     Quit date: 1997     Years since quittin.2    Smokeless tobacco: Never    Tobacco comments:     smoked 21-30 years, quit smoking at age 51, smoked 10 cigaretts per day   Vaping Use    Vaping Use: Never used   Substance and Sexual Activity    Alcohol use: Yes     Comment: occ.    Drug use: Never    Sexual activity: Not Currently     Partners: Female        I reviewed the patient's chief complaint, history of present illness, review of systems, past medical history, surgical history, family history, social history, medications, and allergy list.     Review of Systems     Constitutional: Denies fevers, chills, weight loss  Cardiovascular: Denies chest pain, shortness of breath  Skin: Denies rashes, acute skin changes  Neurologic: Denies headache, loss of consciousness        Vital Signs:   /69 (BP Location: Left arm, Patient Position: Sitting, Cuff Size: Adult)   Pulse 63   Ht 170.2 cm (67\")   Wt 74.8 kg (165 lb)   SpO2 94%   BMI 25.84 kg/m²          Physical Exam  General: Alert. No acute distress    Ortho Exam        RIGHT SHOULDER Forward flexion 160. Abduction 90. External rotation 45. Internal rotation L5. Negative Cross body adduction. Supraspinatus strength 5/5. Infraspinatus Strength 5/5. Infrared subscap 5/5. Positive Young. Positive Neer. Negative Apprehension. Negative Lift off. (Negative Obriens. " Sensation intact to light touch, median, radial, ulnar nerve. Positive AIN, PIN, ulnar nerve motor. Positive pulses. Positive Impingement signs. Good strength in triceps, biceps, deltoid, wrist extensors and wrist flexors.         Large Joint Arthrocentesis: R subacromial bursa  Date/Time: 9/7/2023 9:44 AM  Consent given by: patient  Site marked: site marked  Timeout: Immediately prior to procedure a time out was called to verify the correct patient, procedure, equipment, support staff and site/side marked as required   Supporting Documentation  Indications: pain   Procedure Details  Location: shoulder - R subacromial bursa  Needle gauge: 21G.  Medications administered: 40 mg triamcinolone acetonide 40 MG/ML; 5 mL bupivacaine (PF) 0.5 %  Patient tolerance: patient tolerated the procedure well with no immediate complications        Imaging Results (Most Recent)       Procedure Component Value Units Date/Time    XR Scapula Right [[id number]] Resulted: 09/07/23 0816     Updated: 09/07/23 0817             Result Review :     X-Ray Report:  Right scapula X-Ray  Indication: Evaluation of the right scapula  AP/Lateral view(s)  Findings: Mild AC joint arthritis with bone spurring.   Prior studies available for comparison: no       XR Spine Lumbar Complete 4+VW    Result Date: 8/14/2023  Narrative: PROCEDURE: XR SPINE LUMBAR COMPLETE 4+VW  COMPARISON: [facility], JENNIFER LS-SPINE - AP & LAT, 11/30/2017, 9:14.  INDICATIONS: LOW BACK PAIN. BILATERAL HIP PAIN WHEN STANDING FOR LONGER PERIODS OF TIME.  FINDINGS:  There is mild disc space narrowing and vacuum disc phenomena at L3-L4 and L4-L5 consistent with degenerative disc disease.  There is multilevel endplate sclerosis and spurring consistent with degenerative spondylosis.  There are facet arthritic changes.  There is no acute fracture or dislocation.  There are atherosclerotic vascular calcifications in the abdominal aorta extending into the iliac and visceral  arteries.      Impression:   1. Degenerative changes. 2. No acute fracture or dislocation.     JOSELUIS COPE MD       Electronically Signed and Approved By: JOSELUIS COPE MD on 8/14/2023 at 15:36             XR Hips Bilateral With or Without Pelvis 2 View    Result Date: 8/14/2023  Narrative: PROCEDURE: XR HIPS BILATERAL W OR WO PELVIS 2 VIEW  COMPARISON: [facility] , , XR HIPS BILATERAL W OR WO PELVIS 2 VIEW, 8/05/2021, 15:04.  INDICATIONS: LOW BACK PAIN. BILATERAL HIP PAIN WHEN STANDING FOR LONGER PERIODS OF TIME.  FINDINGS:  There is no acute fracture or dislocation.  There is narrowing and spurring of both hip joints consistent with osteoarthritis.  The pubic symphysis and sacroiliac joints are intact.  There are degenerative changes in the lower lumbar spine.  There is partially imaged shunt tubing in place.  There are round pelvic calcifications consistent with phleboliths.      Impression:   1. No acute fracture or dislocation. 2. Degenerative changes.      JOSELUIS COPE MD       Electronically Signed and Approved By: JOSELUIS COPE MD on 8/14/2023 at 15:33                     Assessment and Plan     Diagnoses and all orders for this visit:    1. Right shoulder pain, unspecified chronicity (Primary)  -     XR Scapula Right    2. Impingement syndrome of shoulder region, unspecified laterality        Discussed the treatment plan with the patient. I reviewed the X-rays that were obtained today with the patient.     Discussed the risks and benefits of conservative measures.  The patient expressed understanding and wished to proceed with a right shoulder steroid injection.  He tolerated the injection well.         Call or return if worsening symptoms.    Follow Up     PRN      Patient was given instructions and counseling regarding his condition or for health maintenance advice. Please see specific information pulled into the AVS if appropriate.     Scribed for Cruzito Adan MD by Estephania Gorman MA.  09/07/23    08:27 EDT    I have personally performed the services described in this document as scribed by the above individual and it is both accurate and complete. Cruzito Adan MD 09/07/23

## 2023-09-17 ENCOUNTER — HOSPITAL ENCOUNTER (EMERGENCY)
Facility: HOSPITAL | Age: 78
Discharge: HOME | End: 2023-09-17
Attending: EMERGENCY MEDICINE | Admitting: EMERGENCY MEDICINE
Payer: MEDICARE

## 2023-09-17 VITALS
SYSTOLIC BLOOD PRESSURE: 140 MMHG | HEART RATE: 73 BPM | BODY MASS INDEX: 24.63 KG/M2 | DIASTOLIC BLOOD PRESSURE: 89 MMHG | HEIGHT: 67 IN | RESPIRATION RATE: 18 BRPM | WEIGHT: 156.9 LBS | OXYGEN SATURATION: 96 % | TEMPERATURE: 98.9 F

## 2023-09-17 DIAGNOSIS — L03.114 CELLULITIS OF LEFT UPPER EXTREMITY: ICD-10-CM
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
## 2023-09-17 LAB
ALBUMIN SERPL-MCNC: 3.9 G/DL (ref 3.5–5.2)
ALBUMIN/GLOB SERPL: 1.2 G/DL
ALP SERPL-CCNC: 62 U/L (ref 39–117)
ALT SERPL W P-5'-P-CCNC: 17 U/L (ref 1–41)
ANION GAP SERPL CALCULATED.3IONS-SCNC: 13.3 MMOL/L (ref 5–15)
AST SERPL-CCNC: 13 U/L (ref 1–40)
BASOPHILS # BLD AUTO: 0.03 10*3/MM3 (ref 0–0.2)
BASOPHILS NFR BLD AUTO: 0.3 % (ref 0–1.5)
BILIRUB SERPL-MCNC: 0.5 MG/DL (ref 0–1.2)
BUN SERPL-MCNC: 19 MG/DL (ref 8–23)
BUN/CREAT SERPL: 20.7 (ref 7–25)
CALCIUM SPEC-SCNC: 9.3 MG/DL (ref 8.6–10.5)
CHLORIDE SERPL-SCNC: 98 MMOL/L (ref 98–107)
CO2 SERPL-SCNC: 22.7 MMOL/L (ref 22–29)
CREAT SERPL-MCNC: 0.92 MG/DL (ref 0.76–1.27)
D-LACTATE SERPL-SCNC: 2.1 MMOL/L (ref 0.5–2)
DEPRECATED RDW RBC AUTO: 45.1 FL (ref 37–54)
EGFRCR SERPLBLD CKD-EPI 2021: 86.2 ML/MIN/1.73
EOSINOPHIL # BLD AUTO: 0.04 10*3/MM3 (ref 0–0.4)
EOSINOPHIL NFR BLD AUTO: 0.4 % (ref 0.3–6.2)
ERYTHROCYTE [DISTWIDTH] IN BLOOD BY AUTOMATED COUNT: 13 % (ref 12.3–15.4)
GLOBULIN UR ELPH-MCNC: 3.3 GM/DL
GLUCOSE SERPL-MCNC: 167 MG/DL (ref 65–99)
HCT VFR BLD AUTO: 40.8 % (ref 37.5–51)
HGB BLD-MCNC: 13.9 G/DL (ref 13–17.7)
IMM GRANULOCYTES # BLD AUTO: 0.1 10*3/MM3 (ref 0–0.05)
IMM GRANULOCYTES NFR BLD AUTO: 1 % (ref 0–0.5)
LYMPHOCYTES # BLD AUTO: 1.77 10*3/MM3 (ref 0.7–3.1)
LYMPHOCYTES NFR BLD AUTO: 17.6 % (ref 19.6–45.3)
MCH RBC QN AUTO: 32.3 PG (ref 26.6–33)
MCHC RBC AUTO-ENTMCNC: 34.1 G/DL (ref 31.5–35.7)
MCV RBC AUTO: 94.7 FL (ref 79–97)
MONOCYTES # BLD AUTO: 0.83 10*3/MM3 (ref 0.1–0.9)
MONOCYTES NFR BLD AUTO: 8.3 % (ref 5–12)
NEUTROPHILS NFR BLD AUTO: 7.28 10*3/MM3 (ref 1.7–7)
NEUTROPHILS NFR BLD AUTO: 72.4 % (ref 42.7–76)
NRBC BLD AUTO-RTO: 0 /100 WBC (ref 0–0.2)
PLATELET # BLD AUTO: 236 10*3/MM3 (ref 140–450)
PMV BLD AUTO: 11 FL (ref 6–12)
POTASSIUM SERPL-SCNC: 3.8 MMOL/L (ref 3.5–5.2)
PROT SERPL-MCNC: 7.2 G/DL (ref 6–8.5)
RBC # BLD AUTO: 4.31 10*6/MM3 (ref 4.14–5.8)
SODIUM SERPL-SCNC: 134 MMOL/L (ref 136–145)
WBC NRBC COR # BLD: 10.05 10*3/MM3 (ref 3.4–10.8)

## 2023-09-17 PROCEDURE — 85025 COMPLETE CBC W/AUTO DIFF WBC: CPT | Performed by: PHYSICIAN ASSISTANT

## 2023-09-17 PROCEDURE — 80053 COMPREHEN METABOLIC PANEL: CPT | Performed by: PHYSICIAN ASSISTANT

## 2023-09-17 PROCEDURE — 99283 EMERGENCY DEPT VISIT LOW MDM: CPT

## 2023-09-17 PROCEDURE — 36415 COLL VENOUS BLD VENIPUNCTURE: CPT

## 2023-09-17 PROCEDURE — 83605 ASSAY OF LACTIC ACID: CPT | Performed by: PHYSICIAN ASSISTANT

## 2023-09-17 RX ORDER — SODIUM CHLORIDE 0.9 % (FLUSH) 0.9 %
10 SYRINGE (ML) INJECTION AS NEEDED
Status: DISCONTINUED | OUTPATIENT
Start: 2023-09-17 | End: 2023-09-17 | Stop reason: HOSPADM

## 2023-09-17 RX ORDER — METRONIDAZOLE 500 MG/1
500 TABLET ORAL 2 TIMES DAILY
Qty: 14 TABLET | Refills: 0 | Status: SHIPPED | OUTPATIENT
Start: 2023-09-17 | End: 2023-09-24

## 2023-09-17 RX ORDER — SULFAMETHOXAZOLE AND TRIMETHOPRIM 800; 160 MG/1; MG/1
1 TABLET ORAL ONCE
Status: COMPLETED | OUTPATIENT
Start: 2023-09-17 | End: 2023-09-17

## 2023-09-17 RX ORDER — AMOXICILLIN AND CLAVULANATE POTASSIUM 875; 125 MG/1; MG/1
1 TABLET, FILM COATED ORAL EVERY 12 HOURS
Qty: 14 TABLET | Refills: 0 | Status: SHIPPED | OUTPATIENT
Start: 2023-09-17 | End: 2023-09-17 | Stop reason: ALTCHOICE

## 2023-09-17 RX ORDER — SULFAMETHOXAZOLE AND TRIMETHOPRIM 800; 160 MG/1; MG/1
1 TABLET ORAL 2 TIMES DAILY
Qty: 14 TABLET | Refills: 0 | Status: SHIPPED | OUTPATIENT
Start: 2023-09-17 | End: 2023-09-24

## 2023-09-17 RX ORDER — AMOXICILLIN AND CLAVULANATE POTASSIUM 875; 125 MG/1; MG/1
1 TABLET, FILM COATED ORAL ONCE
Status: COMPLETED | OUTPATIENT
Start: 2023-09-17 | End: 2023-09-17

## 2023-09-17 RX ADMIN — AMOXICILLIN AND CLAVULANATE POTASSIUM 1 TABLET: 875; 125 TABLET, FILM COATED ORAL at 14:57

## 2023-09-17 RX ADMIN — SULFAMETHOXAZOLE AND TRIMETHOPRIM 1 TABLET: 800; 160 TABLET ORAL at 16:17

## 2023-09-17 NOTE — ED PROVIDER NOTES
Time: 1:50 PM EDT  Date of encounter:  9/17/2023  Independent Historian/Clinical History and Information was obtained by:   Patient    History is limited by: N/A    Chief Complaint: right arm redness and swelling. Cat bite      History of Present Illness:  Patient is a 77 y.o. year old male who presents to the emergency department for evaluation of cat bite, right arm redness and swelling.  The patient reports that he was bit by a stray cat 9 days ago on his left forearm.  They cleaned awfully well and use Neosporin.  Started having pain and swelling to the left elbow and forearm and was seen by urgent care 2 days ago.  Forgot about the cat bite and did not mention it.  Had an x-ray which was unremarkable.  Started on Keflex.  Redness and swelling and pain are worsening.  Has been taking antibiotics.  Has been taking leftover pain medications at home.  Has had low-grade temp of 99 at home.  Denies any nausea, vomiting.  Has full range of motion of the arm.      HPI    Patient Care Team  Primary Care Provider: Max Castillo MD    Past Medical History:     No Known Allergies  Past Medical History:   Diagnosis Date    Anxiety     Arthritis     back    BPH (benign prostatic hyperplasia)     Chronic allergic rhinitis     Diabetes mellitus     average blood suger 130 to 150    Difficulty walking     GERD (gastroesophageal reflux disease)     Headache Tension 06/22/2021    Hearing loss     Hyperlipidemia     Hypertension     follows with Dr. Krishnan    Hypogonadism in male     Imbalance     Memory loss     Mood disorder     improved with shunt placement    Neuropathy in diabetes     feet and legs    Otitis media     Prostate disorder     Shingles     SOBOE (shortness of breath on exertion)     TIA (transient ischemic attack) 08/16/2022    Ventricular shunt in place     Ventricular Catheter and distal catheter kit with Bactiseal shunt system    Vertigo     improved with shunt placement     Past Surgical History:    Procedure Laterality Date    CSF SHUNT      Ventricular Catheter and distal catheter kit with Bactiseal shunt system    CYSTOSCOPY TRANSURETHRAL RESECTION OF PROSTATE N/A 10/27/2022    Procedure: TRANSURETHRAL RESECTION OF PROSTATE;  Surgeon: Donald Martinez MD;  Location: [facility] MAIN OR;  Service: Urology;  Laterality: N/A;    EYE SURGERY      Cataract surgery, lens replaced    HERNIA REPAIR      PARATHYROIDECTOMY      TONSILLECTOMY       Family History   Problem Relation Age of Onset    Stroke Mother     Dementia Mother     Cancer Father     Diabetes Other         Mellitus    Dementia Maternal Aunt     Dementia Maternal Aunt        Home Medications:  Prior to Admission medications    Medication Sig Start Date End Date Taking? Authorizing Provider   albuterol sulfate  (90 Base) MCG/ACT inhaler Inhale 2 puffs. 4/11/23   Jorje Phillips MD   alendronate (FOSAMAX) 70 MG tablet Take 1 tablet by mouth Every 7 (Seven) Days.    Jorje Phillips MD   amitriptyline (ELAVIL) 50 MG tablet Take 1 tablet by mouth Every Night. 6/24/22   Jorje Phillips MD   atenolol (TENORMIN) 25 MG tablet Take 0.5 tablets by mouth Daily. 9/28/22   Jorje Phillips MD   atorvastatin (LIPITOR) 20 MG tablet Take 1 tablet by mouth Every Night. 4/6/21   Jorje Phillips MD   Breo Ellipta 100-25 MCG/INH inhaler Inhale 1 puff Daily. 11/3/21   Jorje Phillips MD   butalbital-acetaminophen-caffeine (FIORICET, ESGIC) -40 MG per tablet Every 4 (Four) Hours As Needed for Migraine.  Patient not taking: Reported on 8/15/2023 11/2/21   Jorje Phillips MD   celecoxib (CeleBREX) 200 MG capsule Take 1 capsule by mouth Daily. 8/9/23   Jorje Phillips MD   cephalexin (KEFLEX) 500 MG capsule Take 1 capsule by mouth 3 (Three) Times a Day for 7 days. 9/15/23 9/22/23  Christen Bautista APRN   cholecalciferol (VITAMIN D3) 25 MCG (1000 UT) tablet Take 1,000 Units by mouth Daily.  Patient not taking: Reported on  8/15/2023    Jorje Phillips MD   Coenzyme Q10 200 MG capsule Take 200 mg by mouth Daily.    Jorje Phillips MD   Diclofenac Sodium (VOLTAREN) 1 % gel gel Apply 4 g topically to the appropriate area as directed 4 (Four) Times a Day.  Patient not taking: Reported on 8/15/2023 8/5/21   Cruzito Adan MD   diphenoxylate-atropine (LOMOTIL) 2.5-0.025 MG per tablet Take 1 tablet by mouth 3 (Three) Times a Day As Needed.  Patient not taking: Reported on 8/15/2023 7/6/21   Jorje Phillips MD   famotidine (PEPCID) 20 MG tablet Take 1 tablet by mouth. 23   Jorje Phillips MD   FLUoxetine (PROzac) 40 MG capsule Take 1 capsule by mouth Daily. 22   Jorje Phillips MD   gabapentin (NEURONTIN) 300 MG capsule Take 1 capsule by mouth Every Night. 21   Jorje Phillips MD   ketoconazole (NIZORAL) 2 % shampoo  22   Jorje Phillips MD   lisinopril-hydrochlorothiazide (PRINZIDE,ZESTORETIC) 20-25 MG per tablet Take 0.5 tablets by mouth Daily. 21   Jorje Phillips MD   meclizine (ANTIVERT) 25 MG tablet 1 tablet 3 (Three) Times a Day As Needed for Dizziness or Nausea. 22   Jorje Phillips MD   memantine (NAMENDA) 10 MG tablet Take 10 mg by mouth Daily.  Patient not taking: Reported on 8/15/2023 6/24/22   Jorje Phillips MD   metFORMIN (GLUCOPHAGE) 500 MG tablet Take 2 tablets by mouth 2 (Two) Times a Day With Meals. 21   Jorje Phillips MD   SUMAtriptan (IMITREX) 50 MG tablet Take 1 tablet by mouth 1 (One) Time. 23   Jorje Phillips MD   triamcinolone (KENALOG) 0.1 % cream 1 application  2 (Two) Times a Day As Needed.    Jorje Phillips MD        Social History:   Social History     Tobacco Use    Smoking status: Former     Packs/day: 1.00     Years: 15.00     Pack years: 15.00     Types: Cigarettes     Quit date: 1997     Years since quittin.3    Smokeless tobacco: Never    Tobacco comments:     smoked 21-30 years,  "quit smoking at age 51, smoked 10 cigaretts per day   Vaping Use    Vaping Use: Never used   Substance Use Topics    Alcohol use: Yes     Comment: occ.    Drug use: Never         Review of Systems:  Review of Systems   Constitutional:  Positive for fever (low grade). Negative for chills.   HENT:  Negative for congestion and sore throat.    Respiratory:  Negative for cough and shortness of breath.    Cardiovascular:  Negative for chest pain and palpitations.   Gastrointestinal:  Negative for abdominal pain, diarrhea, nausea and vomiting.   Genitourinary:  Negative for dysuria.   Musculoskeletal:  Positive for arthralgias.   Skin:  Positive for color change and wound.   Neurological:  Negative for numbness and headaches.   All other systems reviewed and are negative.     Physical Exam:  /89 (BP Location: Right arm, Patient Position: Sitting)   Pulse 73   Temp 98.9 °F (37.2 °C) (Oral)   Resp 18   Ht 170.2 cm (67\")   Wt 71.2 kg (156 lb 14.4 oz)   SpO2 96%   BMI 24.57 kg/m²     Physical Exam  Vitals and nursing note reviewed.   Constitutional:       Appearance: Normal appearance. He is not ill-appearing or toxic-appearing.   HENT:      Head: Normocephalic.      Nose: Nose normal.      Mouth/Throat:      Mouth: Mucous membranes are moist.   Eyes:      Conjunctiva/sclera: Conjunctivae normal.   Cardiovascular:      Rate and Rhythm: Normal rate and regular rhythm.   Pulmonary:      Effort: Pulmonary effort is normal.      Breath sounds: Normal breath sounds.   Musculoskeletal:         General: Normal range of motion.      Cervical back: Normal range of motion.   Skin:     General: Skin is warm and dry.      Capillary Refill: Capillary refill takes less than 2 seconds.      Findings: Erythema present.   Neurological:      Mental Status: He is alert.                Procedures:  Procedures      Medical Decision Making:      Comorbidities that affect care:    Diabetes, Hypertension    External Notes " reviewed:    Previous Clinic Note: Urgent care visit on 9/15/2023  had xr without fb, fracture      The following orders were placed and all results were independently analyzed by me:  Orders Placed This Encounter   Procedures    Comprehensive Metabolic Panel    Lactic Acid, Plasma    CBC Auto Differential    CBC & Differential       Medications Given in the Emergency Department:  Medications   amoxicillin-clavulanate (AUGMENTIN) 875-125 MG per tablet 1 tablet (1 tablet Oral Given 9/17/23 1457)   sulfamethoxazole-trimethoprim (BACTRIM DS,SEPTRA DS) 800-160 MG per tablet 1 tablet (1 tablet Oral Given 9/17/23 1617)        ED Course:         Labs:    Lab Results (last 24 hours)       ** No results found for the last 24 hours. **             Imaging:    No Radiology Exams Resulted Within Past 24 Hours      Differential Diagnosis and Discussion:    Cellulitis, abscess, tenosynovitis, septic joint    All labs were reviewed and interpreted by me.    MDM           Patient Care Considerations:          Consultants/Shared Management Plan:    I have discussed the case with Dr. Gaspar who states that the patient can be safely discharged with close follow up.    Social Determinants of Health:    Patient is independent, reliable, and has access to care.       Disposition and Care Coordination:    Discharged: I considered escalation of care by admitting this patient for observation, however the patient has improved and is suitable and  stable for discharge.    Patient with cellulitis over the left forearm.  Full range of motion of the elbow.  No clinical concern for septic joint.  Had imaging years ago without any foreign body noted.  We will switch antibiotics.  Advise follow-up in 48-72 hours for recheck but strict return precautions discussed.  I have explained the patient´s condition, diagnoses and treatment plan based on the information available to me at this time. I have answered questions and addressed any concerns. The  patient has a good  understanding of the patient´s diagnosis, condition, and treatment plan as can be expected at this point. The vital signs have been stable. The patient´s condition is stable and appropriate for discharge from the emergency department.      The patient will pursue further outpatient evaluation with the primary care physician or other designated or consulting physician as outlined in the discharge instructions. They are agreeable to this plan of care and follow-up instructions have been explained in detail. The patient has received these instructions in written format and have expressed an understanding of the discharge instructions. The patient is aware that any significant change in condition or worsening of symptoms should prompt an immediate return to this or the closest emergency department or call to [facility].  I have explained discharge medications and the need for follow up with the patient/caretakers. This was also printed in the discharge instructions. Patient was discharged with the following medications and follow up:      Medication List        New Prescriptions      metroNIDAZOLE 500 MG tablet  Commonly known as: FLAGYL  Take 1 tablet by mouth 2 (Two) Times a Day for 7 days.     sulfamethoxazole-trimethoprim 800-160 MG per tablet  Commonly known as: BACTRIM DS,SEPTRA DS  Take 1 tablet by mouth 2 (Two) Times a Day for 7 days.            Stop      cephalexin 500 MG capsule  Commonly known as: KEFLEX               Where to Get Your Medications        These medications were sent to [facility] - [place], [place] - [address] - [phone]  - [phone]   [address] [address]      Phone: [phone]   metroNIDAZOLE 500 MG tablet  sulfamethoxazole-trimethoprim 800-160 MG per tablet      Max Castillo MD  [address] [place] [address]  [phone]      in 48-72 hours, For wound re-check    [facility] EMERGENCY ROOM  [address] [place] [address]  Gill Modi  [facility] [address]  [phone]    in 48-72 hours if not improving, sooner if worsening       Final diagnoses:   Cat bite, initial encounter   Cellulitis of left upper extremity        ED Disposition       ED Disposition   Discharge    Condition   Stable    Comment   --               This medical record created using voice recognition software.             Salinas Smith PA-C  09/20/23 0970

## 2023-09-17 NOTE — DISCHARGE INSTRUCTIONS
I have started you on 2 different antibiotics to cover for cellulitis caused by the cat bite. You have declined the rabies vaccination course. Please return to the emergency department if you have rapid spreading of redness, high fever, feel unwell, or cannot move a joint without pain. Follow-up with your primary care provider or back in the ED if not starting to improve in 48-72 hours for recheck.

## 2024-03-06 ENCOUNTER — OFFICE VISIT (OUTPATIENT)
Dept: OTOLARYNGOLOGY | Facility: CLINIC | Age: 79
End: 2024-03-06
Payer: MEDICARE

## 2024-03-06 VITALS
HEART RATE: 80 BPM | TEMPERATURE: 97.9 F | HEIGHT: 67 IN | BODY MASS INDEX: 23.86 KG/M2 | DIASTOLIC BLOOD PRESSURE: 63 MMHG | SYSTOLIC BLOOD PRESSURE: 148 MMHG | OXYGEN SATURATION: 99 % | WEIGHT: 152 LBS | RESPIRATION RATE: 16 BRPM

## 2024-03-06 DIAGNOSIS — H90.3 SENSORINEURAL HEARING LOSS (SNHL) OF BOTH EARS: Primary | ICD-10-CM

## 2024-03-06 DIAGNOSIS — H61.23 BILATERAL IMPACTED CERUMEN: ICD-10-CM

## 2024-03-06 DIAGNOSIS — H69.93 DYSFUNCTION OF BOTH EUSTACHIAN TUBES: ICD-10-CM

## 2024-03-06 PROCEDURE — 1159F MED LIST DOCD IN RCRD: CPT | Performed by: OTOLARYNGOLOGY

## 2024-03-06 PROCEDURE — 99214 OFFICE O/P EST MOD 30 MIN: CPT | Performed by: OTOLARYNGOLOGY

## 2024-03-06 PROCEDURE — 1160F RVW MEDS BY RX/DR IN RCRD: CPT | Performed by: OTOLARYNGOLOGY

## 2024-03-06 PROCEDURE — 69210 REMOVE IMPACTED EAR WAX UNI: CPT | Performed by: OTOLARYNGOLOGY

## 2024-03-06 RX ORDER — PREDNISOLONE ACETATE 10 MG/ML
SUSPENSION/ DROPS OPHTHALMIC
COMMUNITY
Start: 2024-02-16

## 2024-03-06 RX ORDER — TAMSULOSIN HYDROCHLORIDE 0.4 MG/1
CAPSULE ORAL
COMMUNITY
Start: 2024-01-23

## 2024-03-06 RX ORDER — NIRMATRELVIR AND RITONAVIR 300-100 MG
KIT ORAL
COMMUNITY
Start: 2024-01-09

## 2024-03-06 RX ORDER — ONDANSETRON 4 MG/1
TABLET, ORALLY DISINTEGRATING ORAL
COMMUNITY
Start: 2024-02-07

## 2024-03-06 RX ORDER — PREDNISONE 5 MG/1
TABLET ORAL
COMMUNITY
Start: 2024-01-09

## 2024-03-06 NOTE — PROGRESS NOTES
Patient Name: Jaxon Aggarwal   Visit Date: 03/06/2024   Patient ID: [id number]  Provider: Andrew Peraza MD    Sex: male  Location[place] [facility]   YOB: 1945  Location Address: [address] [place] [address], [address],?[place]?[address]    Primary Care Provider Max Castillo MD  Location Phone: [phone]    Referring Provider: No ref. provider found        Chief Complaint  Follow-up and Hearing Loss (Audio at [facility] 02/28/2024 )    Subjective    History of Present Illness  Jaxon Aggarwal is a 78 y.o. male who presents to [facility] today as a consult from No ref. provider found.    He presents to the clinic today for evaluation of his ears and hearing.  I previously saw the patient in 2021 for unrelated issues related to possible sinusitis as a cause of his headaches. He has had a longstanding hearing loss and has used hearing aids for quite some time.  He recently had an audiogram performed which reveals a decline in hearing in his right ear; most of which seems to be sensorineural in nature.  I compared the results to his previous audiogram several years ago at which point he did have moderate to severe sensorineural hearing loss in both ears with normal symmetry.  He also had normal tympanometry at that time and word discrimination scores of 100%.  His word discrimination scores are now 60% on the right and 80% on the left.  Tympanometry revealed flat tympanogram on the right side.  He informs me that he had a lot of issues with recurrent ear infections as a child.    He just ordered a new set of hearing aids.    He does have a neurological history and sees a neurosurgeon and has had a  shunt placed.  They are not sure if he is due to have any further imaging, but his wife believes it to be likely as they have not seen the neurosurgeon in quite some time.  I reviewed some of his previous CT scans of the head which did not  reveal any abnormalities along the temporal bones.    Past Medical History:   Diagnosis Date    Anxiety     Arthritis     back    BPH (benign prostatic hyperplasia)     Chronic allergic rhinitis     Diabetes mellitus     average blood suger 130 to 150    Difficulty walking     GERD (gastroesophageal reflux disease)     Headache Tension 06/22/2021    Hearing loss     Hyperlipidemia     Hypertension     follows with Dr. Krishnan    Hypogonadism in male     Imbalance     Memory loss     Mood disorder     improved with shunt placement    Neuropathy in diabetes     feet and legs    Otitis media     Prostate disorder     Shingles     SOBOE (shortness of breath on exertion)     TIA (transient ischemic attack) 08/16/2022    Ventricular shunt in place     Ventricular Catheter and distal catheter kit with Bactiseal shunt system    Vertigo     improved with shunt placement       Past Surgical History:   Procedure Laterality Date    CSF SHUNT      Ventricular Catheter and distal catheter kit with Bactiseal shunt system    CYSTOSCOPY TRANSURETHRAL RESECTION OF PROSTATE N/A 10/27/2022    Procedure: TRANSURETHRAL RESECTION OF PROSTATE;  Surgeon: Donald Martinez MD;  Location: [facility] OR;  Service: Urology;  Laterality: N/A;    EYE SURGERY      Cataract surgery, lens replaced    HERNIA REPAIR      PARATHYROIDECTOMY      TONSILLECTOMY           Current Outpatient Medications:     albuterol sulfate  (90 Base) MCG/ACT inhaler, Inhale 2 puffs., Disp: , Rfl:     alendronate (FOSAMAX) 70 MG tablet, Take 1 tablet by mouth Every 7 (Seven) Days., Disp: , Rfl:     amitriptyline (ELAVIL) 50 MG tablet, Take 1 tablet by mouth Every Night., Disp: , Rfl:     atenolol (TENORMIN) 25 MG tablet, Take 0.5 tablets by mouth Daily., Disp: , Rfl:     atorvastatin (LIPITOR) 20 MG tablet, Take 1 tablet by mouth Every Night., Disp: , Rfl:     Breo Ellipta 100-25 MCG/INH inhaler, Inhale 1 puff Daily., Disp: , Rfl:      butalbital-acetaminophen-caffeine (FIORICET, ESGIC) -40 MG per tablet, Every 4 (Four) Hours As Needed for Migraine., Disp: , Rfl:     celecoxib (CeleBREX) 200 MG capsule, Take 1 capsule by mouth Daily., Disp: , Rfl:     cholecalciferol (VITAMIN D3) 25 MCG (1000 UT) tablet, Take 1 tablet by mouth Daily., Disp: , Rfl:     Coenzyme Q10 200 MG capsule, Take 200 mg by mouth Daily., Disp: , Rfl:     Diclofenac Sodium (VOLTAREN) 1 % gel gel, Apply 4 g topically to the appropriate area as directed 4 (Four) Times a Day., Disp: 100 g, Rfl: 2    diphenoxylate-atropine (LOMOTIL) 2.5-0.025 MG per tablet, Take 1 tablet by mouth 3 (Three) Times a Day As Needed., Disp: , Rfl:     famotidine (PEPCID) 20 MG tablet, Take 1 tablet by mouth., Disp: , Rfl:     FLUoxetine (PROzac) 40 MG capsule, Take 1 capsule by mouth Daily., Disp: , Rfl:     gabapentin (NEURONTIN) 300 MG capsule, Take 1 capsule by mouth Every Night., Disp: , Rfl:     ketoconazole (NIZORAL) 2 % shampoo, , Disp: , Rfl:     lisinopril-hydrochlorothiazide (PRINZIDE,ZESTORETIC) 20-25 MG per tablet, Take 0.5 tablets by mouth Daily., Disp: , Rfl:     meclizine (ANTIVERT) 25 MG tablet, 1 tablet 3 (Three) Times a Day As Needed for Dizziness or Nausea., Disp: , Rfl:     memantine (NAMENDA) 10 MG tablet, Take 1 tablet by mouth Daily., Disp: , Rfl:     metFORMIN (GLUCOPHAGE) 500 MG tablet, Take 2 tablets by mouth 2 (Two) Times a Day With Meals., Disp: , Rfl:     ondansetron ODT (ZOFRAN-ODT) 4 MG disintegrating tablet, , Disp: , Rfl:     Paxlovid, 300/100, 20 x 150 MG & 10 x 100MG tablet therapy pack tablet, , Disp: , Rfl:     prednisoLONE acetate (PRED FORTE) 1 % ophthalmic suspension, , Disp: , Rfl:     SUMAtriptan (IMITREX) 50 MG tablet, Take 1 tablet by mouth 1 (One) Time., Disp: , Rfl:     tamsulosin (FLOMAX) 0.4 MG capsule 24 hr capsule, , Disp: , Rfl:     triamcinolone (KENALOG) 0.1 % cream, 1 Application 2 (Two) Times a Day As Needed., Disp: , Rfl:     predniSONE  "(DELTASONE) 5 MG tablet, , Disp: , Rfl:      No Known Allergies    Family History   Problem Relation Age of Onset    Stroke Mother     Dementia Mother     Cancer Father     Diabetes Other         Mellitus    Dementia Maternal Aunt     Dementia Maternal Aunt         Social History     Social History Narrative    Not on file       Objective     Vital Signs:   /63 (BP Location: Left arm, Patient Position: Sitting, Cuff Size: Adult)   Pulse 80   Temp 97.9 °F (36.6 °C) (Temporal)   Resp 16   Ht 170.2 cm (67\")   Wt 68.9 kg (152 lb)   SpO2 99%   BMI 23.81 kg/m²       Physical Exam    Constitutional   Appearance  : well developed, well-nourished, alert and in no acute distress, voice clear and strong    Head  Inspection  : no deformities or lesions  Face  Inspection  : No facial lesions; House-Brackmann I/VI bilaterally  Palpation  : No TMJ crepitus nor  muscle tenderness bilaterally    Eyes  Vision  Visual Fields  : Extraocular movements are intact. No spontaneous or gaze-induced nystagmus.  Conjunctivae  : clear  Sclerae  : clear  Pupils and Irises  : pupils equal, round, and reactive to light.     Ears, Nose, Mouth and Throat    Ears    External Ears  : appearance within normal limits, no lesions present  Otoscopic Examination  : Impactions on both sides, cleared with curettes and the microscope.  Tympanic membrane appearance with myringosclerosis on both sides, otherwise within normal limits bilaterally without perforations, well-aerated middle ears  Hearing  : intact to conversational voice both ears  Tunning fork testing:     :    Nose    External Nose  : appearance normal  Intranasal Exam  : mucosa within normal limits, vestibules normal, no intranasal lesions present, septum midline, sinuses non tender to percussion  Oral Cavity    Oral Mucosa  : oral mucosa normal without pallor or cyanosis  Lips  : lip appearance normal  Teeth  : normal dentition for age  Gums  : gums pink, non-swollen, no " bleeding present  Tongue  : tongue appearance normal; normal mobility  Palate  : hard palate normal, soft palate appearance normal with symmetric mobility    Throat    Oropharynx  : no inflammation or lesions present, tonsils within normal limits  Hypopharynx  : appearance within normal limits, superior epiglottis within normal limits  Larynx  : appearance within normal limits, vocal cords within normal limits, no lesions present    Neck  Inspection/Palpation  : normal appearance, no masses or tenderness, trachea midline; thyroid size normal, nontender, no nodules or masses present on palpation    Respiratory  Respiratory Effort  : breathing unlabored  Inspection of Chest  : normal appearance, no retractions    Cardiovascular  Heart  : regular rate and rhythm    Lymphatic  Neck  : no lymphadenopathy present  Supraclavicular Nodes  : no lymphadenopathy present  Preauricular Nodes  : no lymphadenopathy present    Skin and Subcutaneous Tissue  General Inspection  : Regarding face and neck - there are no rashes present, no lesions present, and no areas of discoloration    Neurologic  Cranial Nerves  : cranial nerves II-XII are grossly intact bilaterally  Gait and Station  : normal gait, able to stand without diffculty    Psychiatric  Judgement and Insight  : judgment and insight intact  Mood and Affect  : mood normal, affect appropriate     Ear Cerumen Removal    Date/Time: 3/6/2024 1:48 PM    Performed by: Andrew Peraza MD  Authorized by: Andrew Peraza MD    Anesthesia:  Local Anesthetic: none  Location details: left ear and right ear  Procedure type: instrumentation, curette   Sedation:  Patient sedated: no                Assessment and Plan    Diagnoses and all orders for this visit:    1. Sensorineural hearing loss (SNHL) of both ears (Primary)  -     Comprehensive Hearing Test; Future  -     Tympanometry; Future    2. Dysfunction of both eustachian tubes  -     Comprehensive Hearing Test; Future  -      Tympanometry; Future    3. Bilateral impacted cerumen    Other orders  -     Ear Cerumen Removal    Examination today revealed cerumen impactions which I was able to clear.  He does have significant myringosclerosis on both sides, and monomeric tympanic membranes from either perforation or her previous PE tube placement.  I did not see any evidence of fluid or infection.  I discussed audiogram results and we did discuss an outside chance of acoustic neuroma, although his word discrimination score would be expected to be lower on the right side.  He is due to see his neurosurgeon soon, and I have held off on any imaging studies as he may have a scan planned with the neurosurgeon.  I have asked his wife to bring in the results for review if he does.  I have ordered an audiogram to be done in about 1 year, but he may have testing sooner as he just ordered new hearing aids from his audiologist.  I will plan on seeing him back in the clinic if there is any change or decline in hearing in his right ear.    Follow Up   No follow-ups on file.  Patient was given instructions and counseling regarding his condition or for health maintenance advice. Please see specific information pulled into the AVS if appropriate.

## 2024-09-04 ENCOUNTER — TELEPHONE (OUTPATIENT)
Dept: OTOLARYNGOLOGY | Facility: CLINIC | Age: 79
End: 2024-09-04
Payer: MEDICARE

## 2024-09-04 NOTE — TELEPHONE ENCOUNTER
Patients wife stated she would talk to Mr Aggarwal and [facility] would call us back. Appointment would be just next available with either provider only use 2:30 pm or 2:45 pm spot if with Dr Wylie thanks

## 2024-09-05 ENCOUNTER — DOCUMENTATION (OUTPATIENT)
Dept: PHYSICAL THERAPY | Facility: CLINIC | Age: 79
End: 2024-09-05
Payer: MEDICARE

## 2024-09-05 NOTE — PROGRESS NOTES
Outpatient Physical Therapy  [address], [place] [address]      Discharge Summary  Discharge Summary from Physical Therapy Report      Dates  PT visit: 6/9/23-7/17/23  Number of Visits: 9       Goals  Plan Goals: 1. The patient demonstrates weakness of the bilateral hips.              LTG 1: 12 weeks:  The patient will demonstrate 4+/5 strength for bilateral hip flexion, abduction,  and adduction in order to improve hip stability.                          STATUS:  Progressing              STG 1a: 6 weeks:  The patient will demonstrate 4/5 strength for bilateral hip flexion, abduction,  and adduction.                          STATUS:  Progressing     2. The patient has gait dysfunction.              LTG 2: 12 weeks:  The patient will ambulate without assistive device, independently, for community distances with normal biomechanics in order to improve mobility and allow patient to perform activities such as grocery shopping with greater ease.                          STATUS:  Progressing              STG 2a: The patient will be independent in HEP.                          STATUS:  Progressing     3. The patient is at an increased risk for falls based on TUG time.               LTG 3: 12 weeks:  The patient will demonstrate decreased risk for falls by completing the TUG test in 14 seconds or less.                          STATUS:  Progressing     4. The patient has limited ability to safely perform community ambulation based on 2 minute walk test time.              LTG 4: 12 weeks:  The patient will demonstrate improved tolerance to community ambulation by walking 350 feet or greater on the 2 minute walk test.                          STATUS:  Progressing              STG 4a: 6 weeks:  The patient will demonstrate improved tolerance to community ambulation by walking 300 feet or greater on the 2 minute walk test.                          STATUS:  Progressing     5. Mobility: Walking/Moving Around  Functional Limitation                               LTG 5: 12 weeks:  The patient will demonstrate 25% limitation by achieving a score of 60/80 on the Lower Extremity Functional Scale.                          STATUS:  Progressing              STG 5a: 6 weeks:  The patient will demonstrate 50% limitation by achieving a score of 40/80 on the Lower Extremity Functional Scale.                            STATUS:  Progressing     Discharge Plan: Continue with current home exercise program as instructed    Date of Discharge 9/5/2024      Electronically signed:   Marcy Sandoval PTA  Physical Therapist Assistant  [facility] License #: [id number]

## 2025-07-22 ENCOUNTER — OFFICE VISIT (OUTPATIENT)
Dept: ORTHOPEDIC SURGERY | Facility: CLINIC | Age: 80
End: 2025-07-22
Payer: MEDICARE

## 2025-07-22 VITALS
OXYGEN SATURATION: 93 % | BODY MASS INDEX: 26.09 KG/M2 | WEIGHT: 166.2 LBS | HEART RATE: 102 BPM | DIASTOLIC BLOOD PRESSURE: 67 MMHG | SYSTOLIC BLOOD PRESSURE: 147 MMHG | HEIGHT: 67 IN

## 2025-07-22 DIAGNOSIS — M25.552 LEFT HIP PAIN: Primary | ICD-10-CM

## 2025-07-22 DIAGNOSIS — M70.61 TROCHANTERIC BURSITIS OF BOTH HIPS: ICD-10-CM

## 2025-07-22 DIAGNOSIS — M70.62 TROCHANTERIC BURSITIS OF BOTH HIPS: ICD-10-CM

## 2025-07-22 PROCEDURE — 96372 THER/PROPH/DIAG INJ SC/IM: CPT

## 2025-07-22 PROCEDURE — 99213 OFFICE O/P EST LOW 20 MIN: CPT

## 2025-07-22 PROCEDURE — 1160F RVW MEDS BY RX/DR IN RCRD: CPT

## 2025-07-22 PROCEDURE — 1159F MED LIST DOCD IN RCRD: CPT

## 2025-07-22 RX ORDER — DEXAMETHASONE SODIUM PHOSPHATE 4 MG/ML
8 INJECTION, SOLUTION INTRA-ARTICULAR; INTRALESIONAL; INTRAMUSCULAR; INTRAVENOUS; SOFT TISSUE ONCE
Status: COMPLETED | OUTPATIENT
Start: 2025-07-22 | End: 2025-07-22

## 2025-07-22 RX ADMIN — DEXAMETHASONE SODIUM PHOSPHATE 8 MG: 4 INJECTION, SOLUTION INTRA-ARTICULAR; INTRALESIONAL; INTRAMUSCULAR; INTRAVENOUS; SOFT TISSUE at 13:46

## 2025-07-22 NOTE — PROGRESS NOTES
"Chief Complaint  Follow-up of the Left Hip    Subjective      Jaxon Aggarwal presents to [facility]     History of Present Illness  The patient is here today following up on his bilateral hips, specifically his left hip.    He was last seen in the office by Dr. Adan 2 years ago for bilateral hip pain. X-rays of the left hip showed mild left hip osteoarthritis. He experiences severe pain in his left hip when standing and twisting or turning it, locates it to be mainly on the lateral aspect of the hip. The pain is localized to the lateral/posterior, with no discomfort reported in the back or groin area. He has not had any recent falls but mentions a history of vertigo. He finds it challenging to walk on uneven surfaces such as his yard. He is considering a hip replacement due to the severity of the pain. He also reports knee pain, which he believes may be contributing to his hip discomfort. He is diabetic but does not regularly monitor his blood sugar levels and does not require insulin. He received an injection from Dr. Adan during his last visit and is requesting repeat today.      No Known Allergies    Objective     Vital Signs:   Vitals:    07/22/25 1303   BP: 147/67   Pulse: 102   SpO2: 93%   Weight: 75.4 kg (166 lb 3.2 oz)   Height: 170.2 cm (67.01\")     Body mass index is 26.02 kg/m².    I reviewed the patient's chief complaint, history of present illness, review of systems, past medical history, surgical history, family history, social history, medications, and allergy list.     Ortho Exam    General: Alert. No acute distress.  Gait: Nonantalgic gait.    Left Hip:No pain with passive hip ROM.  Intact active hip ROM with no reported groin pain.  Mild tenderness down the IT band.  Mild tenderness to the trochanteric bursa.  Non tender over the thigh or knee distally. Demonstrates intact active ankle dorsiflexion and plantarflexion. Demonstrates intact sensation over dorsal and " plantar foot.  Calf soft, nontender. Neurovascular intact distally. Palpable pedal pulses.     Physical Exam  Musculoskeletal:  Left Hip: Tenderness on palpation, no pain radiating to the buttock or groin            Imaging Results (Most Recent)       Procedure Component Value Units Date/Time    XR Hip With or Without Pelvis 2 - 3 View Left [[id number]] Resulted: 07/22/25 1343     Updated: 07/22/25 1343    Narrative:      X-Ray Report:  Study: X-rays ordered, taken in the office, and reviewed today  Site: Left hip xray  Indication: Left hip pain  View: AP/Lateral view(s)  Findings: Mild to moderate left hip osteoarthritis. No acute fractures.  Prior studies available for comparison: yes              Results  Imaging   - X-ray of the left hip: Mild left hip osteoarthritis         Assessment and Plan   Diagnoses and all orders for this visit:    1. Left hip pain (Primary)  -     XR Hip With or Without Pelvis 2 - 3 View Left  -     dexAMETHasone (DECADRON) injection 8 mg    2. Trochanteric bursitis of both hips         Assessment & Plan  1. Left hip bursitis:  The x-ray reveals mild arthritis, but this is not the source of the discomfort. The pain is attributed to bursitis/IT band syndrome, likely exacerbated by the knee condition and subsequent alteration in gait.    An IM steroid injection will be administered today. Stretches will be provided, and it is advised to commence these exercises from tomorrow. If there is no significant improvement in pain after a few weeks of stretching exercises, physical therapy will be considered to assess and correct gait issues. The exercises will be printed at checkout, and no follow-up appointment is necessary unless there are issues, in which case he should call to report his progress.    PROCEDURE    An IM Steroid injection was administered today.      Tobacco Use: Medium Risk (7/22/2025)    Patient History     Smoking Tobacco Use: Former     Smokeless Tobacco Use: Never      Passive Exposure: Past     Patient reports they have a history of tobacco use; encouraged continued tobacco cessation for further health benefits.                Follow Up   No follow-ups on file.  There are no Patient Instructions on file for this visit.    Patient was given instructions and counseling regarding his condition or for health maintenance advice. Please see specific information pulled into the AVS if appropriate.     Patient or patient representative verbalized consent for the use of Ambient Listening during the visit with  ALPHONSE Hamilton for chart documentation. 7/22/2025  15:46 EDT    Dictated Utilizing Dragon Dictation. Please note that portions of this note were completed with a voice recognition program. Part of this note may be an electronic transcription/translation of spoken language to printed text using the Dragon Dictation System.

## (undated) DEVICE — SLV SCD KN/LEN ADJ EXPRSS BLENDED MD 1P/U

## (undated) DEVICE — SOL IRR NACL 0.9PCT 3000ML

## (undated) DEVICE — METER,URINE,400ML,DRAIN BAG,L/F,LL,SLIDE: Brand: MEDLINE

## (undated) DEVICE — SKIN PREP TRAY W/CHG: Brand: MEDLINE INDUSTRIES, INC.

## (undated) DEVICE — MAT FLR ABS W/BLU/LINER 56X72IN WHT

## (undated) DEVICE — GLV SURG SENSICARE SLT PF LF 8 STRL

## (undated) DEVICE — CATH FOLEY LUBRICATH 3WY 22F 30CC

## (undated) DEVICE — Y-TYPE TUR/BLADDER IRRIGATION SET, REGULATING CLAMP

## (undated) DEVICE — TRAY,IRRIGATION,PISTON SYRINGE,60ML: Brand: MEDLINE

## (undated) DEVICE — Device

## (undated) DEVICE — SYR CATH/TIP 50ML 2OZ STRL 1P/U

## (undated) DEVICE — HF-RESECTION ELECTRODE PLASMALOOP LOOP, LARGE, 24 FR., 12°-30°, PK TURIS: Brand: OLYMPUS

## (undated) DEVICE — CYSTO PACK: Brand: MEDLINE INDUSTRIES, INC.

## (undated) DEVICE — TRAY,ADD A CATH,FOLEY,DRAIN BG,10ML SYR: Brand: MEDLINE

## (undated) DEVICE — EVAC BLDR ELLIK 1P/U